# Patient Record
Sex: MALE | Race: WHITE | Employment: OTHER | URBAN - METROPOLITAN AREA
[De-identification: names, ages, dates, MRNs, and addresses within clinical notes are randomized per-mention and may not be internally consistent; named-entity substitution may affect disease eponyms.]

---

## 2017-01-03 ENCOUNTER — TELEPHONE (OUTPATIENT)
Dept: ENDOCRINOLOGY CLINIC | Facility: CLINIC | Age: 67
End: 2017-01-03

## 2017-01-03 NOTE — TELEPHONE ENCOUNTER
Pt states that his sugar was 62 on Sunday and 55 yesterday. Sugar was 129 this morning. Pt was concerned about low readings. Please advise. Aware Dr. Armen Simpson on maternity leave. Please advise.

## 2017-01-03 NOTE — TELEPHONE ENCOUNTER
Dr. Rudy Waite reviewed the message below and patient's faxed sugar readings and gave the following orders:    Reduce glimeperide to 2mg in the morning. Called patient. Informed him of Dr. Olivas Staff instructions. He verbalized his understanding.  He states destin

## 2017-01-03 NOTE — TELEPHONE ENCOUNTER
Spoke with Sohail Amato. He is concerned about recent low sugars he has been having mostly around midday. He has faxed sugars. He is currently taking 1000mg MTF BID, 15 mg actos BID, and 3mg glimeperide in the morning. Last three days sugars recorded below.  Fax

## 2017-01-09 ENCOUNTER — PATIENT MESSAGE (OUTPATIENT)
Dept: OTOLARYNGOLOGY | Facility: CLINIC | Age: 67
End: 2017-01-09

## 2017-01-09 NOTE — TELEPHONE ENCOUNTER
From: Josue Wang  To: Shayy Garber MD  Sent: 1/9/2017 9:42 AM CST  Subject: Other    Good morning Dr. Agustin Rhoades. This is Rob [facility] Hunt Syndrome).  I want to find a primary care physician in the [place] facility and transfer my overall med

## 2017-01-14 ENCOUNTER — APPOINTMENT (OUTPATIENT)
Dept: LAB | Facility: HOSPITAL | Age: 67
End: 2017-01-14
Attending: INTERNAL MEDICINE
Payer: MEDICARE

## 2017-01-14 ENCOUNTER — OFFICE VISIT (OUTPATIENT)
Dept: INTERNAL MEDICINE CLINIC | Facility: CLINIC | Age: 67
End: 2017-01-14

## 2017-01-14 VITALS
DIASTOLIC BLOOD PRESSURE: 78 MMHG | BODY MASS INDEX: 29.42 KG/M2 | SYSTOLIC BLOOD PRESSURE: 158 MMHG | WEIGHT: 198.63 LBS | HEART RATE: 70 BPM | TEMPERATURE: 97 F | RESPIRATION RATE: 18 BRPM | HEIGHT: 69 IN

## 2017-01-14 DIAGNOSIS — Z12.5 SCREENING FOR PROSTATE CANCER: ICD-10-CM

## 2017-01-14 DIAGNOSIS — E11.9 TYPE 2 DIABETES MELLITUS WITHOUT COMPLICATION, WITHOUT LONG-TERM CURRENT USE OF INSULIN (HCC): ICD-10-CM

## 2017-01-14 DIAGNOSIS — I10 ESSENTIAL HYPERTENSION: ICD-10-CM

## 2017-01-14 DIAGNOSIS — B02.21 RAMSAY HUNT SYNDROME (GENICULATE HERPES ZOSTER): ICD-10-CM

## 2017-01-14 DIAGNOSIS — Z00.00 ENCOUNTER FOR ANNUAL HEALTH EXAMINATION: Primary | ICD-10-CM

## 2017-01-14 DIAGNOSIS — E78.5 HYPERLIPIDEMIA, UNSPECIFIED HYPERLIPIDEMIA TYPE: ICD-10-CM

## 2017-01-14 LAB
CHOLEST SERPL-MCNC: 144 MG/DL (ref 110–200)
HDLC SERPL-MCNC: 33 MG/DL
LDLC SERPL CALC-MCNC: 70 MG/DL (ref 0–99)
NONHDLC SERPL-MCNC: 111 MG/DL
PSA SERPL-MCNC: 2.7 NG/ML (ref 0–4)
TRIGL SERPL-MCNC: 203 MG/DL (ref 1–149)
TSH SERPL-ACNC: 3.87 UIU/ML (ref 0.34–5.6)
VIT B12 SERPL-MCNC: 194 PG/ML (ref 181–914)

## 2017-01-14 PROCEDURE — 36415 COLL VENOUS BLD VENIPUNCTURE: CPT

## 2017-01-14 PROCEDURE — 80061 LIPID PANEL: CPT

## 2017-01-14 PROCEDURE — 82607 VITAMIN B-12: CPT

## 2017-01-14 PROCEDURE — G0439 PPPS, SUBSEQ VISIT: HCPCS | Performed by: INTERNAL MEDICINE

## 2017-01-14 PROCEDURE — 84443 ASSAY THYROID STIM HORMONE: CPT

## 2017-01-14 PROCEDURE — 82043 UR ALBUMIN QUANTITATIVE: CPT

## 2017-01-14 PROCEDURE — 82570 ASSAY OF URINE CREATININE: CPT

## 2017-01-14 RX ORDER — LISINOPRIL 10 MG/1
TABLET ORAL
Qty: 90 TABLET | Refills: 3 | Status: SHIPPED | OUTPATIENT
Start: 2017-01-14 | End: 2017-02-09 | Stop reason: DRUGHIGH

## 2017-01-14 RX ORDER — PIOGLITAZONEHYDROCHLORIDE 30 MG/1
TABLET ORAL
COMMUNITY
Start: 2017-01-09 | End: 2017-01-14

## 2017-01-14 RX ORDER — GLIMEPIRIDE 2 MG/1
TABLET ORAL
COMMUNITY
Start: 2016-12-27 | End: 2017-01-14

## 2017-01-14 RX ORDER — GLIMEPIRIDE 2 MG/1
2 TABLET ORAL
Qty: 20 TABLET | Refills: 0 | COMMUNITY
Start: 2017-01-14 | End: 2017-03-10

## 2017-01-14 RX ORDER — ATENOLOL 25 MG/1
TABLET ORAL
COMMUNITY
Start: 2016-12-30 | End: 2017-01-14

## 2017-01-14 NOTE — PATIENT INSTRUCTIONS
Rob Jones's SCREENING SCHEDULE   Tests on this list are recommended by your physician but may not be covered, or covered at this frequency, by your insurer. Please check with your insurance carrier before scheduling to verify coverage.     PREVENTATI Flex Sigmoidoscopy Screen  Covered every 5 years No results found for this or any previous visit. No flowsheet data found. Fecal Occult Blood   Covered Annually No results found for: FOB, OCCULTSTOOL No flowsheet data found.      Barium Enema-   unco previous visit. This may be covered with your pharmacy  prescription benefits     Recommended Websites for Advanced Directives    [email or web address] [email or web address]. pdf  An information packet, including necessary form from the [place]

## 2017-01-19 ENCOUNTER — TELEPHONE (OUTPATIENT)
Dept: ENDOCRINOLOGY CLINIC | Facility: CLINIC | Age: 67
End: 2017-01-19

## 2017-01-19 NOTE — TELEPHONE ENCOUNTER
Received fax from patient blood sugars for past two weeks. Patient concerned as he is still having low blood sugars in the afternoons and overnight. Maintained on Glimepiride 2mg PO daily, Actos 15mg PO BID and Metformin.  Sugars reviewed by [facility] in AM's abse

## 2017-01-30 ENCOUNTER — APPOINTMENT (OUTPATIENT)
Dept: CT IMAGING | Facility: HOSPITAL | Age: 67
End: 2017-01-30
Attending: EMERGENCY MEDICINE
Payer: MEDICARE

## 2017-01-30 ENCOUNTER — TELEPHONE (OUTPATIENT)
Dept: SURGERY | Facility: CLINIC | Age: 67
End: 2017-01-30

## 2017-01-30 ENCOUNTER — HOSPITAL ENCOUNTER (EMERGENCY)
Facility: HOSPITAL | Age: 67
Discharge: HOME | End: 2017-01-30
Attending: EMERGENCY MEDICINE
Payer: MEDICARE

## 2017-01-30 VITALS
BODY MASS INDEX: 30.01 KG/M2 | DIASTOLIC BLOOD PRESSURE: 72 MMHG | SYSTOLIC BLOOD PRESSURE: 140 MMHG | HEART RATE: 71 BPM | RESPIRATION RATE: 18 BRPM | OXYGEN SATURATION: 96 % | HEIGHT: 68 IN | TEMPERATURE: 98 F | WEIGHT: 198 LBS

## 2017-01-30 DIAGNOSIS — N20.0 KIDNEY STONE: Primary | ICD-10-CM

## 2017-01-30 LAB
ANION GAP SERPL CALC-SCNC: 12 MMOL/L (ref 0–18)
BASOPHILS # BLD: 0 K/UL (ref 0–0.2)
BASOPHILS NFR BLD: 1 %
BILIRUB UR QL: NEGATIVE
BUN SERPL-MCNC: 20 MG/DL (ref 8–20)
BUN/CREAT SERPL: 17.2 (ref 10–20)
CALCIUM SERPL-MCNC: 9.5 MG/DL (ref 8.5–10.5)
CHLORIDE SERPL-SCNC: 103 MMOL/L (ref 95–110)
CO2 SERPL-SCNC: 21 MMOL/L (ref 22–32)
COLOR UR: YELLOW
CREAT SERPL-MCNC: 1.16 MG/DL (ref 0.5–1.5)
EOSINOPHIL # BLD: 0.8 K/UL (ref 0–0.7)
EOSINOPHIL NFR BLD: 11 %
ERYTHROCYTE [DISTWIDTH] IN BLOOD BY AUTOMATED COUNT: 15.4 % (ref 11–15)
GLUCOSE SERPL-MCNC: 216 MG/DL (ref 70–99)
GLUCOSE UR-MCNC: 150 MG/DL
HCT VFR BLD AUTO: 47.6 % (ref 41–52)
HGB BLD-MCNC: 15.9 G/DL (ref 13.5–17.5)
LEUKOCYTE ESTERASE UR QL STRIP.AUTO: NEGATIVE
LYMPHOCYTES # BLD: 1.2 K/UL (ref 1–4)
LYMPHOCYTES NFR BLD: 18 %
MCH RBC QN AUTO: 31.5 PG (ref 27–32)
MCHC RBC AUTO-ENTMCNC: 33.4 G/DL (ref 32–37)
MCV RBC AUTO: 94.2 FL (ref 80–100)
MONOCYTES # BLD: 0.7 K/UL (ref 0–1)
MONOCYTES NFR BLD: 9 %
NEUTROPHILS # BLD AUTO: 4.2 K/UL (ref 1.8–7.7)
NEUTROPHILS NFR BLD: 61 %
NITRITE UR QL STRIP.AUTO: NEGATIVE
OSMOLALITY UR CALC.SUM OF ELEC: 291 MOSM/KG (ref 275–295)
PH UR: 5 [PH] (ref 5–8)
PLATELET # BLD AUTO: 172 K/UL (ref 140–400)
PMV BLD AUTO: 9 FL (ref 7.4–10.3)
POTASSIUM SERPL-SCNC: 4 MMOL/L (ref 3.3–5.1)
PROT UR-MCNC: 30 MG/DL
RBC # BLD AUTO: 5.05 M/UL (ref 4.5–5.9)
RBC #/AREA URNS AUTO: 72 /HPF
SODIUM SERPL-SCNC: 136 MMOL/L (ref 136–144)
SP GR UR STRIP: 1.02 (ref 1–1.03)
UROBILINOGEN UR STRIP-ACNC: <2
VIT C UR-MCNC: NEGATIVE MG/DL
WBC # BLD AUTO: 6.9 K/UL (ref 4–11)
WBC #/AREA URNS AUTO: 3 /HPF

## 2017-01-30 PROCEDURE — 96374 THER/PROPH/DIAG INJ IV PUSH: CPT

## 2017-01-30 PROCEDURE — 81001 URINALYSIS AUTO W/SCOPE: CPT | Performed by: EMERGENCY MEDICINE

## 2017-01-30 PROCEDURE — 96375 TX/PRO/DX INJ NEW DRUG ADDON: CPT

## 2017-01-30 PROCEDURE — 99285 EMERGENCY DEPT VISIT HI MDM: CPT

## 2017-01-30 PROCEDURE — 74176 CT ABD & PELVIS W/O CONTRAST: CPT

## 2017-01-30 PROCEDURE — 80048 BASIC METABOLIC PNL TOTAL CA: CPT | Performed by: EMERGENCY MEDICINE

## 2017-01-30 PROCEDURE — 85025 COMPLETE CBC W/AUTO DIFF WBC: CPT | Performed by: EMERGENCY MEDICINE

## 2017-01-30 RX ORDER — CEPHALEXIN 500 MG/1
500 CAPSULE ORAL 2 TIMES DAILY
Qty: 14 CAPSULE | Refills: 0 | Status: SHIPPED | OUTPATIENT
Start: 2017-01-30 | End: 2017-02-06

## 2017-01-30 RX ORDER — KETOROLAC TROMETHAMINE 30 MG/ML
15 INJECTION, SOLUTION INTRAMUSCULAR; INTRAVENOUS ONCE
Status: COMPLETED | OUTPATIENT
Start: 2017-01-30 | End: 2017-01-30

## 2017-01-30 RX ORDER — ONDANSETRON 4 MG/1
TABLET, ORALLY DISINTEGRATING ORAL
Status: DISCONTINUED
Start: 2017-01-30 | End: 2017-01-30

## 2017-01-30 RX ORDER — MORPHINE SULFATE 4 MG/ML
4 INJECTION, SOLUTION INTRAMUSCULAR; INTRAVENOUS ONCE
Status: COMPLETED | OUTPATIENT
Start: 2017-01-30 | End: 2017-01-30

## 2017-01-30 RX ORDER — HYDROCODONE BITARTRATE AND ACETAMINOPHEN 5; 325 MG/1; MG/1
1-2 TABLET ORAL EVERY 4 HOURS PRN
Qty: 10 TABLET | Refills: 0 | Status: SHIPPED | OUTPATIENT
Start: 2017-01-30 | End: 2017-02-06

## 2017-01-30 RX ORDER — ONDANSETRON 4 MG/1
4 TABLET, ORALLY DISINTEGRATING ORAL ONCE
Status: DISCONTINUED | OUTPATIENT
Start: 2017-01-30 | End: 2017-01-30

## 2017-01-30 RX ORDER — TAMSULOSIN HYDROCHLORIDE 0.4 MG/1
0.4 CAPSULE ORAL DAILY
Qty: 7 CAPSULE | Refills: 0 | Status: SHIPPED | OUTPATIENT
Start: 2017-01-30 | End: 2017-02-13

## 2017-01-30 NOTE — ED NOTES
Discharged home with plan to follow up with PCP/urology as indicated. Alert and interactive. Hemodynamically stable. Agrees with pain management plan.  WC assist  to exit

## 2017-01-30 NOTE — ED NOTES
Patient to ED with c/o left sided flank pain that awoke him at [facility]. Hx of kidney stones and feels the same. Minimal nausea. IV established to left hand, #22, labs sent. Medicated with morphine for pain. Lights dimmed for comfort, wife at bedside.  Awaiting

## 2017-01-30 NOTE — TELEPHONE ENCOUNTER
Pt's wife, requesting an appt to f/u from the ER: 1/30 and per ER pt is to f/u by tomorrow. Pt states no pain, currently taking pain meds and antibiotics. Please advise, thank you.

## 2017-01-30 NOTE — TELEPHONE ENCOUNTER
Spoke with pt and determined that he was in the ER this am and dx with a 4 mm lt stone with moderate hydro and was told to f/u with uro. Pt denies any pain at this time and was prescribed Hydrocodone, Keflex and Tamsulosin in the ER.  He was also given a st

## 2017-01-30 NOTE — ED NOTES
Care assumed. Alert and interactive states pain resolved after toradol administration.  Awaiting disposition plan

## 2017-01-30 NOTE — ED PROVIDER NOTES
Patient Seen in: [facility] AND [facility] Emergency Department    History   Patient presents with:  Abdomen/Flank Pain (GI/)    Stated Complaint: Left sided flank pain. HPI    49-year-old male presents for evaluation of left flank pain.   Patient reports s (two) times daily. simvastatin 40 MG Oral Tab,  Take 1 tablet (40 mg total) by mouth nightly.    Glucose Blood (FREESTYLE LITE TEST) In Vitro Strip,  Use test strips to check blood sugar up to 4 times per day   FREESTYLE LANCETS Does not apply Misc,  Use Mouth/Throat: Oropharynx is clear and moist.   Eyes: Conjunctivae and EOM are normal. Pupils are equal, round, and reactive to light. Neck: Normal range of motion. Neck supple.    Cardiovascular: Normal rate, regular rhythm, normal heart sounds and Guinea Bacteria Urine Few (*)     All other components within normal limits   CBC W/ DIFFERENTIAL - Abnormal; Notable for the following:     RDW 15.4 (*)     Eosinophil Absolute 0.8 (*)     All other components within normal limits   CBC WITH DIFFERENTIAL WITH Cap  Take 1 capsule (0.4 mg total) by mouth daily. , Script printed, Disp-7 capsule, R-0    HYDROcodone-acetaminophen 5-325 MG Oral Tab  Take 1-2 tablets by mouth every 4 (four) hours as needed for Pain (Do not exceed 8 tabs per day. )., Script printed, Kristina Maya

## 2017-01-31 ENCOUNTER — OFFICE VISIT (OUTPATIENT)
Dept: SURGERY | Facility: CLINIC | Age: 67
End: 2017-01-31

## 2017-01-31 VITALS
HEART RATE: 64 BPM | HEIGHT: 68 IN | BODY MASS INDEX: 30.01 KG/M2 | RESPIRATION RATE: 18 BRPM | SYSTOLIC BLOOD PRESSURE: 136 MMHG | WEIGHT: 198 LBS | TEMPERATURE: 98 F | DIASTOLIC BLOOD PRESSURE: 70 MMHG

## 2017-01-31 DIAGNOSIS — N20.1 URETERAL CALCULI: Primary | ICD-10-CM

## 2017-01-31 PROBLEM — N23 RENAL COLIC: Status: ACTIVE | Noted: 2017-01-31

## 2017-01-31 PROBLEM — N20.0 KIDNEY STONES: Status: ACTIVE | Noted: 2017-01-31

## 2017-01-31 PROCEDURE — G0463 HOSPITAL OUTPT CLINIC VISIT: HCPCS | Performed by: UROLOGY

## 2017-01-31 PROCEDURE — 99204 OFFICE O/P NEW MOD 45 MIN: CPT | Performed by: UROLOGY

## 2017-01-31 NOTE — PROGRESS NOTES
[facility] Patient Status:  Outpatient    1950 MRN [id number]   Location [facility] Attending Ramona Falk.  [address] Day #  PCP Bebe Hanson MD       UROLOGICAL CONSULTATION infections with high fever or flank pain. No history of bladder, prostate, testicular, epididymal infections. No history of trauma or injury to the urinary tract or genitalia.  No history of tumors or cancers of the kidneys, ureters, bladder, testes or p disease in the form of nephritis or glomerulonephritis. 7.   There is no history of orthopedic complaints of back, bone or joint pain. 8.   There is no history of skin disease.    9.   No history of neurological disease in the form of stroke, multiple scl (two) times daily. Disp:  Rfl:        ALLERGIES:     Tetanus Immune Glob*        Comment:Other reaction(s): Other (See Comments)             Allergy to horse hair/serum.     SOCIAL HISTORY:    Smoked alcohol intake is rare 1 or 2 drinks per month denies  tone, no rectal masses. Prostate is   1.5+ in size, firm, symmetrical, non-nodular, nontender. Seminal vesicles are palpable and normal.  Dull and sharp perineal sensation is intact. Bulbocavernosis reflex is intact.   Extremity exam shows no clubbing, would stay away from nonsteroidal anti-inflammatories aspirin Motrin in case patient would need surgery.   However he is asymptomatic at the present time and we will see patient back in 6 weeks and again IVP x-rays ordered if still necessary we could order

## 2017-02-02 ENCOUNTER — TELEPHONE (OUTPATIENT)
Dept: ENDOCRINOLOGY CLINIC | Facility: CLINIC | Age: 67
End: 2017-02-02

## 2017-02-02 NOTE — TELEPHONE ENCOUNTER
Patient sent BG for review. Maintained on Actos 15mg PO BID and Metformin 1000 mg BID.                        B      L      D      HS  1/21: 135   140   94     120  1/22: 130    125  102    144  1/23: 140    133   99     227  1/24: 152    138   82     140

## 2017-02-07 RX ORDER — LANCETS 28 GAUGE
EACH MISCELLANEOUS
Qty: 100 EACH | Refills: 1 | Status: SHIPPED | OUTPATIENT
Start: 2017-02-07 | End: 2018-02-12

## 2017-02-07 NOTE — TELEPHONE ENCOUNTER
Called patient and let him know below. He is agreeable to come in to discuss medication changes. Booked for appt 3/17. Patient also requesting refills on test strips and lancets. Per AM protocol ok to refill through upcoming appt.

## 2017-02-07 NOTE — TELEPHONE ENCOUNTER
Please let the patient know that I would like to change his  Medications. This can wait till March though. There are many options and I would like to go over these in detail. Can he see me in March ?

## 2017-02-09 ENCOUNTER — TELEPHONE (OUTPATIENT)
Dept: INTERNAL MEDICINE CLINIC | Facility: CLINIC | Age: 67
End: 2017-02-09

## 2017-02-09 ENCOUNTER — NURSE ONLY (OUTPATIENT)
Dept: INTERNAL MEDICINE CLINIC | Facility: CLINIC | Age: 67
End: 2017-02-09

## 2017-02-09 VITALS — SYSTOLIC BLOOD PRESSURE: 147 MMHG | DIASTOLIC BLOOD PRESSURE: 72 MMHG | HEART RATE: 61 BPM

## 2017-02-09 DIAGNOSIS — Z01.30 BLOOD PRESSURE CHECK: Primary | ICD-10-CM

## 2017-02-09 RX ORDER — LISINOPRIL 20 MG/1
20 TABLET ORAL DAILY
Qty: 90 TABLET | Refills: 0 | Status: SHIPPED | OUTPATIENT
Start: 2017-02-09 | End: 2017-04-20

## 2017-02-09 NOTE — TELEPHONE ENCOUNTER
Please contact the patient. Based on his blood pressure reading, I would increase the dose of lisinopril from 10 up to 20 mg a day. See me in the office in about a month or so.

## 2017-02-09 NOTE — PROGRESS NOTES
Rec'd order from Inga Dolan to do a blood pressure check on patient earlier this morning. Pt arrived for a nurse visit for a blood pressure reading. Went over blood pressure medications with patient.   He is currently taking atenolol 25 mg once a day, and lisino

## 2017-02-09 NOTE — TELEPHONE ENCOUNTER
Pt informed. Verbalized good understanding of all with intent to comply. New rx sent to pharmacy. appt made, pt agreed to location, date and time.

## 2017-02-09 NOTE — TELEPHONE ENCOUNTER
Pt arrived for a nurse visit for a blood pressure reading. Pt stts he's currently taking atenolol 25 mg once a day, and lisinopril 10 mg once a day. Blood pressure reading during visit was 147/72 with a pulse of 61.   Any further instructions needed for p

## 2017-02-21 ENCOUNTER — OFFICE VISIT (OUTPATIENT)
Dept: OTOLARYNGOLOGY | Facility: CLINIC | Age: 67
End: 2017-02-21

## 2017-02-21 VITALS
SYSTOLIC BLOOD PRESSURE: 154 MMHG | TEMPERATURE: 98 F | HEIGHT: 68 IN | WEIGHT: 198 LBS | DIASTOLIC BLOOD PRESSURE: 84 MMHG | BODY MASS INDEX: 30.01 KG/M2

## 2017-02-21 DIAGNOSIS — H91.92 HEARING LOSS OF LEFT EAR, UNSPECIFIED HEARING LOSS TYPE: Primary | ICD-10-CM

## 2017-02-21 DIAGNOSIS — G51.0 FACIAL PARALYSIS: ICD-10-CM

## 2017-02-21 PROCEDURE — G0463 HOSPITAL OUTPT CLINIC VISIT: HCPCS | Performed by: OTOLARYNGOLOGY

## 2017-02-21 PROCEDURE — 99214 OFFICE O/P EST MOD 30 MIN: CPT | Performed by: OTOLARYNGOLOGY

## 2017-02-21 RX ORDER — LATANOPROST 50 UG/ML
SOLUTION/ DROPS OPHTHALMIC
Refills: 3 | COMMUNITY
Start: 2017-02-12 | End: 2020-12-31

## 2017-02-21 NOTE — PROGRESS NOTES
Arpit Casas is a 74year old male. Patient presents with:   Follow - Up: 2 month follow up- facial paralysis- per pt he feels much better      HISTORY OF PRESENT ILLNESS     He presents with a history of ear pain and discomfort about 4 or 5 days ag Topics    Smoking Status: Never Smoker                      Smokeless Status: Never Used                        Alcohol Use: Yes                Comment: socially, once a month, if that    Drug Use: No                Family History   Problem Relation Age of of his left facial paralysis.  Very mild mid face weakness on the left   Head/Face Normal Facial features - Normal. Eyebrows - Normal. Skull - Normal.        Nasopharynx Normal External nose - Normal. Lips/teeth/gums - Normal. Tonsils - Normal. Oropharynx - TABLET BY MOUTH DAILY, Disp: , Rfl:   •  MetFORMIN HCl 500 MG Oral Tab, TAKE TWO TABLETS BY MOUTH TWICE DAILY, Disp: , Rfl:   •  Omega-3-acid Ethyl Esters 1 G Oral Cap, TAKE ONE CAPSULE BY MOUTH TWICE A DAY, Disp: , Rfl:   •  Pioglitazone HCl 30 MG Oral Ta

## 2017-02-24 ENCOUNTER — TELEPHONE (OUTPATIENT)
Dept: ENDOCRINOLOGY CLINIC | Facility: CLINIC | Age: 67
End: 2017-02-24

## 2017-02-24 NOTE — TELEPHONE ENCOUNTER
Medicare diabetic form received from [facility]. Form completed and faxed with requested documents back to pharmacy for refill of test strips.

## 2017-02-26 ENCOUNTER — PATIENT MESSAGE (OUTPATIENT)
Dept: OTOLARYNGOLOGY | Facility: CLINIC | Age: 67
End: 2017-02-26

## 2017-02-27 RX ORDER — SIMVASTATIN 40 MG
TABLET ORAL
Qty: 90 TABLET | Refills: 0 | Status: SHIPPED | OUTPATIENT
Start: 2017-02-27 | End: 2017-06-05

## 2017-02-27 NOTE — TELEPHONE ENCOUNTER
From: Naima Chu  To: Shayy Garber MD  Sent: 2/26/2017 4:39 PM CST  Subject: Non-Urgent Medical Question    Hi Dr. Agustin Rhoades. This is [facility], the [employer] Syndrome patient. Do you think it is OK to take a commercial flight?  I am plan

## 2017-03-01 ENCOUNTER — TELEPHONE (OUTPATIENT)
Dept: SURGERY | Facility: CLINIC | Age: 67
End: 2017-03-01

## 2017-03-01 ENCOUNTER — HOSPITAL ENCOUNTER (OUTPATIENT)
Dept: GENERAL RADIOLOGY | Facility: HOSPITAL | Age: 67
Discharge: HOME | End: 2017-03-01
Attending: UROLOGY
Payer: MEDICARE

## 2017-03-01 DIAGNOSIS — N20.1 URETERAL CALCULI: ICD-10-CM

## 2017-03-01 LAB — CREAT BLD-MCNC: 0.9 MG/DL (ref 0.5–1.5)

## 2017-03-01 PROCEDURE — 82565 ASSAY OF CREATININE: CPT

## 2017-03-01 PROCEDURE — 74415 UROGRAPHY NFS DRIP&/BLS W/NF: CPT

## 2017-03-01 NOTE — TELEPHONE ENCOUNTER
----- Message from Marilynn Hull MD sent at 3/1/2017 11:45 AM CST -----  Please contact patient with IVP result that proves that patient has been able to pass his ureteral calculi

## 2017-03-06 NOTE — TELEPHONE ENCOUNTER
Phoned pt and spoke with him. Read to him 's result note as outlined below in this encounter, in it's entirety. Pt verbalized understanding and is thankful.

## 2017-03-10 ENCOUNTER — OFFICE VISIT (OUTPATIENT)
Dept: INTERNAL MEDICINE CLINIC | Facility: CLINIC | Age: 67
End: 2017-03-10

## 2017-03-10 VITALS
DIASTOLIC BLOOD PRESSURE: 66 MMHG | SYSTOLIC BLOOD PRESSURE: 134 MMHG | TEMPERATURE: 98 F | WEIGHT: 192.69 LBS | BODY MASS INDEX: 29.2 KG/M2 | HEART RATE: 60 BPM | RESPIRATION RATE: 20 BRPM | HEIGHT: 68 IN

## 2017-03-10 DIAGNOSIS — I10 ESSENTIAL HYPERTENSION: Primary | ICD-10-CM

## 2017-03-10 PROCEDURE — 99213 OFFICE O/P EST LOW 20 MIN: CPT | Performed by: INTERNAL MEDICINE

## 2017-03-10 PROCEDURE — G0463 HOSPITAL OUTPT CLINIC VISIT: HCPCS | Performed by: INTERNAL MEDICINE

## 2017-03-10 NOTE — PROGRESS NOTES
HPI:    Patient ID: Isabella Jacome is a 74year old male.     HPI  Patient returns to the office today to discuss chronic medical issues which include Patient Active Problem List:     Facial droop     Sensorineural hearing loss, bilateral     T2DM (typ of stroke   • Other[other] [OTHER] Mother      Alzheimer's Disease   • Diabetes Sister         Smoking Status: Never Smoker                      Smokeless Status: Never Used                        Alcohol Use: Yes                Comment: socially, once a m sprays by Nasal route as needed.  ) Disp: 1 Bottle Rfl: 0   atenolol 25 MG Oral Tab TAKE 1 TABLET BY MOUTH EVERY DAY Disp:  Rfl:    Fenofibrate 145 MG Oral Tab TAKE 1/2 TABLET BY MOUTH DAILY Disp:  Rfl:    MetFORMIN HCl 500 MG Oral Tab TAKE TWO TABLETS BY Referrals:  None         ID#[id number]

## 2017-03-17 ENCOUNTER — OFFICE VISIT (OUTPATIENT)
Dept: ENDOCRINOLOGY CLINIC | Facility: CLINIC | Age: 67
End: 2017-03-17

## 2017-03-17 VITALS
BODY MASS INDEX: 29.2 KG/M2 | SYSTOLIC BLOOD PRESSURE: 165 MMHG | HEART RATE: 71 BPM | WEIGHT: 192.63 LBS | HEIGHT: 68 IN | DIASTOLIC BLOOD PRESSURE: 71 MMHG

## 2017-03-17 DIAGNOSIS — E11.69 DYSLIPIDEMIA ASSOCIATED WITH TYPE 2 DIABETES MELLITUS (HCC): ICD-10-CM

## 2017-03-17 DIAGNOSIS — E78.5 DYSLIPIDEMIA ASSOCIATED WITH TYPE 2 DIABETES MELLITUS (HCC): ICD-10-CM

## 2017-03-17 DIAGNOSIS — E11.9 CONTROLLED TYPE 2 DIABETES MELLITUS WITHOUT COMPLICATION, WITHOUT LONG-TERM CURRENT USE OF INSULIN (HCC): Primary | ICD-10-CM

## 2017-03-17 LAB
CARTRIDGE LOT#: ABNORMAL NUMERIC
GLUCOSE BLOOD: 105
HEMOGLOBIN A1C: 6.5 % (ref 4.3–5.6)
TEST STRIP LOT #: NORMAL NUMERIC

## 2017-03-17 PROCEDURE — 99214 OFFICE O/P EST MOD 30 MIN: CPT | Performed by: INTERNAL MEDICINE

## 2017-03-17 PROCEDURE — 82962 GLUCOSE BLOOD TEST: CPT | Performed by: INTERNAL MEDICINE

## 2017-03-17 PROCEDURE — 83036 HEMOGLOBIN GLYCOSYLATED A1C: CPT | Performed by: INTERNAL MEDICINE

## 2017-03-17 PROCEDURE — 36416 COLLJ CAPILLARY BLOOD SPEC: CPT | Performed by: INTERNAL MEDICINE

## 2017-03-17 RX ORDER — PIOGLITAZONEHYDROCHLORIDE 30 MG/1
15 TABLET ORAL
COMMUNITY
End: 2017-07-03

## 2017-03-17 NOTE — PROGRESS NOTES
Return Office Visit     CHIEF COMPLAINT:    DM    Dyslipidemia    HISTORY OF PRESENT ILLNESS:  Naima Chu is a 74year old male who presents for follow up for for DM.      DM HISTORY:  Diagnosed in 2000       HISTORY OF DIABETES COMPLICATIONS: :  H Nasal route as needed.  ) Disp: 1 Bottle Rfl: 0   atenolol 25 MG Oral Tab TAKE 1 TABLET BY MOUTH EVERY DAY Disp:  Rfl:    Fenofibrate 145 MG Oral Tab TAKE 1/2 TABLET BY MOUTH DAILY Disp:  Rfl:    MetFORMIN HCl 500 MG Oral Tab TAKE TWO TABLETS BY MOUTH TWIC enlarged and no tenderness  HEMATOLOGIC/LYMPHATICS:  no cervical lymphadenopathy and no supraclavicular lymphadenopathy  LUNGS: clear to auscultation bilaterally, no crackles or wheezing  CARDIOVASCULAR:  regular rate and rhythm, normal S1 and S2  ABDOMEN: glucose [35039]  POC Glycohemoglobin [89875]      3/17/2017  Bryn Glaser MD

## 2017-03-22 ENCOUNTER — OFFICE VISIT (OUTPATIENT)
Dept: SURGERY | Facility: CLINIC | Age: 67
End: 2017-03-22

## 2017-03-22 VITALS
RESPIRATION RATE: 16 BRPM | HEIGHT: 68 IN | SYSTOLIC BLOOD PRESSURE: 130 MMHG | HEART RATE: 65 BPM | WEIGHT: 193 LBS | BODY MASS INDEX: 29.25 KG/M2 | DIASTOLIC BLOOD PRESSURE: 64 MMHG

## 2017-03-22 DIAGNOSIS — N23 RENAL COLIC: Primary | ICD-10-CM

## 2017-03-22 PROCEDURE — G0463 HOSPITAL OUTPT CLINIC VISIT: HCPCS | Performed by: UROLOGY

## 2017-03-22 PROCEDURE — 99214 OFFICE O/P EST MOD 30 MIN: CPT | Performed by: UROLOGY

## 2017-03-22 NOTE — PROGRESS NOTES
[address] Patient Status:  Outpatient    1950 MRN [id number]   Location [address] [place] [address] Attending Estela Cook.  [address] Day #  PCP MD Katey Vizcarra i 1 capsule (40 mg total) by mouth daily. Before a meal (Patient taking differently: Take 40 mg by mouth as needed. Before a meal ) Disp: 30 capsule Rfl: 2   Azelastine HCl 0.1 % Nasal Solution 2 sprays by Nasal route 2 (two) times daily.  (Patient taking dif MG Oral Tab TAKE 1/2 TABLET BY MOUTH DAILY Disp:  Rfl:    MetFORMIN HCl 500 MG Oral Tab TAKE TWO TABLETS BY MOUTH TWICE DAILY Disp:  Rfl:    Omega-3-acid Ethyl Esters 1 G Oral Cap TAKE ONE CAPSULE BY MOUTH TWICE A DAY Disp:  Rfl:          Tetanus Immune Gl and remains mostly happy and on bothered by urination I answered all the patient and wife's questions which were several spent 30 minutes with patient and wife in well over half time in face-to-face discussion of further evaluation and therapy          Darshan

## 2017-04-04 RX ORDER — ATENOLOL 25 MG/1
TABLET ORAL
Qty: 90 TABLET | Refills: 1 | Status: SHIPPED | OUTPATIENT
Start: 2017-04-04 | End: 2017-10-19

## 2017-04-21 RX ORDER — LISINOPRIL 20 MG/1
TABLET ORAL
Qty: 90 TABLET | Refills: 1 | Status: SHIPPED | OUTPATIENT
Start: 2017-04-21 | End: 2017-11-25

## 2017-06-05 RX ORDER — SIMVASTATIN 40 MG
TABLET ORAL
Qty: 90 TABLET | Refills: 0 | Status: SHIPPED | OUTPATIENT
Start: 2017-06-05 | End: 2017-09-04

## 2017-06-09 NOTE — PROGRESS NOTES
HPI:   Josue Wang is a 74year old male who presents for a Medicare Subsequent Annual Wellness visit (Pt already had Initial Annual Wellness). Patient is here to establish care with new physician.   Previously taking care of by doctors up at [facility] Past Medical History:   Diagnosis Date    Arthritis     back L4- L5, knee, rt foot with fractures    Cancer     basal cell       Past Surgical History:   Procedure Laterality Date    CARPAL TUNNEL RELEASE      CARPAL TUNNEL RELEASE      EXTENSOR TENDON OF FOREARM / WRIST REPAIR Right 04/27/2017    qubital tunnel      SHOULDER ARTHROSCOPY W/ ROTATOR CUFF REPAIR      TARSAL NAVICULAR ARTHODESIS      ULNAR TUNNEL RELEASE      VASECTOMY         Current Outpatient Prescriptions   Medication Sig    NEXIUM 40 mg capsule Take 1 tablet by mouth once daily.    trazodone (DESYREL) 50 MG tablet Take 1 tablet by mouth nightly.     No current facility-administered medications for this visit.        Review of patient's allergies indicates:  No Known Allergies    Family History   Problem Relation Age of Onset    Early death Mother 30     MVA    Asthma Maternal Grandmother     Cancer Maternal Grandfather      colon, lung    Heart disease Paternal Grandfather        Social History     Social History    Marital status:      Spouse name: N/A    Number of children: N/A    Years of education: N/A     Occupational History    Not on file.     Social History Main Topics    Smoking status: Never Smoker    Smokeless tobacco: Never Used    Alcohol use Yes      Comment: daily beer    Drug use: No    Sexual activity: Yes     Partners: Female     Other Topics Concern    Not on file     Social History Narrative    No narrative on file       Chief Complaint:   Chief Complaint   Patient presents with    Post-op Evaluation     DOS: 04/27/2017, 6 weeks 1 day; Extensor Tendon repair right elbow.   Patient is in a short arm cast.  Elbow is tender to touch and sore but no significant pain to report.         Consulting Physician: No ref. provider found    History of Present Illness:    This is a 47 y.o. year old male who complains of the patient is status post partial lateral picondylectomy of the right elbow  His cast  "was removed this morning on his right elbow is healing well his pain level is 1 out of 10      ROS    Examination:    Vital Signs:    Vitals:    06/09/17 0839   BP: (!) 148/80   Pulse: 95   Weight: 83.5 kg (184 lb)   Height: 5' 6" (1.676 m)       This a well-developed, well nourished patient in no acute distress.    Alert and oriented and cooperative to examination.       Physical Exam: Right Elbow Exam    Skin  Scars:   None  Rash:   None    Inspection  Erythema:  None  Bruising:  None  Swelling:  None  Masses:  None  Lymphadenopathy: None    Range of Motion  Flexion:  160°  Extension:  0°  Supination:  80°  Pronation:  80°    Tenderness  Medial Epicondyle: None  Lateral Epicondyle: None  Olecranon:  None    Stability  Valgus Stress:  Stable  Varus Stress:  Stable    Strength:  Normal  Sensation:  Intact  Cubital Tinel's:     Negative    Vascular  Pulses:  Palpable  Capillary Refill: Normal     p atient's incision is healing well patient has full range of motion of the right elbow      Imaging: X-rays ordered and reviewed today no x-rays done     Assessment: status post partial lateral epicondylectomy right elbow    Plan:  Patient's start range of motion and no heavy lifting for at least 6 weeks      DISCLAIMER: This note may have been dictated using voice recognition software and may contain grammatical errors.     NOTE: Consult report sent to referring provider via EPIC EMR.  " WBC 6.9 11/28/2016   HGB 17.4 11/28/2016    11/28/2016        ALLERGIES:   He is allergic to tetanus immune globulin.     CURRENT MEDICATIONS:     Outpatient Prescriptions Marked as Taking for the 1/14/17 encounter (Office Visit) with Gus Watts denies blurred vision or double vision  HEENT: denies nasal congestion, sinus pain or ST  LUNGS: denies shortness of breath with exertion  CARDIOVASCULAR: denies chest pain on exertion  GI: denies abdominal pain, denies heartburn  : 0 per night nocturia, S2 normal, no murmur, rub or gallop   Abdomen:   Soft, non-tender, bowel sounds active all four quadrants,  no masses, no organomegaly   Genitalia: Normal male   Rectal: Normal tone, normal prostate, no masses or tenderness   Extremities: Extremities erwin Essential hypertension  Plan: LIPID PANEL, VITAMIN B12, ASSAY, THYROID STIM         HORMONE        Blood pressure well controlled. Continue current medication.    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   Still wi day to day activities?: 0-No     Have you had any memory issues?: 0-No    Fall/Risk Scorin          Depression Screening (PHQ-2/PHQ-9): Over the LAST 2 WEEKS   Little interest or pleasure in doing things (over the last two weeks)?: Not at all    [facility] applicable   Immunizations      Influenza No orders found for this or any previous visit. Update Immunization Activity if applicable    Pneumoccocal 13 (Prevnar) No orders found for this or any previous visit.       Pneumococcal 23 (Pneumovax) No orders fou

## 2017-07-10 RX ORDER — PIOGLITAZONEHYDROCHLORIDE 30 MG/1
TABLET ORAL
Qty: 90 TABLET | Refills: 0 | Status: SHIPPED | OUTPATIENT
Start: 2017-07-10 | End: 2017-10-03

## 2017-07-25 RX ORDER — FENOFIBRATE 145 MG/1
TABLET, COATED ORAL
Qty: 15 TABLET | Refills: 0 | Status: SHIPPED | OUTPATIENT
Start: 2017-07-25 | End: 2017-08-29

## 2017-07-25 NOTE — TELEPHONE ENCOUNTER
Refilled per protocol    Diabetes Medications  Protocol Criteria:  · Appointment scheduled in the past 6 months or the next 3 months  · A1C < 7.5 in the past 6 months  · Creatinine in the past 12 months  · Creatinine result < 1.5   Recent Outpatient Visits type 2 diabetes mellitus without complication, without long-term current use of insulin [facility])    Lilly Collado, Alvin Landaverde MD    Office Visit    4 months ago Essential hypertension    [place]

## 2017-08-15 ENCOUNTER — PATIENT OUTREACH (OUTPATIENT)
Dept: INTERNAL MEDICINE CLINIC | Facility: CLINIC | Age: 67
End: 2017-08-15

## 2017-08-15 NOTE — PROGRESS NOTES
Patient meets criteria for CCM program.  Please review and if you agree, refer to Chronic Care Management. Please let patient know someone will be contacting them.

## 2017-08-17 ENCOUNTER — OFFICE VISIT (OUTPATIENT)
Dept: INTERNAL MEDICINE CLINIC | Facility: CLINIC | Age: 67
End: 2017-08-17

## 2017-08-17 VITALS
WEIGHT: 193.13 LBS | HEART RATE: 62 BPM | SYSTOLIC BLOOD PRESSURE: 142 MMHG | TEMPERATURE: 98 F | DIASTOLIC BLOOD PRESSURE: 72 MMHG | RESPIRATION RATE: 20 BRPM | HEIGHT: 68 IN | BODY MASS INDEX: 29.27 KG/M2

## 2017-08-17 DIAGNOSIS — I10 ESSENTIAL HYPERTENSION: Primary | ICD-10-CM

## 2017-08-17 DIAGNOSIS — Z87.442 HISTORY OF KIDNEY STONES: ICD-10-CM

## 2017-08-17 DIAGNOSIS — B02.21 RAMSAY HUNT SYNDROME (GENICULATE HERPES ZOSTER): ICD-10-CM

## 2017-08-17 DIAGNOSIS — E78.5 HYPERLIPIDEMIA, UNSPECIFIED HYPERLIPIDEMIA TYPE: ICD-10-CM

## 2017-08-17 DIAGNOSIS — M25.512 ACUTE PAIN OF LEFT SHOULDER: ICD-10-CM

## 2017-08-17 DIAGNOSIS — E11.9 TYPE 2 DIABETES MELLITUS WITHOUT COMPLICATION, WITHOUT LONG-TERM CURRENT USE OF INSULIN (HCC): ICD-10-CM

## 2017-08-17 PROBLEM — N23 RENAL COLIC: Status: RESOLVED | Noted: 2017-01-31 | Resolved: 2017-08-17

## 2017-08-17 PROBLEM — N20.0 KIDNEY STONES: Status: RESOLVED | Noted: 2017-01-31 | Resolved: 2017-08-17

## 2017-08-17 PROCEDURE — 99214 OFFICE O/P EST MOD 30 MIN: CPT | Performed by: INTERNAL MEDICINE

## 2017-08-17 PROCEDURE — G0463 HOSPITAL OUTPT CLINIC VISIT: HCPCS | Performed by: INTERNAL MEDICINE

## 2017-08-17 RX ORDER — ALBUTEROL SULFATE 90 UG/1
AEROSOL, METERED RESPIRATORY (INHALATION)
COMMUNITY
Start: 2014-05-27 | End: 2017-08-17 | Stop reason: ALTCHOICE

## 2017-08-17 RX ORDER — ATENOLOL 25 MG/1
TABLET ORAL
COMMUNITY
Start: 2016-12-30 | End: 2017-08-17

## 2017-08-17 RX ORDER — OMEGA-3-ACID ETHYL ESTERS 1 G/1
CAPSULE, LIQUID FILLED ORAL
COMMUNITY
Start: 2017-07-19 | End: 2017-08-17

## 2017-08-17 RX ORDER — GLIMEPIRIDE 2 MG/1
TABLET ORAL
COMMUNITY
Start: 2016-12-27 | End: 2017-08-17 | Stop reason: ALTCHOICE

## 2017-08-17 RX ORDER — LISINOPRIL 10 MG/1
TABLET ORAL
COMMUNITY
Start: 2016-10-14 | End: 2017-08-17

## 2017-08-17 RX ORDER — LATANOPROST 50 UG/ML
SOLUTION/ DROPS OPHTHALMIC
COMMUNITY
Start: 2011-06-17 | End: 2017-08-17

## 2017-08-17 RX ORDER — HYDROCODONE BITARTRATE AND ACETAMINOPHEN 5; 325 MG/1; MG/1
1-2 TABLET ORAL
COMMUNITY
Start: 2016-11-26 | End: 2017-08-17 | Stop reason: ALTCHOICE

## 2017-08-17 RX ORDER — SIMVASTATIN 10 MG
TABLET ORAL
COMMUNITY
Start: 2017-01-23 | End: 2017-08-17

## 2017-08-17 RX ORDER — PIOGLITAZONEHYDROCHLORIDE 30 MG/1
TABLET ORAL
COMMUNITY
Start: 2017-01-09 | End: 2017-08-17

## 2017-08-17 RX ORDER — FENOFIBRATE 145 MG/1
TABLET, COATED ORAL
COMMUNITY
Start: 2017-06-17 | End: 2017-08-17

## 2017-08-17 RX ORDER — AZELASTINE HCL 205.5 UG/1
SPRAY NASAL
COMMUNITY
Start: 2016-11-10 | End: 2017-08-17

## 2017-08-17 NOTE — PROGRESS NOTES
HPI:    Patient ID: Debbie Nova is a 74year old male. HPI  Patient here for follow-up on chronic medical issues as listed below. Last seen here in March. Since that time, he saw the endocrinology doctor and the urology doctor.   He is off of t Respiratory: Negative for cough and shortness of breath. Cardiovascular: Negative for chest pain and palpitations. Gastrointestinal: Positive for diarrhea. Negative for nausea, vomiting, abdominal pain and constipation.    Genitourinary: Negative for hair/serum. PHYSICAL EXAM:   Physical Exam    Constitutional: He appears well-developed and well-nourished. HENT:   Nose: Nose normal.   Mouth/Throat: Oropharynx is clear and moist.   Eyes: Pupils are equal, round, and reactive to light.    Lizbeth Escobar shot.      Meds This Visit:  No prescriptions requested or ordered in this encounter       Imaging & Referrals:  None         #[id number]

## 2017-08-21 ENCOUNTER — TELEPHONE (OUTPATIENT)
Dept: ENDOCRINOLOGY CLINIC | Facility: CLINIC | Age: 67
End: 2017-08-21

## 2017-08-21 RX ORDER — BLOOD-GLUCOSE METER
KIT MISCELLANEOUS
Qty: 100 STRIP | Refills: 3 | Status: SHIPPED | OUTPATIENT
Start: 2017-08-21 | End: 2017-08-21

## 2017-08-21 NOTE — TELEPHONE ENCOUNTER
Added DX code in script sig. Called pharm and confirmed ok to resend script with DX code in script sign.

## 2017-08-21 NOTE — TELEPHONE ENCOUNTER
Pharmacy called to request new RX. RX needs to have diagnosis code on it in order for Medicare to pay. Please fax.       Current Outpatient Prescriptions:   •  FREESTYLE LITE TEST In Vitro Strip, USE TEST STRIPS TO CHECK BLOOD SUGAR UP TO 4 TIMES PER DAY,

## 2017-09-01 RX ORDER — FENOFIBRATE 145 MG/1
TABLET, COATED ORAL
Qty: 15 TABLET | Refills: 2 | Status: SHIPPED | OUTPATIENT
Start: 2017-09-01 | End: 2017-12-03

## 2017-09-01 NOTE — TELEPHONE ENCOUNTER
Refilled per protocol.    Cholesterol Medications  Protocol Criteria:  · Appointment scheduled in the past 12 months or in the next 3 months  · ALT & LDL on file in the past 12 months  · ALT result < 80  · LDL result <130   Recent Outpatient Visits complication, without long-term current use of insulin [facility])    [place], [address] [facility], [facility] Mohamud Jacobson MD    Office Visit    5 months ago Essential hypertension    [address] [address] [facility]

## 2017-09-05 RX ORDER — SIMVASTATIN 40 MG
TABLET ORAL
Qty: 90 TABLET | Refills: 0 | Status: SHIPPED | OUTPATIENT
Start: 2017-09-05 | End: 2017-12-02

## 2017-09-08 ENCOUNTER — OFFICE VISIT (OUTPATIENT)
Dept: ENDOCRINOLOGY CLINIC | Facility: CLINIC | Age: 67
End: 2017-09-08

## 2017-09-08 VITALS
SYSTOLIC BLOOD PRESSURE: 176 MMHG | DIASTOLIC BLOOD PRESSURE: 81 MMHG | HEIGHT: 67.5 IN | WEIGHT: 194 LBS | HEART RATE: 63 BPM | BODY MASS INDEX: 30.09 KG/M2

## 2017-09-08 DIAGNOSIS — E11.69 DYSLIPIDEMIA ASSOCIATED WITH TYPE 2 DIABETES MELLITUS (HCC): ICD-10-CM

## 2017-09-08 DIAGNOSIS — E11.649 CONTROLLED TYPE 2 DIABETES MELLITUS WITH HYPOGLYCEMIA, WITHOUT LONG-TERM CURRENT USE OF INSULIN (HCC): Primary | ICD-10-CM

## 2017-09-08 DIAGNOSIS — E78.5 DYSLIPIDEMIA ASSOCIATED WITH TYPE 2 DIABETES MELLITUS (HCC): ICD-10-CM

## 2017-09-08 LAB
CARTRIDGE LOT#: ABNORMAL NUMERIC
GLUCOSE BLOOD: 107
HEMOGLOBIN A1C: 6.4 % (ref 4.3–5.6)
TEST STRIP LOT #: NORMAL NUMERIC

## 2017-09-08 PROCEDURE — 36416 COLLJ CAPILLARY BLOOD SPEC: CPT | Performed by: INTERNAL MEDICINE

## 2017-09-08 PROCEDURE — 83036 HEMOGLOBIN GLYCOSYLATED A1C: CPT | Performed by: INTERNAL MEDICINE

## 2017-09-08 PROCEDURE — 99213 OFFICE O/P EST LOW 20 MIN: CPT | Performed by: INTERNAL MEDICINE

## 2017-09-08 PROCEDURE — 82962 GLUCOSE BLOOD TEST: CPT | Performed by: INTERNAL MEDICINE

## 2017-09-08 NOTE — PROGRESS NOTES
Return Office Visit     CHIEF COMPLAINT:    DM    Dyslipidemia    HISTORY OF PRESENT ILLNESS:  Rodger Meza is a 74year old male who presents for follow up for for DM.      DM HISTORY:  Diagnosed in 2000       HISTORY OF DIABETES COMPLICATIONS: :  H Rfl:    Omeprazole 40 MG Oral Capsule Delayed Release Take 1 capsule (40 mg total) by mouth daily. Before a meal (Patient taking differently: Take 40 mg by mouth as needed.  Before a meal ) Disp: 30 capsule Rfl: 2         ALLERGY:    Tetanus Immune Glob* crackles or wheezing  CARDIOVASCULAR:  regular rate and rhythm, normal S1 and S2  ABDOMEN:  normal bowel sounds, soft, non-distended, non-tender  SKIN:  no bruising or bleeding, no rashes and no lesions  EXTREMITIES: normal pulses, no edema      DATA: Encounter      POC Finger stick glucose [50207]      POC Glycohemoglobin [29420]      Lipid Panel [E]      Microalb/Creat Ratio, Random Urine [E]      Comp Metabolic Panel [E]

## 2017-10-04 RX ORDER — PIOGLITAZONEHYDROCHLORIDE 30 MG/1
TABLET ORAL
Qty: 90 TABLET | Refills: 0 | Status: SHIPPED | OUTPATIENT
Start: 2017-10-04 | End: 2018-01-03

## 2017-10-04 NOTE — TELEPHONE ENCOUNTER
Diabetes Medications  Protocol Criteria:  · Appointment scheduled in the past 6 months or the next 3 months  · A1C < 7.5 in the past 6 months  · Creatinine in the past 12 months  · Creatinine result < 1.5   Recent Outpatient Visits            3 weeks ago C

## 2017-10-18 ENCOUNTER — TELEPHONE (OUTPATIENT)
Dept: INTERNAL MEDICINE CLINIC | Facility: CLINIC | Age: 67
End: 2017-10-18

## 2017-10-18 NOTE — TELEPHONE ENCOUNTER
Pt requesting refill for medication below and states almost out of medication.      Current Outpatient Prescriptions:   •  ATENOLOL 25 MG Oral Tab, TAKE 1 TABLET BY MOUTH EVERY DAY, Disp: 90 tablet, Rfl: 1

## 2017-10-19 RX ORDER — ATENOLOL 25 MG/1
25 TABLET ORAL
Qty: 90 TABLET | Refills: 0 | Status: SHIPPED | OUTPATIENT
Start: 2017-10-19 | End: 2018-01-15

## 2017-10-19 NOTE — TELEPHONE ENCOUNTER
Refilled per protocol      Hypertensive Medications  Protocol Criteria:  · Appointment scheduled in the past 6 months or in the next 3 months  · BMP or CMP in the past 12 months  · Creatinine result < 2  Recent Outpatient Visits            1 month ago Cont

## 2017-10-20 ENCOUNTER — TELEPHONE (OUTPATIENT)
Dept: INTERNAL MEDICINE CLINIC | Facility: CLINIC | Age: 67
End: 2017-10-20

## 2017-10-21 RX ORDER — OMEGA-3-ACID ETHYL ESTERS 1 G/1
CAPSULE, LIQUID FILLED ORAL
Qty: 180 CAPSULE | Refills: 0 | Status: SHIPPED | OUTPATIENT
Start: 2017-10-21 | End: 2018-01-16

## 2017-10-23 ENCOUNTER — TELEPHONE (OUTPATIENT)
Dept: OTHER | Age: 67
End: 2017-10-23

## 2017-10-23 NOTE — TELEPHONE ENCOUNTER
Patient states is scheduled to have flu and PNA vaccination tomorrow but over the weekend states came down with a slight cold.      Asking if because he has a cold it is still okay to get the flu shot and PNA shot    Please advise

## 2017-10-26 NOTE — TELEPHONE ENCOUNTER
Pt advised of Dr. Francis Daniel recommendations. He will call back to schedule as he is going out of town.

## 2017-11-06 ENCOUNTER — OFFICE VISIT (OUTPATIENT)
Dept: ORTHOPEDICS CLINIC | Facility: CLINIC | Age: 67
End: 2017-11-06

## 2017-11-06 ENCOUNTER — HOSPITAL ENCOUNTER (OUTPATIENT)
Dept: GENERAL RADIOLOGY | Facility: HOSPITAL | Age: 67
Discharge: HOME | End: 2017-11-06
Attending: ORTHOPAEDIC SURGERY | Admitting: ORTHOPAEDIC SURGERY
Payer: MEDICARE

## 2017-11-06 DIAGNOSIS — M25.512 ACUTE PAIN OF LEFT SHOULDER: ICD-10-CM

## 2017-11-06 DIAGNOSIS — M75.102 ROTATOR CUFF SYNDROME OF LEFT SHOULDER: ICD-10-CM

## 2017-11-06 DIAGNOSIS — R52 PAIN: Primary | ICD-10-CM

## 2017-11-06 DIAGNOSIS — R52 PAIN: ICD-10-CM

## 2017-11-06 PROCEDURE — G0463 HOSPITAL OUTPT CLINIC VISIT: HCPCS | Performed by: ORTHOPAEDIC SURGERY

## 2017-11-06 PROCEDURE — 99203 OFFICE O/P NEW LOW 30 MIN: CPT | Performed by: ORTHOPAEDIC SURGERY

## 2017-11-06 PROCEDURE — 73030 X-RAY EXAM OF SHOULDER: CPT | Performed by: ORTHOPAEDIC SURGERY

## 2017-11-06 RX ORDER — MELOXICAM 15 MG/1
15 TABLET ORAL DAILY
Qty: 30 TABLET | Refills: 1 | Status: SHIPPED | OUTPATIENT
Start: 2017-11-06 | End: 2018-11-02

## 2017-11-06 NOTE — H&P
Chief Complaint: left shoulder pain    NURSING INTAKE COMMENTS: Patient presents with:  Consult: Pt is here for pain in the left shoulder and upper arm. Pt is a . Pain statted twomonths ago. Pain starated on its own.  Denies any numbness or tingl Subscapularis 5/5 5/5      Deltoid 5/5 5/5      Biceps 5/5 5/5        Pain         Rotator Cuff resistance ++ none       Bicipital Groove none none       AC joint none none        Neurovascular status Intact Intact        Neck ROM WNL WNL   Myelopathic sig

## 2017-11-15 ENCOUNTER — IMMUNIZATION (OUTPATIENT)
Dept: INTERNAL MEDICINE CLINIC | Facility: CLINIC | Age: 67
End: 2017-11-15

## 2017-11-15 PROCEDURE — G0008 ADMIN INFLUENZA VIRUS VAC: HCPCS | Performed by: INTERNAL MEDICINE

## 2017-11-15 PROCEDURE — 90653 IIV ADJUVANT VACCINE IM: CPT | Performed by: INTERNAL MEDICINE

## 2017-11-28 RX ORDER — LISINOPRIL 20 MG/1
TABLET ORAL
Qty: 90 TABLET | Refills: 0 | Status: SHIPPED | OUTPATIENT
Start: 2017-11-28 | End: 2018-02-05

## 2017-11-28 NOTE — TELEPHONE ENCOUNTER
Hypertensive Medications  Protocol Criteria:  · Appointment scheduled in the past 6 months or in the next 3 months  · BMP or CMP in the past 12 months  · Creatinine result < 2  Recent Outpatient Visits            3 weeks ago [place]    [facility]

## 2017-12-04 RX ORDER — SIMVASTATIN 40 MG
TABLET ORAL
Qty: 90 TABLET | Refills: 0 | Status: SHIPPED | OUTPATIENT
Start: 2017-12-04 | End: 2018-03-12

## 2017-12-04 RX ORDER — FENOFIBRATE 145 MG/1
TABLET, COATED ORAL
Qty: 15 TABLET | Refills: 2 | Status: SHIPPED | OUTPATIENT
Start: 2017-12-04 | End: 2018-03-02

## 2017-12-04 NOTE — TELEPHONE ENCOUNTER
Cholesterol Medications  Protocol Criteria:  · Appointment scheduled in the past 12 months or in the next 3 months  · ALT & LDL on file in the past 12 months  · ALT result < 80  · LDL result <130   Recent Outpatient Visits            4 weeks ago Pain    [facility]

## 2018-01-04 ENCOUNTER — OFFICE VISIT (OUTPATIENT)
Dept: PHYSICAL THERAPY | Age: 68
End: 2018-01-04
Attending: ORTHOPAEDIC SURGERY
Payer: MEDICARE

## 2018-01-04 DIAGNOSIS — M75.102 ROTATOR CUFF SYNDROME OF LEFT SHOULDER: ICD-10-CM

## 2018-01-04 PROCEDURE — 97162 PT EVAL MOD COMPLEX 30 MIN: CPT | Performed by: PHYSICAL THERAPIST

## 2018-01-04 PROCEDURE — 97112 NEUROMUSCULAR REEDUCATION: CPT | Performed by: PHYSICAL THERAPIST

## 2018-01-04 PROCEDURE — 97530 THERAPEUTIC ACTIVITIES: CPT | Performed by: PHYSICAL THERAPIST

## 2018-01-04 NOTE — PROGRESS NOTES
CERVICAL SPINE/UPPER EXTREMITY EVALUATION:   Referring Physician:  Vashti Mayfield MD    Diagnosis: Rotator cuff syndrome of left shoulder (M75.102) Date of Service: 1/4/2018   Date of Onset: September 2017       SUBJECTIVE:   PATIENT SUMMARY:  Trish Packer Night Pain, Unexplained Weight Loss, Fever or Chills N   Lower Extremity Neurological Deficit N   Vision Changes/Double Vision Y; Floaters   Headaches N   Chest Pain or Palpitations, SOB N   Dizziness, weakness, numbness and tingling [place] Hunt Syndrome 4     L: 4   * pain with IR testing at L    Palpation: Tenderness to palpation anteriorly at GHJ, biceps tendon, AC joint.     Shoulder Special Tests:  Painful Arc + at L  Neer's  + at L  Saenz/Juan Manuel + at L      Intervention 1/4/2018, Visit: 1   HEP Pos Neuromuscular Re-education, Self-Care Home Management, Therapeutic Activities, Therapeutic Exercise and Home Exercise Program instruction.   Certification From: 7/8/5519     To:  4/4/2018      This plan was discussed and agreed upon by: patient   Patient wa

## 2018-01-05 RX ORDER — PIOGLITAZONEHYDROCHLORIDE 30 MG/1
TABLET ORAL
Qty: 90 TABLET | Refills: 0 | Status: SHIPPED | OUTPATIENT
Start: 2018-01-05 | End: 2018-04-27

## 2018-01-15 ENCOUNTER — OFFICE VISIT (OUTPATIENT)
Dept: PHYSICAL THERAPY | Age: 68
End: 2018-01-15
Attending: ORTHOPAEDIC SURGERY
Payer: MEDICARE

## 2018-01-15 DIAGNOSIS — M75.102 ROTATOR CUFF SYNDROME OF LEFT SHOULDER: ICD-10-CM

## 2018-01-15 PROCEDURE — 97140 MANUAL THERAPY 1/> REGIONS: CPT | Performed by: PHYSICAL THERAPIST

## 2018-01-15 PROCEDURE — 97112 NEUROMUSCULAR REEDUCATION: CPT | Performed by: PHYSICAL THERAPIST

## 2018-01-15 NOTE — PROGRESS NOTES
Name: Tacos Flores  Date: 1/15/2018  Dx: Rotator cuff syndrome of left shoulder (M75.102)        Authorized # of Visits:  2/12         Next MD visit: none scheduled  Fall Risk: standard         Precautions: n/a           Medication Changes since las ADLs. Currently 35% impaired. Reviewed goals with patient on 1/15/2018. Patient is in agreement with plan of care. Plan: Restore mechanics at shoulder girdle and facilitate retraining to improve scapular stabilization and glenohumeral mechanics.     Radha

## 2018-01-17 RX ORDER — OMEGA-3-ACID ETHYL ESTERS 1 G/1
CAPSULE, LIQUID FILLED ORAL
Qty: 180 CAPSULE | Refills: 0 | Status: SHIPPED | OUTPATIENT
Start: 2018-01-17 | End: 2018-04-15

## 2018-01-17 RX ORDER — ATENOLOL 25 MG/1
25 TABLET ORAL
Qty: 90 TABLET | Refills: 0 | Status: SHIPPED | OUTPATIENT
Start: 2018-01-17 | End: 2018-04-15

## 2018-01-17 NOTE — TELEPHONE ENCOUNTER
Patient failed protocol. Script pended. Please advise.     Cholesterol Medications  Protocol Criteria:  · Appointment scheduled in the past 12 months or in the next 3 months  · ALT & LDL on file in the past 12 months  · ALT result < 80  · LDL result <130 Office Building, [place] [address] 6 months    In 2 months Juan, Bronson Martinez, [address] [facility], [address] [place] [address] 1 YEAR          Lab Results  Component Value Date   LDL 70 01/14/2017       Lab Results  Component Value Date   ALT

## 2018-01-17 NOTE — TELEPHONE ENCOUNTER
Hypertensive Medications  Protocol Criteria:  · Appointment scheduled in the past 6 months or in the next 3 months  · BMP or CMP in the past 12 months  · Creatinine result < 2  Recent Outpatient Visits            2 days ago Rotator cuff syndrome of left sh MD [facility], [address] [facility] [address] [place] 1 YEAR          Lab Results  Component Value Date    (H) 01/30/2017   BUN 20 01/30/2017   CREATSERUM 1.16 01/30/2017   BUNCREA 17.2 01/30/2017   GFRNAA >60 03/01/2017   GFRAA >60 03/01/2017

## 2018-01-18 ENCOUNTER — OFFICE VISIT (OUTPATIENT)
Dept: PHYSICAL THERAPY | Age: 68
End: 2018-01-18
Attending: ORTHOPAEDIC SURGERY
Payer: MEDICARE

## 2018-01-18 DIAGNOSIS — M75.102 ROTATOR CUFF SYNDROME OF LEFT SHOULDER: ICD-10-CM

## 2018-01-18 PROCEDURE — 97112 NEUROMUSCULAR REEDUCATION: CPT | Performed by: PHYSICAL THERAPIST

## 2018-01-18 PROCEDURE — 97140 MANUAL THERAPY 1/> REGIONS: CPT | Performed by: PHYSICAL THERAPIST

## 2018-01-18 NOTE — PROGRESS NOTES
Name: Clifton Marquez  Dx: Rotator cuff syndrome of left shoulder (M75.102)          Authorized # of Visits:  3/12         Next MD visit: none scheduled  Fall Risk: standard         Precautions: n/a           Medication Changes since last visit?: No deficient areas for ease with donning a jacket. 3. Patient will demonstrates increased  UE strength by one half MMT grade to improve shoulder girdle control with piano tuning.   4. Patient to improve FOTO outcomes score to less than or equal to 15% impairm

## 2018-01-22 ENCOUNTER — OFFICE VISIT (OUTPATIENT)
Dept: PHYSICAL THERAPY | Age: 68
End: 2018-01-22
Attending: ORTHOPAEDIC SURGERY
Payer: MEDICARE

## 2018-01-22 DIAGNOSIS — M75.102 ROTATOR CUFF SYNDROME OF LEFT SHOULDER: ICD-10-CM

## 2018-01-22 PROCEDURE — 97112 NEUROMUSCULAR REEDUCATION: CPT | Performed by: PHYSICAL THERAPIST

## 2018-01-22 PROCEDURE — 97140 MANUAL THERAPY 1/> REGIONS: CPT | Performed by: PHYSICAL THERAPIST

## 2018-01-22 NOTE — PROGRESS NOTES
Name: Ramez Villatoro  Dx: Rotator cuff syndrome of left shoulder (M75.102)          Authorized # of Visits:  4/12         Next MD visit: none scheduled  Fall Risk: standard         Precautions: n/a           Medication Changes since last visit?: No compliance with existing program to address symptoms and improve mechanics. Goals:   Long Term Goals Timeframe (6 weeks, 12 visits)  1.  Patient to be independent with progressive HEP during episode of care and upon discharge to aide with symptom managem

## 2018-01-25 ENCOUNTER — OFFICE VISIT (OUTPATIENT)
Dept: PHYSICAL THERAPY | Age: 68
End: 2018-01-25
Attending: ORTHOPAEDIC SURGERY
Payer: MEDICARE

## 2018-01-25 DIAGNOSIS — M75.102 ROTATOR CUFF SYNDROME OF LEFT SHOULDER: ICD-10-CM

## 2018-01-25 PROCEDURE — 97140 MANUAL THERAPY 1/> REGIONS: CPT | Performed by: PHYSICAL THERAPIST

## 2018-01-25 PROCEDURE — 97112 NEUROMUSCULAR REEDUCATION: CPT | Performed by: PHYSICAL THERAPIST

## 2018-01-25 NOTE — PROGRESS NOTES
Name: Magdalena De Guzman  Dx: Rotator cuff syndrome of left shoulder (M75.102)          Authorized # of Visits:  5/12         Next MD visit: none scheduled  Fall Risk: standard         Precautions: n/a           Medication Changes since last visit?: No Assessment:   Emphasized scapular mobilization and followed with active movement to aid in restoring scapular mobility and appropriate stability for functional tasks.  Patient was able to complete arm circles and telescoping arms without

## 2018-01-29 ENCOUNTER — TELEPHONE (OUTPATIENT)
Dept: PHYSICAL THERAPY | Age: 68
End: 2018-01-29

## 2018-01-29 ENCOUNTER — APPOINTMENT (OUTPATIENT)
Dept: PHYSICAL THERAPY | Age: 68
End: 2018-01-29
Attending: ORTHOPAEDIC SURGERY
Payer: MEDICARE

## 2018-02-01 ENCOUNTER — APPOINTMENT (OUTPATIENT)
Dept: LAB | Facility: HOSPITAL | Age: 68
End: 2018-02-01
Attending: INTERNAL MEDICINE
Payer: MEDICARE

## 2018-02-01 ENCOUNTER — OFFICE VISIT (OUTPATIENT)
Dept: PHYSICAL THERAPY | Age: 68
End: 2018-02-01
Attending: ORTHOPAEDIC SURGERY
Payer: MEDICARE

## 2018-02-01 DIAGNOSIS — E11.649 CONTROLLED TYPE 2 DIABETES MELLITUS WITH HYPOGLYCEMIA, WITHOUT LONG-TERM CURRENT USE OF INSULIN (HCC): ICD-10-CM

## 2018-02-01 DIAGNOSIS — M75.102 ROTATOR CUFF SYNDROME OF LEFT SHOULDER: ICD-10-CM

## 2018-02-01 DIAGNOSIS — E11.69 DYSLIPIDEMIA ASSOCIATED WITH TYPE 2 DIABETES MELLITUS (HCC): ICD-10-CM

## 2018-02-01 DIAGNOSIS — E78.5 DYSLIPIDEMIA ASSOCIATED WITH TYPE 2 DIABETES MELLITUS (HCC): ICD-10-CM

## 2018-02-01 LAB
ALBUMIN SERPL BCP-MCNC: 4.6 G/DL (ref 3.5–4.8)
ALBUMIN/GLOB SERPL: 1.3 {RATIO} (ref 1–2)
ALP SERPL-CCNC: 26 U/L (ref 32–100)
ALT SERPL-CCNC: 36 U/L (ref 17–63)
ANION GAP SERPL CALC-SCNC: 12 MMOL/L (ref 0–18)
AST SERPL-CCNC: 31 U/L (ref 15–41)
BILIRUB SERPL-MCNC: 0.9 MG/DL (ref 0.3–1.2)
BUN SERPL-MCNC: 23 MG/DL (ref 8–20)
BUN/CREAT SERPL: 20.7 (ref 10–20)
CALCIUM SERPL-MCNC: 10 MG/DL (ref 8.5–10.5)
CHLORIDE SERPL-SCNC: 101 MMOL/L (ref 95–110)
CHOLEST SERPL-MCNC: 166 MG/DL (ref 110–200)
CO2 SERPL-SCNC: 23 MMOL/L (ref 22–32)
CREAT SERPL-MCNC: 1.11 MG/DL (ref 0.5–1.5)
CREAT UR-MCNC: 91.6 MG/DL
GLOBULIN PLAS-MCNC: 3.5 G/DL (ref 2.5–3.7)
GLUCOSE SERPL-MCNC: 127 MG/DL (ref 70–99)
HDLC SERPL-MCNC: 42 MG/DL
LDLC SERPL CALC-MCNC: 83 MG/DL (ref 0–99)
MICROALBUMIN UR-MCNC: 1.7 MG/DL (ref 0–1.8)
MICROALBUMIN/CREAT UR: 18.6 MG/G{CREAT} (ref 0–20)
NONHDLC SERPL-MCNC: 124 MG/DL
OSMOLALITY UR CALC.SUM OF ELEC: 287 MOSM/KG (ref 275–295)
POTASSIUM SERPL-SCNC: 3.9 MMOL/L (ref 3.3–5.1)
PROT SERPL-MCNC: 8.1 G/DL (ref 5.9–8.4)
SODIUM SERPL-SCNC: 136 MMOL/L (ref 136–144)
TRIGL SERPL-MCNC: 206 MG/DL (ref 1–149)

## 2018-02-01 PROCEDURE — 80061 LIPID PANEL: CPT

## 2018-02-01 PROCEDURE — 82570 ASSAY OF URINE CREATININE: CPT

## 2018-02-01 PROCEDURE — 80053 COMPREHEN METABOLIC PANEL: CPT

## 2018-02-01 PROCEDURE — 97110 THERAPEUTIC EXERCISES: CPT | Performed by: PHYSICAL THERAPIST

## 2018-02-01 PROCEDURE — 97112 NEUROMUSCULAR REEDUCATION: CPT | Performed by: PHYSICAL THERAPIST

## 2018-02-01 PROCEDURE — 36415 COLL VENOUS BLD VENIPUNCTURE: CPT

## 2018-02-01 PROCEDURE — 82043 UR ALBUMIN QUANTITATIVE: CPT

## 2018-02-01 NOTE — PROGRESS NOTES
Name: Elie Garcia  Dx: Rotator cuff syndrome of left shoulder (M75.102)          Authorized # of Visits:  6/12         Next MD visit: none scheduled  Fall Risk: standard         Precautions: n/a           Medication Changes since last visit?: No sec  - Telescoping arms 10x2  - Arm circles in sidelying x8 each direction - Serratus retraining with holds 8x2, 8 sec  - Lower trap set and hold (palm down, thumb up) x10, 5 sec  - Telescoping arms 10x2  - Arm circles in sidelying x8 each direction  - Sta Time: 40 min

## 2018-02-05 ENCOUNTER — OFFICE VISIT (OUTPATIENT)
Dept: INTERNAL MEDICINE CLINIC | Facility: CLINIC | Age: 68
End: 2018-02-05

## 2018-02-05 VITALS
WEIGHT: 199.63 LBS | SYSTOLIC BLOOD PRESSURE: 150 MMHG | HEIGHT: 67.5 IN | HEART RATE: 58 BPM | RESPIRATION RATE: 18 BRPM | DIASTOLIC BLOOD PRESSURE: 74 MMHG | TEMPERATURE: 97 F | BODY MASS INDEX: 30.97 KG/M2

## 2018-02-05 DIAGNOSIS — Z00.00 ENCOUNTER FOR ANNUAL HEALTH EXAMINATION: Primary | ICD-10-CM

## 2018-02-05 DIAGNOSIS — Z12.5 SCREENING FOR PROSTATE CANCER: ICD-10-CM

## 2018-02-05 DIAGNOSIS — M25.512 ACUTE PAIN OF LEFT SHOULDER: ICD-10-CM

## 2018-02-05 DIAGNOSIS — E78.5 HYPERLIPIDEMIA, UNSPECIFIED HYPERLIPIDEMIA TYPE: ICD-10-CM

## 2018-02-05 DIAGNOSIS — Z23 NEED FOR VACCINATION: ICD-10-CM

## 2018-02-05 DIAGNOSIS — E11.9 TYPE 2 DIABETES MELLITUS WITHOUT COMPLICATION, WITHOUT LONG-TERM CURRENT USE OF INSULIN (HCC): ICD-10-CM

## 2018-02-05 DIAGNOSIS — I10 ESSENTIAL HYPERTENSION: ICD-10-CM

## 2018-02-05 PROCEDURE — G0439 PPPS, SUBSEQ VISIT: HCPCS | Performed by: INTERNAL MEDICINE

## 2018-02-05 PROCEDURE — 90732 PPSV23 VACC 2 YRS+ SUBQ/IM: CPT | Performed by: INTERNAL MEDICINE

## 2018-02-05 PROCEDURE — G0009 ADMIN PNEUMOCOCCAL VACCINE: HCPCS | Performed by: INTERNAL MEDICINE

## 2018-02-05 RX ORDER — OMEGA-3-ACID ETHYL ESTERS 1 G/1
CAPSULE, LIQUID FILLED ORAL
COMMUNITY
Start: 2017-09-19 | End: 2018-02-05

## 2018-02-05 RX ORDER — LISINOPRIL 40 MG/1
40 TABLET ORAL DAILY
Qty: 90 TABLET | Refills: 3 | Status: SHIPPED | OUTPATIENT
Start: 2018-02-05 | End: 2019-01-14

## 2018-02-05 NOTE — PROGRESS NOTES
HPI:   Katey Cohen is a 69year old male who presents for a Medicare Subsequent Annual Wellness visit (Pt already had Initial Annual Wellness).     Patient is here requesting Medicare annual wellness visit and follow-up on chronic medical issues a advance directives standard forms performed Face to Face with patient and Family/surrogate (if present), and forms available to patient in AVS       He does have a POA but we do NOT have it on file in [facility].    Advance care planning including the explanation SIMVASTATIN 40 MG Oral Tab TAKE 1 TABLET (40 MG TOTAL) BY MOUTH NIGHTLY. FENOFIBRATE 145 MG Oral Tab TAKE 1/2 TABLET BY MOUTH DAILY   LISINOPRIL 20 MG Oral Tab TAKE 1 TABLET (20 MG TOTAL) BY MOUTH DAILY.    METFORMIN  MG Oral Tab TAKE 2 TABLETS B headaches. Hematological: Does not bruise/bleed easily. Psychiatric/Behavioral: Negative for depressed mood. The patient is not nervous/anxious.           EXAM:   /74 (BP Location: Right arm, Patient Position: Sitting, Cuff Size: large)   Pulse 58 Visual Acuity: Corrected Left Eye Chart Acuity: 20/25   Both Eyes Visual Acuity: Corrected Both Eyes Chart Acuity: 20/20   Able To Tolerate Visual Acuity: Yes      Physical Exam    Constitutional: He appears well-developed and well-nourished.    HENT:   Rig who presents for a Medicare Assessment. PLAN SUMMARY:   (Z00.00) Encounter for annual health examination  (primary encounter diagnosis)  Plan: Patient here for Medicare annual wellness visit.   Physical exam unremarkable other than elevated blood pressu your appetite been poor?: No  How does the patient maintain a good energy level?: Appropriate Exercise  How would you describe your daily physical activity?: Moderate  How would you describe your current health state?: Good  How do you maintain positive me Medium/high risk factors:   End-stage renal disease   Hemophiliacs who received Factor VIII or IX concentrates   Clients of institutions for the mentally retarded   Persons who live in the same house as a HepB virus carrier   Homosexual men   Illicit injec

## 2018-02-05 NOTE — PATIENT INSTRUCTIONS
Ryan Jones's SCREENING SCHEDULE   Tests on this list are recommended by your physician but may not be covered, or covered at this frequency, by your insurer. Please check with your insurance carrier before scheduling to verify coverage.     Lorraine Martinez Colonoscopy Screen   Covered every 10 years- more often if abnormal Colonoscopy,5 Years due on 04/01/2021 Update Health Maintenance if applicable    Flex Sigmoidoscopy Screen  Covered every 5 years No results found for this or any previous visit.  No flowsh This may be covered with your prescription benefits, but Medicare does not cover unless Medically needed    Zoster (Not covered by Medicare Part B) No orders found for this or any previous visit.  This may be covered with your pharmacy  prescription benefit

## 2018-02-06 ENCOUNTER — LAB ENCOUNTER (OUTPATIENT)
Dept: LAB | Facility: HOSPITAL | Age: 68
End: 2018-02-06
Attending: INTERNAL MEDICINE
Payer: MEDICARE

## 2018-02-06 ENCOUNTER — OFFICE VISIT (OUTPATIENT)
Dept: PHYSICAL THERAPY | Age: 68
End: 2018-02-06
Attending: ORTHOPAEDIC SURGERY
Payer: MEDICARE

## 2018-02-06 DIAGNOSIS — M75.102 ROTATOR CUFF SYNDROME OF LEFT SHOULDER: ICD-10-CM

## 2018-02-06 DIAGNOSIS — Z12.5 SCREENING FOR PROSTATE CANCER: ICD-10-CM

## 2018-02-06 DIAGNOSIS — E11.9 TYPE 2 DIABETES MELLITUS WITHOUT COMPLICATION, WITHOUT LONG-TERM CURRENT USE OF INSULIN (HCC): ICD-10-CM

## 2018-02-06 LAB
BASOPHILS # BLD: 0.1 K/UL (ref 0–0.2)
BASOPHILS NFR BLD: 1 %
EOSINOPHIL # BLD: 0.3 K/UL (ref 0–0.7)
EOSINOPHIL NFR BLD: 4 %
ERYTHROCYTE [DISTWIDTH] IN BLOOD BY AUTOMATED COUNT: 14.3 % (ref 11–15)
HCT VFR BLD AUTO: 51.1 % (ref 41–52)
HGB BLD-MCNC: 16.8 G/DL (ref 13.5–17.5)
LYMPHOCYTES # BLD: 2 K/UL (ref 1–4)
LYMPHOCYTES NFR BLD: 28 %
MCH RBC QN AUTO: 31.2 PG (ref 27–32)
MCHC RBC AUTO-ENTMCNC: 32.9 G/DL (ref 32–37)
MCV RBC AUTO: 95 FL (ref 80–100)
MONOCYTES # BLD: 0.7 K/UL (ref 0–1)
MONOCYTES NFR BLD: 10 %
NEUTROPHILS # BLD AUTO: 4.1 K/UL (ref 1.8–7.7)
NEUTROPHILS NFR BLD: 57 %
PLATELET # BLD AUTO: 199 K/UL (ref 140–400)
PMV BLD AUTO: 8.8 FL (ref 7.4–10.3)
PSA SERPL-MCNC: 3.6 NG/ML (ref 0–4)
RBC # BLD AUTO: 5.37 M/UL (ref 4.5–5.9)
TSH SERPL-ACNC: 3.82 UIU/ML (ref 0.45–5.33)
WBC # BLD AUTO: 7.1 K/UL (ref 4–11)

## 2018-02-06 PROCEDURE — 97112 NEUROMUSCULAR REEDUCATION: CPT | Performed by: PHYSICAL THERAPIST

## 2018-02-06 PROCEDURE — 36415 COLL VENOUS BLD VENIPUNCTURE: CPT

## 2018-02-06 PROCEDURE — 84443 ASSAY THYROID STIM HORMONE: CPT

## 2018-02-06 PROCEDURE — 85025 COMPLETE CBC W/AUTO DIFF WBC: CPT

## 2018-02-06 PROCEDURE — 97140 MANUAL THERAPY 1/> REGIONS: CPT | Performed by: PHYSICAL THERAPIST

## 2018-02-06 NOTE — PROGRESS NOTES
Name: Po Salvador  Dx: Rotator cuff syndrome of left shoulder (M75.102)          Authorized # of Visits:  7/12         Next MD visit: none scheduled  Fall Risk: standard         Precautions: n/a           Medication Changes since last visit?: No set and hold (palm down, thumb up) x10, 5 sec  - Telescoping arms 10x2  - Arm circles in sidelying x8 each direction  - Standing Y 8x2  - Pivot prone 10x3  - Lat lengthening with shoulder flexion at wall x8 - Sidelying scap set 10x2  - Serratus retraining

## 2018-02-08 ENCOUNTER — OFFICE VISIT (OUTPATIENT)
Dept: PHYSICAL THERAPY | Age: 68
End: 2018-02-08
Attending: ORTHOPAEDIC SURGERY
Payer: MEDICARE

## 2018-02-08 DIAGNOSIS — M75.102 ROTATOR CUFF SYNDROME OF LEFT SHOULDER: ICD-10-CM

## 2018-02-08 PROCEDURE — 97112 NEUROMUSCULAR REEDUCATION: CPT | Performed by: PHYSICAL THERAPIST

## 2018-02-08 PROCEDURE — 97140 MANUAL THERAPY 1/> REGIONS: CPT | Performed by: PHYSICAL THERAPIST

## 2018-02-08 NOTE — PROGRESS NOTES
Name: Naima Chu  Dx: Rotator cuff syndrome of left shoulder (M75.102)          Authorized # of Visits:  8/12         Next MD visit: none scheduled  Fall Risk: standard         Precautions: n/a           Medication Changes since last visit?: No (palm down, thumb up) x10, 5 sec  - Quadruped neutral spine   - Quadruped hand heel rocking  - Standing Y 8x2  - Pivot prone 10x3 - Serratus retraining with holds 8x2, 8 sec  - Lower trap set and hold (palm down, thumb up) x10, 5 sec  - Quadruped neutral s

## 2018-02-12 ENCOUNTER — TELEPHONE (OUTPATIENT)
Dept: ENDOCRINOLOGY CLINIC | Facility: CLINIC | Age: 68
End: 2018-02-12

## 2018-02-12 RX ORDER — LANCETS 28 GAUGE
EACH MISCELLANEOUS
Qty: 100 EACH | Refills: 2 | Status: SHIPPED | OUTPATIENT
Start: 2018-02-12 | End: 2018-02-13

## 2018-02-13 RX ORDER — LANCETS 28 GAUGE
EACH MISCELLANEOUS
Qty: 100 EACH | Refills: 2 | Status: SHIPPED | OUTPATIENT
Start: 2018-02-13 | End: 2019-07-02

## 2018-02-13 NOTE — TELEPHONE ENCOUNTER
Pharmacy states that they can't take telephone request.  They will need hard copy or fax with ICD 10 written on RX for Medicare B coverage. Please fax to [phone].

## 2018-02-16 ENCOUNTER — OFFICE VISIT (OUTPATIENT)
Dept: PHYSICAL THERAPY | Age: 68
End: 2018-02-16
Attending: ORTHOPAEDIC SURGERY
Payer: MEDICARE

## 2018-02-16 PROCEDURE — 97112 NEUROMUSCULAR REEDUCATION: CPT | Performed by: PHYSICAL THERAPIST

## 2018-02-16 PROCEDURE — 97140 MANUAL THERAPY 1/> REGIONS: CPT | Performed by: PHYSICAL THERAPIST

## 2018-02-16 NOTE — PROGRESS NOTES
Name: Suzanna Fry  Dx: Rotator cuff syndrome of left shoulder (M75.102)          Authorized # of Visits:  9/12         Next MD visit: none scheduled  Fall Risk: standard         Precautions: n/a           Medication Changes since last visit?: No x10, 5 sec  - Quadruped neutral spine   - Quadruped hand heel rocking  - Standing Y 8x2  - staggered stance reaching (cross midline) 15x2 each, bilaterally - Serratus retraining with holds 8x2, 8 sec  - Quadruped neutral spine   - Quadruped hand heel rocki

## 2018-02-22 ENCOUNTER — OFFICE VISIT (OUTPATIENT)
Dept: PHYSICAL THERAPY | Age: 68
End: 2018-02-22
Attending: ORTHOPAEDIC SURGERY
Payer: MEDICARE

## 2018-02-22 PROCEDURE — 97112 NEUROMUSCULAR REEDUCATION: CPT | Performed by: PHYSICAL THERAPIST

## 2018-02-22 PROCEDURE — 97140 MANUAL THERAPY 1/> REGIONS: CPT | Performed by: PHYSICAL THERAPIST

## 2018-02-22 PROCEDURE — 97110 THERAPEUTIC EXERCISES: CPT | Performed by: PHYSICAL THERAPIST

## 2018-02-22 NOTE — PROGRESS NOTES
Name: Isabella Jacome  Dx: Rotator cuff syndrome of left shoulder (M75.102)          Authorized # of Visits:  10/12         Next MD visit: none scheduled  Fall Risk: standard         Precautions: n/a           Medication Changes since last visit?: No palms up/palms down   Manual Therapy - Scapular mobilization in sidelying - Scapular mobilization in sidelying  - MFR pectorals, subclavius, clearing clavicle - MFR pectorals, subclavius, clearing clavicle  - Scapular mobilization  - Scapular upward rotati position. This will aid in facilitating scapular stabilization and increase strength through posterior chain. Goals:   Long Term Goals Timeframe (6 weeks, 5 visits)  1.  Patient to be independent with progressive HEP during episode of care and upon disc Patient was advised of these findings, precautions, and treatment options and has agreed to actively participate in planning and for this course of care. Thank you for your referral and the opportunity to assist in your patient's rehabilitation.  Tamia

## 2018-02-27 ENCOUNTER — OFFICE VISIT (OUTPATIENT)
Dept: PHYSICAL THERAPY | Age: 68
End: 2018-02-27
Attending: ORTHOPAEDIC SURGERY
Payer: MEDICARE

## 2018-02-27 PROCEDURE — 97112 NEUROMUSCULAR REEDUCATION: CPT | Performed by: PHYSICAL THERAPIST

## 2018-02-27 PROCEDURE — 97140 MANUAL THERAPY 1/> REGIONS: CPT | Performed by: PHYSICAL THERAPIST

## 2018-02-27 NOTE — PROGRESS NOTES
Name: Kaveh Sam  Dx: Rotator cuff syndrome of left shoulder (M75.102)          Authorized # of Visits:  11/12 (+4 pending)         Next MD visit: none scheduled  Fall Risk: standard         Precautions: n/a           Medication Changes since last holds 8x2, 8 sec  - Quadruped neutral spine   - Quadruped hand heel rocking  - Lower trap pulls (red band) 10x2  - Chest expansion with arm movement x8 - Quadruped neutral spine   - Quadruped hand heel rocking - Prone mid trap retraining 10x2  - Prone T, 1 Treatment Time: 40 min

## 2018-03-03 RX ORDER — FENOFIBRATE 145 MG/1
TABLET, COATED ORAL
Qty: 15 TABLET | Refills: 2 | Status: SHIPPED | OUTPATIENT
Start: 2018-03-03 | End: 2018-03-22

## 2018-03-08 ENCOUNTER — OFFICE VISIT (OUTPATIENT)
Dept: INTERNAL MEDICINE CLINIC | Facility: CLINIC | Age: 68
End: 2018-03-08

## 2018-03-08 VITALS
HEART RATE: 53 BPM | HEIGHT: 67.5 IN | WEIGHT: 200 LBS | BODY MASS INDEX: 31.02 KG/M2 | SYSTOLIC BLOOD PRESSURE: 130 MMHG | DIASTOLIC BLOOD PRESSURE: 72 MMHG

## 2018-03-08 DIAGNOSIS — I10 ESSENTIAL HYPERTENSION: Primary | ICD-10-CM

## 2018-03-08 PROCEDURE — 99212 OFFICE O/P EST SF 10 MIN: CPT | Performed by: PHYSICIAN ASSISTANT

## 2018-03-08 NOTE — PROGRESS NOTES
Suzanna Fry is a 69year old male. Patient presents with:  Hypertension      HPI:   Patient presents today for follow-up of hypertension.   Was last seen in the office by PCP on 2/5/18 for physical exam.  At that time increase lisinopril from 20 mg • Bell's palsy    • Diabetes [facility])    • Essential hypertension    • Hyperlipidemia    • Kidney stone    • Pyloric stenosis    • [place] Hunt syndrome (geniculate herpes zoster) 2016    Rx with prednisone      History reviewed.  No pertinent surgical histor

## 2018-03-09 ENCOUNTER — OFFICE VISIT (OUTPATIENT)
Dept: PHYSICAL THERAPY | Age: 68
End: 2018-03-09
Attending: ORTHOPAEDIC SURGERY
Payer: MEDICARE

## 2018-03-09 PROCEDURE — 97112 NEUROMUSCULAR REEDUCATION: CPT | Performed by: PHYSICAL THERAPIST

## 2018-03-09 PROCEDURE — 97140 MANUAL THERAPY 1/> REGIONS: CPT | Performed by: PHYSICAL THERAPIST

## 2018-03-09 NOTE — PROGRESS NOTES
Name: Manan Webb  Dx: Rotator cuff syndrome of left shoulder (M75.102)          Authorized # of Visits:  12/16 (+4 pending)         Next MD visit: none scheduled  Fall Risk: standard         Precautions: n/a           Medication Changes since last movement x8 - Quadruped neutral spine   - Quadruped hand heel rocking - Prone mid trap retraining 10x2  - Prone T, 10x2  - Prone shoulder ER, bilaterally 10x2  - Quadruped neutral spine   - Quadruped hand heel rocking  - staggered stance reaching (cross mi min Total Treatment Time: 40 min

## 2018-03-12 ENCOUNTER — OFFICE VISIT (OUTPATIENT)
Dept: PHYSICAL THERAPY | Age: 68
End: 2018-03-12
Attending: ORTHOPAEDIC SURGERY
Payer: MEDICARE

## 2018-03-12 PROCEDURE — 97112 NEUROMUSCULAR REEDUCATION: CPT | Performed by: PHYSICAL THERAPIST

## 2018-03-12 PROCEDURE — 97140 MANUAL THERAPY 1/> REGIONS: CPT | Performed by: PHYSICAL THERAPIST

## 2018-03-12 NOTE — PROGRESS NOTES
Name: Magdalena De Guzman  Dx: Rotator cuff syndrome of left shoulder (M75.102)          Authorized # of Visits:  13/16          Next MD visit: none scheduled  Fall Risk: standard         Precautions: n/a           Medication Changes since last visit?: No bilaterally - Lower trap set and hold (palm down, thumb up) x10, 5 sec  - Serratus retraining with holds 8x2, 8 sec  - Prone mid trap retraining 10x2  - prone scap set x 10 each  - prone shoulder ER with cues x15 each  - Quadruped hand heel rocking 10x2 bi

## 2018-03-13 RX ORDER — SIMVASTATIN 40 MG
TABLET ORAL
Qty: 90 TABLET | Refills: 0 | Status: SHIPPED | OUTPATIENT
Start: 2018-03-13 | End: 2018-03-13

## 2018-03-13 RX ORDER — SIMVASTATIN 40 MG
TABLET ORAL
Qty: 90 TABLET | Refills: 0 | Status: SHIPPED | OUTPATIENT
Start: 2018-03-13 | End: 2018-06-01

## 2018-03-16 ENCOUNTER — OFFICE VISIT (OUTPATIENT)
Dept: ENDOCRINOLOGY CLINIC | Facility: CLINIC | Age: 68
End: 2018-03-16

## 2018-03-16 VITALS
WEIGHT: 199.63 LBS | DIASTOLIC BLOOD PRESSURE: 74 MMHG | BODY MASS INDEX: 30.25 KG/M2 | SYSTOLIC BLOOD PRESSURE: 186 MMHG | HEART RATE: 61 BPM | HEIGHT: 68 IN

## 2018-03-16 DIAGNOSIS — E11.9 TYPE 2 DIABETES MELLITUS WITHOUT COMPLICATION, WITHOUT LONG-TERM CURRENT USE OF INSULIN (HCC): ICD-10-CM

## 2018-03-16 DIAGNOSIS — E78.5 DYSLIPIDEMIA: Primary | ICD-10-CM

## 2018-03-16 LAB
CARTRIDGE LOT#: ABNORMAL NUMERIC
GLUCOSE BLOOD: 129
HEMOGLOBIN A1C: 6.9 % (ref 4.3–5.6)
TEST STRIP LOT #: ABNORMAL NUMERIC

## 2018-03-16 PROCEDURE — 83036 HEMOGLOBIN GLYCOSYLATED A1C: CPT | Performed by: INTERNAL MEDICINE

## 2018-03-16 PROCEDURE — 82962 GLUCOSE BLOOD TEST: CPT | Performed by: INTERNAL MEDICINE

## 2018-03-16 PROCEDURE — 36416 COLLJ CAPILLARY BLOOD SPEC: CPT | Performed by: INTERNAL MEDICINE

## 2018-03-16 PROCEDURE — 99213 OFFICE O/P EST LOW 20 MIN: CPT | Performed by: INTERNAL MEDICINE

## 2018-03-16 NOTE — PROGRESS NOTES
Return Office Visit     CHIEF COMPLAINT:    DM    Dyslipidemia    HISTORY OF PRESENT ILLNESS:  Khanh Angel is a 69year old male who presents for follow up for for DM.      DM HISTORY:  Diagnosed in 2000       HISTORY OF DIABETES COMPLICATIONS: :  H Rfl: 3   simvastatin 40 MG Oral Tab TAKE 1 TABLET (40 MG TOTAL) BY MOUTH NIGHTLY. Disp: 90 tablet Rfl: 0         ALLERGY:    Tetanus Immune Glob*        Comment:Other reaction(s): Other (See Comments)             Allergy to horse hair/serum.     PAST MEDICA sounds, soft, non-distended, non-tender  SKIN:  no bruising or bleeding, no rashes and no lesions  EXTREMITIES: normal pulses, no edema      DATA:         A1c 6.5 % ( 3/2017) --> 6.4 % ( 9/2017) --> 6.9 % ( 3/2018)    ASSESSMENT AND PLAN:    1.  Type 2 DM:

## 2018-03-20 ENCOUNTER — OFFICE VISIT (OUTPATIENT)
Dept: PHYSICAL THERAPY | Age: 68
End: 2018-03-20
Attending: ORTHOPAEDIC SURGERY
Payer: MEDICARE

## 2018-03-20 PROCEDURE — 97140 MANUAL THERAPY 1/> REGIONS: CPT | Performed by: PHYSICAL THERAPIST

## 2018-03-20 PROCEDURE — 97112 NEUROMUSCULAR REEDUCATION: CPT | Performed by: PHYSICAL THERAPIST

## 2018-03-20 NOTE — PROGRESS NOTES
Name: Minh [place]  Dx: Rotator cuff syndrome of left shoulder (M75.102)          Authorized # of Visits:  14/16          Next MD visit: none scheduled  Fall Risk: standard         Precautions: n/a           Medication Changes since last visit?: No with holds 8x2, 8 sec  - Prone mid trap retraining 10x2  - prone scap set x 10 each  - prone shoulder ER with cues x15 each  - Quadruped hand heel rocking 10x2 bilaterally  - Standing Y 10x2 - Lower trap set and hold (palm down, thumb up) x10, 5 sec  - Ser

## 2018-03-22 ENCOUNTER — OFFICE VISIT (OUTPATIENT)
Dept: SURGERY | Facility: CLINIC | Age: 68
End: 2018-03-22

## 2018-03-22 VITALS
HEIGHT: 68 IN | TEMPERATURE: 98 F | SYSTOLIC BLOOD PRESSURE: 130 MMHG | WEIGHT: 200 LBS | BODY MASS INDEX: 30.31 KG/M2 | HEART RATE: 60 BPM | DIASTOLIC BLOOD PRESSURE: 60 MMHG | RESPIRATION RATE: 16 BRPM

## 2018-03-22 DIAGNOSIS — N20.0 KIDNEY STONES: Primary | ICD-10-CM

## 2018-03-22 PROCEDURE — 99214 OFFICE O/P EST MOD 30 MIN: CPT | Performed by: UROLOGY

## 2018-03-22 PROCEDURE — G0463 HOSPITAL OUTPT CLINIC VISIT: HCPCS | Performed by: UROLOGY

## 2018-03-22 NOTE — PROGRESS NOTES
[address] Patient Status:  Outpatient    1950 MRN [id number]   Location [address] [facility] Attending Amanda Willis.   [address] Day # 0 PCP MD Kim Mathew morning and 2 tablets in the evening) Disp: 120 tablet Rfl: 2   Meloxicam 15 MG Oral Tab Take 1 tablet (15 mg total) by mouth daily. Disp: 30 tablet Rfl: 1   Glucose Blood (FREESTYLE LITE TEST) In Vitro Strip Check blood sugar up to (4) four times a day.  E (1.727 m)          PHYSICAL EXAM:  Patient is a well-developed well-nourished white male in no apparent distress appropriate for his stated age alert oriented ×3 in agreeable with normal affect.   Vital signs are normal as reviewed above weight stable at 20 primary care physician if it is positive we will instruct patient when x-rays are known. I answered all his questions spent 30 minutes with patient and well over half time face-to-face discussion of further evaluation and therapy          Zhao Martinez,

## 2018-03-26 ENCOUNTER — OFFICE VISIT (OUTPATIENT)
Dept: PHYSICAL THERAPY | Age: 68
End: 2018-03-26
Attending: ORTHOPAEDIC SURGERY
Payer: MEDICARE

## 2018-03-26 PROCEDURE — 97140 MANUAL THERAPY 1/> REGIONS: CPT | Performed by: PHYSICAL THERAPIST

## 2018-03-26 PROCEDURE — 97110 THERAPEUTIC EXERCISES: CPT | Performed by: PHYSICAL THERAPIST

## 2018-03-26 PROCEDURE — 97112 NEUROMUSCULAR REEDUCATION: CPT | Performed by: PHYSICAL THERAPIST

## 2018-03-26 NOTE — PROGRESS NOTES
Name: Ramez Villatoro  Dx: Rotator cuff syndrome of left shoulder (M75.102)          Authorized # of Visits:  15/16          Next MD visit: none scheduled  Fall Risk: standard         Precautions: n/a           Medication Changes since last visit?: No 10x2 bilaterally  - Prone mid trap retraining 10x2  - prone scap set x 10 each - Scapular clock  - Lower trap set and hold (palm down, thumb up) x10, 5 sec  - Serratus retraining with holds 8x2, 8 sec  - Quadruped hand heel rocking 10x2 bilaterally      Th

## 2018-04-02 ENCOUNTER — OFFICE VISIT (OUTPATIENT)
Dept: PHYSICAL THERAPY | Age: 68
End: 2018-04-02
Attending: ORTHOPAEDIC SURGERY
Payer: MEDICARE

## 2018-04-02 PROCEDURE — 97530 THERAPEUTIC ACTIVITIES: CPT | Performed by: PHYSICAL THERAPIST

## 2018-04-02 PROCEDURE — 97110 THERAPEUTIC EXERCISES: CPT | Performed by: PHYSICAL THERAPIST

## 2018-04-02 PROCEDURE — 97140 MANUAL THERAPY 1/> REGIONS: CPT | Performed by: PHYSICAL THERAPIST

## 2018-04-02 NOTE — PROGRESS NOTES
PHYSICAL THERAPY DISCHARGE REPORT    Name: Alesia Harris  Dx: Rotator cuff syndrome of left shoulder (M75.102)          Authorized # of Visits:  16/16          Next MD visit: none scheduled  Fall Risk: standard         Precautions: n/a           Medi Reassessment completed today   Manual Therapy - MFR pectorals, subclavius, subscapularis  - Scapular mobilization in sidelying  - manual stretching for pectorals - MFR pectorals, subclavius, subscapularis, lat insertion  - Scapular mobilization in sidelyin discharged from outpatient physical therapy. Goals:   Long Term Goals Timeframe (6 weeks, 5 visits)  1.  Patient to be independent with progressive HEP during episode of care and upon discharge to aide with symptom management and return to previous level

## 2018-04-16 RX ORDER — OMEGA-3-ACID ETHYL ESTERS 1 G/1
CAPSULE, LIQUID FILLED ORAL
Qty: 180 CAPSULE | Refills: 0 | Status: SHIPPED | OUTPATIENT
Start: 2018-04-16 | End: 2018-07-25

## 2018-04-16 RX ORDER — ATENOLOL 25 MG/1
25 TABLET ORAL
Qty: 90 TABLET | Refills: 0 | Status: SHIPPED | OUTPATIENT
Start: 2018-04-16 | End: 2018-07-16

## 2018-04-16 NOTE — TELEPHONE ENCOUNTER
Refilled per protocol    Cholesterol Medications  Protocol Criteria:  · Appointment scheduled in the past 12 months or in the next 3 months  · ALT & LDL on file in the past 12 months  · ALT result < 80  · LDL result <130   Recent Outpatient Visits Department Appt Notes    In 3 months Florentin Booth MD [facility], [facility], [address] [place]           Lab Results  Component Value Date    (H) 02/01/2018   BUN 23 (H) 02/01/2018   CREATSERUM 1.11 02/01/2018   BUNCREA 20.7 (H) 02/01/2018   GFRNAA >

## 2018-04-18 NOTE — TELEPHONE ENCOUNTER
Current Outpatient Prescriptions:  METFORMIN  MG Oral Tab TAKE 2 TABLETS BY MOUTH TWICE A DAY (Patient taking differently: Take 1 tablet by mouth in the morning and 2 tablets in the evening) Disp: 120 tablet Rfl: 2     Refill 90 day

## 2018-04-30 RX ORDER — PIOGLITAZONEHYDROCHLORIDE 30 MG/1
TABLET ORAL
Qty: 90 TABLET | Refills: 0 | Status: SHIPPED | OUTPATIENT
Start: 2018-04-30 | End: 2018-07-25

## 2018-05-29 ENCOUNTER — HOSPITAL ENCOUNTER (OUTPATIENT)
Dept: CT IMAGING | Facility: HOSPITAL | Age: 68
Discharge: HOME | End: 2018-05-29
Attending: UROLOGY
Payer: MEDICARE

## 2018-05-29 DIAGNOSIS — N20.0 KIDNEY STONES: ICD-10-CM

## 2018-05-29 PROCEDURE — 74176 CT ABD & PELVIS W/O CONTRAST: CPT | Performed by: UROLOGY

## 2018-05-30 ENCOUNTER — TELEPHONE (OUTPATIENT)
Dept: SURGERY | Facility: CLINIC | Age: 68
End: 2018-05-30

## 2018-05-30 NOTE — TELEPHONE ENCOUNTER
----- Message from Neelam John MD sent at 5/29/2018 10:49 AM CDT -----  Please contact patient with negative CT renal stone study showing no stones.   Patient's abdominal pain would not be urologic in nature

## 2018-06-01 RX ORDER — SIMVASTATIN 40 MG
TABLET ORAL
Qty: 90 TABLET | Refills: 0 | Status: SHIPPED | OUTPATIENT
Start: 2018-06-01 | End: 2018-08-27

## 2018-06-04 NOTE — TELEPHONE ENCOUNTER
Pt is returning the call for ct scan results. Pt requesting a call back on Wednesday 6-6-18 in the morning.

## 2018-06-19 ENCOUNTER — TELEPHONE (OUTPATIENT)
Dept: SURGERY | Facility: CLINIC | Age: 68
End: 2018-06-19

## 2018-07-16 RX ORDER — ATENOLOL 25 MG/1
25 TABLET ORAL
Qty: 90 TABLET | Refills: 0 | Status: SHIPPED | OUTPATIENT
Start: 2018-07-16 | End: 2018-10-12

## 2018-07-17 NOTE — TELEPHONE ENCOUNTER
Hypertensive Medications  Protocol Criteria:  · Appointment scheduled in the past 6 months or in the next 3 months  · BMP or CMP in the past 12 months  · Creatinine result < 2  Recent Outpatient Visits            3 months ago     [facility]

## 2018-07-25 RX ORDER — OMEGA-3-ACID ETHYL ESTERS 1 G/1
CAPSULE, LIQUID FILLED ORAL
Qty: 180 CAPSULE | Refills: 0 | Status: SHIPPED | OUTPATIENT
Start: 2018-07-25 | End: 2018-10-23

## 2018-07-25 NOTE — TELEPHONE ENCOUNTER
Cholesterol Medication Protocol Passed7/25 4:11 PM   ALT in past 12 months    LDL in past 12 months    Last ALT < 80    Last LDL < 130    Appointment in past 12 or next 3 months   Medication refilled for 90 days per protocol.

## 2018-07-26 RX ORDER — PIOGLITAZONEHYDROCHLORIDE 30 MG/1
TABLET ORAL
Qty: 90 TABLET | Refills: 0 | Status: SHIPPED | OUTPATIENT
Start: 2018-07-26 | End: 2018-10-23

## 2018-08-01 NOTE — TELEPHONE ENCOUNTER
[id number] - [place], [place] -  HANNAH PALACIO.  AT [address] [place] [address], [phone], [phone]   Medication Detail      Disp Refills Start End    METFORMIN  MG Oral Tab 120 tablet 2 7/30/2018     Sig: TAKE 2 TABLETS BY MOUTH TWICE A DAY

## 2018-08-27 RX ORDER — SIMVASTATIN 40 MG
TABLET ORAL
Qty: 30 TABLET | Refills: 0 | Status: SHIPPED | OUTPATIENT
Start: 2018-08-27 | End: 2018-09-24

## 2018-08-27 NOTE — TELEPHONE ENCOUNTER
LOV 3/16/18. RTC 6 months. No F/U scheduled. Gamersbandt message sent that he is due for an appt. 1 month refill pending.

## 2018-09-06 ENCOUNTER — OFFICE VISIT (OUTPATIENT)
Dept: INTERNAL MEDICINE CLINIC | Facility: CLINIC | Age: 68
End: 2018-09-06
Payer: MEDICARE

## 2018-09-06 VITALS
SYSTOLIC BLOOD PRESSURE: 144 MMHG | WEIGHT: 199.69 LBS | RESPIRATION RATE: 20 BRPM | DIASTOLIC BLOOD PRESSURE: 74 MMHG | BODY MASS INDEX: 30.26 KG/M2 | TEMPERATURE: 98 F | HEART RATE: 56 BPM | HEIGHT: 68 IN

## 2018-09-06 DIAGNOSIS — E78.5 HYPERLIPIDEMIA, UNSPECIFIED HYPERLIPIDEMIA TYPE: ICD-10-CM

## 2018-09-06 DIAGNOSIS — I25.84 CORONARY ARTERY CALCIFICATION: ICD-10-CM

## 2018-09-06 DIAGNOSIS — I25.10 CORONARY ARTERY CALCIFICATION: ICD-10-CM

## 2018-09-06 DIAGNOSIS — I10 ESSENTIAL HYPERTENSION: Primary | ICD-10-CM

## 2018-09-06 DIAGNOSIS — E11.9 TYPE 2 DIABETES MELLITUS WITHOUT COMPLICATION, WITHOUT LONG-TERM CURRENT USE OF INSULIN (HCC): ICD-10-CM

## 2018-09-06 DIAGNOSIS — M25.512 ACUTE PAIN OF LEFT SHOULDER: ICD-10-CM

## 2018-09-06 PROCEDURE — G0463 HOSPITAL OUTPT CLINIC VISIT: HCPCS | Performed by: INTERNAL MEDICINE

## 2018-09-06 PROCEDURE — 99214 OFFICE O/P EST MOD 30 MIN: CPT | Performed by: INTERNAL MEDICINE

## 2018-09-06 NOTE — PATIENT INSTRUCTIONS
Contact our office with the blood pressure reading from the Endocrine office. Also with any readings from home. We may need to add an additional medication.

## 2018-09-06 NOTE — PROGRESS NOTES
HPI:    Patient ID: Caryn Forrest is a 69year old male. HPI  Patient is here for follow-up on chronic medical issues. Last seen here in February for annual wellness visit. At that time increased lisinopril from 20 up to 40 mg a day.   Physical t Gastrointestinal: Positive for diarrhea. Negative for nausea, vomiting, abdominal pain and constipation. Genitourinary: Negative for dysuria, hematuria and sexual dysfunction. Musculoskeletal: Negative for joint pain. Skin: Negative for rash.    Fitz normal heart sounds and intact distal pulses. Exam reveals no gallop and no friction rub. No murmur heard. Edema not present. Pulmonary/Chest: Effort normal and breath sounds normal. He has no wheezes. He has no rales. Abdominal: Soft.  Bowel sound

## 2018-09-17 ENCOUNTER — OFFICE VISIT (OUTPATIENT)
Dept: PHYSICAL THERAPY | Age: 68
End: 2018-09-17
Attending: INTERNAL MEDICINE
Payer: MEDICARE

## 2018-09-17 DIAGNOSIS — M25.512 ACUTE PAIN OF LEFT SHOULDER: ICD-10-CM

## 2018-09-17 PROCEDURE — 97530 THERAPEUTIC ACTIVITIES: CPT | Performed by: PHYSICAL THERAPIST

## 2018-09-17 PROCEDURE — 97112 NEUROMUSCULAR REEDUCATION: CPT | Performed by: PHYSICAL THERAPIST

## 2018-09-17 PROCEDURE — 97162 PT EVAL MOD COMPLEX 30 MIN: CPT | Performed by: PHYSICAL THERAPIST

## 2018-09-17 NOTE — PROGRESS NOTES
CERVICAL SPINE/UPPER EXTREMITY EVALUATION:   Referring Physician: Justin Harrington MD    Diagnosis: Acute pain of left shoulder (M25.512) Date of Service: 9/17/2018   Date of Onset: Intermittent since September 2017       SUBJECTIVE:   PATIENT SUMMARY: of Cancer N   Immune Suppression N   History of Trauma N   Night Pain, Unexplained Weight Loss, Fever or Chills N   Lower Extremity Neurological Deficit N   Vision Changes/Double Vision Y; Floaters   Headaches N   Chest Pain or Palpitations, SOB N   Dizzin arm     Strength Scapular: -/5, R/L    9/17/2018   Lower trap R: 3+ L: 3   Serratus Anterior R: 4 L: 4-     Palpation: Mild-mod tenderness to palpation at anterior and josé manuel-lateral GHJ.  Moderate increased soft tissue density at pectorals, scapulohumeral limitations, and evolving symptoms including changing pain levels.       Current status G Code: Initial, Carrying, Moving and Handling Objects, CK: 40-59% impaired, limited, or restricted  Goal status G Code: Carrying, Moving and Handling Objects CI: 1%-19%

## 2018-09-22 ENCOUNTER — TELEPHONE (OUTPATIENT)
Dept: ENDOCRINOLOGY CLINIC | Facility: CLINIC | Age: 68
End: 2018-09-22

## 2018-09-24 RX ORDER — SIMVASTATIN 40 MG
TABLET ORAL
Qty: 90 TABLET | Refills: 0 | Status: SHIPPED | OUTPATIENT
Start: 2018-09-24 | End: 2019-01-08

## 2018-10-01 ENCOUNTER — OFFICE VISIT (OUTPATIENT)
Dept: PHYSICAL THERAPY | Age: 68
End: 2018-10-01
Attending: INTERNAL MEDICINE
Payer: MEDICARE

## 2018-10-01 DIAGNOSIS — M25.512 ACUTE PAIN OF LEFT SHOULDER: ICD-10-CM

## 2018-10-01 PROCEDURE — 97112 NEUROMUSCULAR REEDUCATION: CPT | Performed by: PHYSICAL THERAPIST

## 2018-10-01 PROCEDURE — 97140 MANUAL THERAPY 1/> REGIONS: CPT | Performed by: PHYSICAL THERAPIST

## 2018-10-01 NOTE — PROGRESS NOTES
Name: Katey Cohen  Date: 10/1/2018  Dx: Acute pain of left shoulder (M25.512)         Authorized # of Visits:  10         Next MD visit: none scheduled  Fall Risk: standard         Precautions: n/a           Medication Changes since last visit?: No care.  Plan: Continue interventions to restore joint and soft tissue mechanics, increase functional mobility and return patient to prior level of function.       Charges: 1 MT, 1 TE, 1 NR       Total Timed Treatment: 40 min  Total Treatment Time: 40 min

## 2018-10-04 ENCOUNTER — OFFICE VISIT (OUTPATIENT)
Dept: PHYSICAL THERAPY | Age: 68
End: 2018-10-04
Attending: INTERNAL MEDICINE
Payer: MEDICARE

## 2018-10-04 DIAGNOSIS — M25.512 ACUTE PAIN OF LEFT SHOULDER: ICD-10-CM

## 2018-10-04 PROCEDURE — 97112 NEUROMUSCULAR REEDUCATION: CPT | Performed by: PHYSICAL THERAPIST

## 2018-10-04 PROCEDURE — 97140 MANUAL THERAPY 1/> REGIONS: CPT | Performed by: PHYSICAL THERAPIST

## 2018-10-04 NOTE — PROGRESS NOTES
Name: Magdalena De Guzman  Date: 10/4/2018  Dx: Acute pain of left shoulder (M25.512)         Authorized # of Visits:  8         Next MD visit: none scheduled  Fall Risk: standard         Precautions: n/a           Medication Changes since last visit?: No degrees in all deficient areas for ease with reaching to complete piano tuning. 4. Patient to improve FOTO outcomes score to less than or equal to 15% impairment, for functional improvement in ADLs. Currently 45% impaired.    Reviewed goals with patient on

## 2018-10-08 ENCOUNTER — OFFICE VISIT (OUTPATIENT)
Dept: PHYSICAL THERAPY | Age: 68
End: 2018-10-08
Attending: INTERNAL MEDICINE
Payer: MEDICARE

## 2018-10-08 DIAGNOSIS — M25.512 ACUTE PAIN OF LEFT SHOULDER: ICD-10-CM

## 2018-10-08 PROCEDURE — 97112 NEUROMUSCULAR REEDUCATION: CPT | Performed by: PHYSICAL THERAPIST

## 2018-10-08 PROCEDURE — 97140 MANUAL THERAPY 1/> REGIONS: CPT | Performed by: PHYSICAL THERAPIST

## 2018-10-08 NOTE — PROGRESS NOTES
Name: Brendan Li  Date: 10/4/2018  Dx: Acute pain of left shoulder (M25.512)         Authorized # of Visits:  8         Next MD visit: none scheduled  Fall Risk: standard         Precautions: n/a           Medication Changes since last visit?: No Goals Timeframe (6 weeks, 10 visits over 90 days)  1.  Patient to be independent with progressive HEP during episode of care and upon discharge to aide with symptom management and return to previous level of function.    2. Patient will demonstrate posture

## 2018-10-12 RX ORDER — ATENOLOL 25 MG/1
25 TABLET ORAL
Qty: 90 TABLET | Refills: 0 | Status: SHIPPED | OUTPATIENT
Start: 2018-10-12 | End: 2019-01-07

## 2018-10-13 NOTE — TELEPHONE ENCOUNTER
Hypertensive Medications  Protocol Criteria:  · Appointment scheduled in the past 6 months or in the next 3 months  · BMP or CMP in the past 12 months  · Creatinine result < 2  Recent Outpatient Visits            4 days ago Acute pain of left shoulder    E

## 2018-10-15 ENCOUNTER — APPOINTMENT (OUTPATIENT)
Dept: PHYSICAL THERAPY | Age: 68
End: 2018-10-15
Attending: INTERNAL MEDICINE
Payer: MEDICARE

## 2018-10-16 ENCOUNTER — TELEPHONE (OUTPATIENT)
Dept: PHYSICAL THERAPY | Age: 68
End: 2018-10-16

## 2018-10-18 ENCOUNTER — APPOINTMENT (OUTPATIENT)
Dept: PHYSICAL THERAPY | Age: 68
End: 2018-10-18
Attending: INTERNAL MEDICINE
Payer: MEDICARE

## 2018-10-22 ENCOUNTER — OFFICE VISIT (OUTPATIENT)
Dept: PHYSICAL THERAPY | Age: 68
End: 2018-10-22
Attending: INTERNAL MEDICINE
Payer: MEDICARE

## 2018-10-22 PROCEDURE — 97112 NEUROMUSCULAR REEDUCATION: CPT | Performed by: PHYSICAL THERAPIST

## 2018-10-22 PROCEDURE — 97110 THERAPEUTIC EXERCISES: CPT | Performed by: PHYSICAL THERAPIST

## 2018-10-22 PROCEDURE — 97140 MANUAL THERAPY 1/> REGIONS: CPT | Performed by: PHYSICAL THERAPIST

## 2018-10-22 NOTE — PROGRESS NOTES
PHYSICAL THERAPY DISCHARGE REPORT  Name: Conchita Cox  Date: 10/22/2018  Dx: Acute pain of left shoulder (M25.512)         Authorized # of Visits:  8         Next MD visit: none scheduled  Fall Risk: standard         Precautions: n/a           Medi clavicle and pectorals - Scap mobilization in sidelying  - Scap thoracic distraction   Neuro Re-ed - scapular clock 2x10  - serratus prolonged holds x10  - scapular upward rotation facilitation  - serratus slides at wall 2x10  - pivot prone 3x10 with modif of care. Plan: Discharge from outpatient PT.       Charges: 1 MT, 1 NR, 1 TE       Total Timed Treatment: 40 min  Total Treatment Time: 40 min

## 2018-10-23 RX ORDER — OMEGA-3-ACID ETHYL ESTERS 1 G/1
CAPSULE, LIQUID FILLED ORAL
Qty: 180 CAPSULE | Refills: 0 | Status: SHIPPED | OUTPATIENT
Start: 2018-10-23 | End: 2019-01-16

## 2018-10-23 RX ORDER — PIOGLITAZONEHYDROCHLORIDE 30 MG/1
TABLET ORAL
Qty: 30 TABLET | Refills: 0 | Status: SHIPPED | OUTPATIENT
Start: 2018-10-23 | End: 2018-11-19

## 2018-10-23 NOTE — TELEPHONE ENCOUNTER
LOV 3/16/18. RTC 5-6 months. No F/U scheduled. Sent mychart reminder that he is due for an appt. 1 month refill pending.

## 2018-10-23 NOTE — TELEPHONE ENCOUNTER
Cholesterol Medications  Protocol Criteria:  · Appointment scheduled in the past 12 months or in the next 3 months  · ALT & LDL on file in the past 12 months  · ALT result < 80  · LDL result <130   Recent Outpatient Visits            [facility]

## 2018-10-30 ENCOUNTER — IMMUNIZATION (OUTPATIENT)
Dept: INTERNAL MEDICINE CLINIC | Facility: CLINIC | Age: 68
End: 2018-10-30
Payer: MEDICARE

## 2018-10-30 ENCOUNTER — APPOINTMENT (OUTPATIENT)
Dept: PHYSICAL THERAPY | Age: 68
End: 2018-10-30
Attending: INTERNAL MEDICINE
Payer: MEDICARE

## 2018-10-30 PROCEDURE — 90653 IIV ADJUVANT VACCINE IM: CPT | Performed by: INTERNAL MEDICINE

## 2018-10-30 PROCEDURE — G0008 ADMIN INFLUENZA VIRUS VAC: HCPCS | Performed by: INTERNAL MEDICINE

## 2018-11-02 ENCOUNTER — OFFICE VISIT (OUTPATIENT)
Dept: INTERNAL MEDICINE CLINIC | Facility: CLINIC | Age: 68
End: 2018-11-02
Payer: MEDICARE

## 2018-11-02 VITALS
WEIGHT: 200.38 LBS | HEIGHT: 68 IN | BODY MASS INDEX: 30.37 KG/M2 | OXYGEN SATURATION: 94 % | HEART RATE: 93 BPM | TEMPERATURE: 98 F | DIASTOLIC BLOOD PRESSURE: 81 MMHG | SYSTOLIC BLOOD PRESSURE: 143 MMHG

## 2018-11-02 DIAGNOSIS — H61.22 LEFT EAR IMPACTED CERUMEN: ICD-10-CM

## 2018-11-02 DIAGNOSIS — J40 BRONCHITIS: Primary | ICD-10-CM

## 2018-11-02 DIAGNOSIS — I10 ESSENTIAL HYPERTENSION: ICD-10-CM

## 2018-11-02 PROCEDURE — G0463 HOSPITAL OUTPT CLINIC VISIT: HCPCS | Performed by: INTERNAL MEDICINE

## 2018-11-02 PROCEDURE — 99214 OFFICE O/P EST MOD 30 MIN: CPT | Performed by: INTERNAL MEDICINE

## 2018-11-02 RX ORDER — PROMETHAZINE HYDROCHLORIDE AND PHENYLEPHRINE HYDROCHLORIDE 6.25; 5 MG/5ML; MG/5ML
5 SYRUP ORAL 3 TIMES DAILY PRN
Qty: 118 ML | Refills: 0 | Status: SHIPPED | OUTPATIENT
Start: 2018-11-02 | End: 2018-12-21 | Stop reason: ALTCHOICE

## 2018-11-02 RX ORDER — AZITHROMYCIN 250 MG/1
TABLET, FILM COATED ORAL
Qty: 6 TABLET | Refills: 0 | Status: SHIPPED | OUTPATIENT
Start: 2018-11-02 | End: 2018-11-08

## 2018-11-02 RX ORDER — ALBUTEROL SULFATE 90 UG/1
2 AEROSOL, METERED RESPIRATORY (INHALATION) EVERY 6 HOURS
Qty: 1 INHALER | Refills: 0 | Status: SHIPPED | OUTPATIENT
Start: 2018-11-02 | End: 2019-05-30 | Stop reason: ALTCHOICE

## 2018-11-02 NOTE — PROGRESS NOTES
Harshal Asif is a 69year old male.   Patient presents with:  URI: cough, chest congestion started 3 days ago, no fever       HPI:   Pt comes as an urgent visit -- here with wife   C/c cough   C/o cough and uri sympt   Was on the plane the day before capsule Rfl: 0   METFORMIN  MG Oral Tab TAKE 2 TABLETS BY MOUTH TWICE A DAY Disp: 120 tablet Rfl: 0      Past Medical History:   Diagnosis Date   • Bell's palsy    • [facility]    • Essential hypertension    • Hyperlipidemia    • Kidney stone hypertension  Advised to follow a low-salt low-sodium diet  Left ear impacted cerumen  Advised to use Debrox eardrops over-the-counter  Other orders  -     Albuterol Sulfate HFA (PROAIR HFA) 108 (90 Base) MCG/ACT Inhalation Aero Soln;  Inhale 2 puffs into t

## 2018-11-08 ENCOUNTER — HOSPITAL ENCOUNTER (OUTPATIENT)
Dept: GENERAL RADIOLOGY | Facility: HOSPITAL | Age: 68
Discharge: HOME | End: 2018-11-08
Attending: INTERNAL MEDICINE | Admitting: INTERNAL MEDICINE
Payer: MEDICARE

## 2018-11-08 ENCOUNTER — OFFICE VISIT (OUTPATIENT)
Dept: INTERNAL MEDICINE CLINIC | Facility: CLINIC | Age: 68
End: 2018-11-08
Payer: MEDICARE

## 2018-11-08 VITALS
DIASTOLIC BLOOD PRESSURE: 80 MMHG | SYSTOLIC BLOOD PRESSURE: 158 MMHG | WEIGHT: 200 LBS | HEART RATE: 84 BPM | HEIGHT: 68 IN | BODY MASS INDEX: 30.31 KG/M2

## 2018-11-08 DIAGNOSIS — I10 ESSENTIAL HYPERTENSION: ICD-10-CM

## 2018-11-08 DIAGNOSIS — J40 BRONCHITIS: ICD-10-CM

## 2018-11-08 DIAGNOSIS — J40 BRONCHITIS: Primary | ICD-10-CM

## 2018-11-08 PROCEDURE — G0463 HOSPITAL OUTPT CLINIC VISIT: HCPCS | Performed by: INTERNAL MEDICINE

## 2018-11-08 PROCEDURE — 99213 OFFICE O/P EST LOW 20 MIN: CPT | Performed by: INTERNAL MEDICINE

## 2018-11-08 PROCEDURE — 71046 X-RAY EXAM CHEST 2 VIEWS: CPT | Performed by: INTERNAL MEDICINE

## 2018-11-08 RX ORDER — BENZONATATE 100 MG/1
100 CAPSULE ORAL 2 TIMES DAILY PRN
Qty: 10 CAPSULE | Refills: 0 | Status: SHIPPED | OUTPATIENT
Start: 2018-11-08 | End: 2018-12-21 | Stop reason: ALTCHOICE

## 2018-11-08 RX ORDER — AZITHROMYCIN 250 MG/1
TABLET, FILM COATED ORAL
Qty: 6 TABLET | Refills: 0 | Status: SHIPPED | OUTPATIENT
Start: 2018-11-08 | End: 2018-12-21 | Stop reason: ALTCHOICE

## 2018-11-08 RX ORDER — PREDNISONE 10 MG/1
TABLET ORAL
Qty: 20 TABLET | Refills: 0 | Status: SHIPPED | OUTPATIENT
Start: 2018-11-08 | End: 2018-12-21 | Stop reason: ALTCHOICE

## 2018-11-08 NOTE — PROGRESS NOTES
Harshal Asif is a 69year old male.   Patient presents with:  HTN: took meds about about 15 mins ago   Bronchitis: follow up      HPI:   Pt comes as an urgent  visit   C/c head congestion  C/o head \" still full of stuff\" -- states he does feel bett tablet (40 mg total) by mouth daily.  Disp: 90 tablet Rfl: 3   latanoprost 0.005 % Ophthalmic Solution INSTILL 1 DROP INTO BOTH EYES EVERY EVEING Disp:  Rfl: 3   Glucose Blood (FREESTYLE LITE TEST) In Vitro Strip Check blood sugar up to (4) four times a day (CPT=71046); Future  -     predniSONE 10 MG Oral Tab; Take 4 pills for 2 days. Then, take 3 pills for 2 days. Then take 2 pills for 2 days. Then, take 1 pill for 2 days. -     azithromycin 250 MG Oral Tab; Take two tablets by mouth today, then one daily.

## 2018-11-09 ENCOUNTER — NURSE ONLY (OUTPATIENT)
Dept: INTERNAL MEDICINE CLINIC | Facility: CLINIC | Age: 68
End: 2018-11-09
Payer: MEDICARE

## 2018-11-09 VITALS — DIASTOLIC BLOOD PRESSURE: 72 MMHG | HEART RATE: 62 BPM | SYSTOLIC BLOOD PRESSURE: 143 MMHG

## 2018-11-09 DIAGNOSIS — I10 ESSENTIAL HYPERTENSION: Primary | ICD-10-CM

## 2018-11-09 PROCEDURE — G0463 HOSPITAL OUTPT CLINIC VISIT: HCPCS | Performed by: INTERNAL MEDICINE

## 2018-11-09 NOTE — PROGRESS NOTES
Pt presents for B/P check . Pt took B/P meds at 8:45 am , had Pt sit in the room for 5 mins prior to checking B/P . B/P reading was 143/72 and Pulse 62 . Discuss w/Dr Vikas Sanchez who stated B/P was improved and continue monitoring .  Routed visit to Dr Vikas Sanchez

## 2018-11-20 RX ORDER — PIOGLITAZONEHYDROCHLORIDE 30 MG/1
TABLET ORAL
Qty: 30 TABLET | Refills: 5 | Status: SHIPPED | OUTPATIENT
Start: 2018-11-20 | End: 2019-03-15

## 2018-11-21 NOTE — TELEPHONE ENCOUNTER
Please review; protocol failed.   Diabetes Medications  Protocol Criteria:  · Appointment scheduled in the past 6 months or the next 3 months  · A1C < 7.5 in the past 6 months  · Creatinine in the past 12 months  · Creatinine result < 1.5   Recent Outpatien

## 2018-11-28 ENCOUNTER — TELEPHONE (OUTPATIENT)
Dept: ENDOCRINOLOGY CLINIC | Facility: CLINIC | Age: 68
End: 2018-11-28

## 2018-11-28 NOTE — TELEPHONE ENCOUNTER
LOV 3/2018. RTC in 6 months. Called patient to attempt to book follow up. LM. Requesting 90 day but pended one month refill.

## 2018-12-04 ENCOUNTER — APPOINTMENT (OUTPATIENT)
Dept: LAB | Facility: HOSPITAL | Age: 68
End: 2018-12-04
Attending: INTERNAL MEDICINE
Payer: MEDICARE

## 2018-12-04 DIAGNOSIS — E78.5 DYSLIPIDEMIA: ICD-10-CM

## 2018-12-04 PROCEDURE — 80061 LIPID PANEL: CPT

## 2018-12-04 PROCEDURE — 36415 COLL VENOUS BLD VENIPUNCTURE: CPT

## 2018-12-21 ENCOUNTER — OFFICE VISIT (OUTPATIENT)
Dept: ENDOCRINOLOGY CLINIC | Facility: CLINIC | Age: 68
End: 2018-12-21
Payer: MEDICARE

## 2018-12-21 VITALS — HEIGHT: 68 IN | BODY MASS INDEX: 30.46 KG/M2 | WEIGHT: 201 LBS

## 2018-12-21 DIAGNOSIS — E78.5 DYSLIPIDEMIA: ICD-10-CM

## 2018-12-21 DIAGNOSIS — E11.8 CONTROLLED TYPE 2 DIABETES MELLITUS WITH COMPLICATION, WITHOUT LONG-TERM CURRENT USE OF INSULIN (HCC): Primary | ICD-10-CM

## 2018-12-21 PROCEDURE — 99213 OFFICE O/P EST LOW 20 MIN: CPT | Performed by: INTERNAL MEDICINE

## 2018-12-21 PROCEDURE — 36416 COLLJ CAPILLARY BLOOD SPEC: CPT | Performed by: INTERNAL MEDICINE

## 2018-12-21 PROCEDURE — G0463 HOSPITAL OUTPT CLINIC VISIT: HCPCS | Performed by: INTERNAL MEDICINE

## 2018-12-21 PROCEDURE — 83036 HEMOGLOBIN GLYCOSYLATED A1C: CPT | Performed by: INTERNAL MEDICINE

## 2018-12-21 PROCEDURE — 82962 GLUCOSE BLOOD TEST: CPT | Performed by: INTERNAL MEDICINE

## 2018-12-21 NOTE — PROGRESS NOTES
Return Office Visit     CHIEF COMPLAINT:    DM    Dyslipidemia    HISTORY OF PRESENT ILLNESS:  Debra Tanner is a 77year old male who presents for follow up for for DM.      DM HISTORY:  Diagnosed in 2000       HISTORY OF DIABETES COMPLICATIONS: :  H INSTILL 1 DROP INTO BOTH EYES EVERY EVEING Disp:  Rfl: 3         ALLERGY:    Tetanus Immune Glob*        Comment:Other reaction(s): Other (See Comments)             Allergy to horse hair/serum.     PAST MEDICAL, SOCIAL AND FAMILY HISTORY:  See past medical --> 6.4 % ( 9/2017) --> 6.9 % ( 3/2018) --> 7.4 % ( 12/2018)    ASSESSMENT AND PLAN:    1. Type 2 DM:       Plan:  Discussed the pathogenesis, natural course of diabetes.  Patient understands the importance of glycemic control and the implications of uncont

## 2019-01-07 RX ORDER — ATENOLOL 25 MG/1
25 TABLET ORAL
Qty: 90 TABLET | Refills: 0 | Status: SHIPPED | OUTPATIENT
Start: 2019-01-07 | End: 2019-03-07

## 2019-01-07 NOTE — TELEPHONE ENCOUNTER
90 DAY REFILL REQUEST ON THE FOLLOWING. Current Outpatient Medications:                          ATENOLOL 25 MG Oral Tab TAKE 1 TABLET (25 MG TOTAL) BY MOUTH ONCE DAILY.  Disp: 90 tablet Rfl: 0

## 2019-01-09 RX ORDER — SIMVASTATIN 40 MG
TABLET ORAL
Qty: 90 TABLET | Refills: 1 | Status: SHIPPED | OUTPATIENT
Start: 2019-01-09 | End: 2019-10-08

## 2019-01-14 RX ORDER — LISINOPRIL 40 MG/1
40 TABLET ORAL DAILY
Qty: 90 TABLET | Refills: 0 | Status: SHIPPED | OUTPATIENT
Start: 2019-01-14 | End: 2019-04-16

## 2019-01-14 NOTE — TELEPHONE ENCOUNTER
Current Outpatient Medications:  lisinopril 40 MG Oral Tab Take 1 tablet (40 mg total) by mouth daily.  Disp: 90 tablet Rfl: 3

## 2019-01-14 NOTE — TELEPHONE ENCOUNTER
Current Outpatient Medications:  OMEGA-3-ACID ETHYL ESTERS 1 g Oral Cap TAKE ONE CAPSULE BY MOUTH TWICE A DAY Disp: 180 capsule Rfl: 0

## 2019-01-15 ENCOUNTER — OFFICE VISIT (OUTPATIENT)
Dept: OTOLARYNGOLOGY | Facility: CLINIC | Age: 69
End: 2019-01-15
Payer: MEDICARE

## 2019-01-15 VITALS
TEMPERATURE: 98 F | HEIGHT: 68 IN | WEIGHT: 201 LBS | SYSTOLIC BLOOD PRESSURE: 173 MMHG | BODY MASS INDEX: 30.46 KG/M2 | DIASTOLIC BLOOD PRESSURE: 85 MMHG

## 2019-01-15 DIAGNOSIS — H61.23 BILATERAL IMPACTED CERUMEN: Primary | ICD-10-CM

## 2019-01-15 PROCEDURE — 69210 REMOVE IMPACTED EAR WAX UNI: CPT | Performed by: OTOLARYNGOLOGY

## 2019-01-15 NOTE — PROGRESS NOTES
Brendan Li is a 77year old male. Patient presents with:  Ear Wax: bilateral ear cleaning      HISTORY OF PRESENT ILLNESS  He presents with a history of ear pain and discomfort about 4 or 5 days ago.  He was seen in immediate care and started on A Years of education: Not on file      Highest education level: Not on file    Tobacco Use      Smoking status: Never Smoker      Smokeless tobacco: Never Used    Substance and Sexual Activity      Alcohol use: Yes        Comment: socially, once a month, if Left: Normal.   Skin Normal Inspection - Normal.                              Canals:  Right: Canal reveals cerumen impaction,   Left: Canal reveals cerumen impaction,     Tympanic Membranes:  Right: Normal tympanic membrane.    Left: Normal tympanic Lorelei Dibbles asked the patient to return to see me as needed for repeat cerumen removal in the future. Alex Brothers.  Dallin Daley MD    1/15/2019    11:02 AM

## 2019-01-16 RX ORDER — OMEGA-3-ACID ETHYL ESTERS 1 G/1
CAPSULE, LIQUID FILLED ORAL
Qty: 180 CAPSULE | Refills: 0 | Status: SHIPPED | OUTPATIENT
Start: 2019-01-16 | End: 2019-04-12

## 2019-01-21 ENCOUNTER — HOSPITAL ENCOUNTER (OUTPATIENT)
Dept: GENERAL RADIOLOGY | Facility: HOSPITAL | Age: 69
Discharge: HOME | End: 2019-01-21
Attending: ORTHOPAEDIC SURGERY | Admitting: ORTHOPAEDIC SURGERY
Payer: MEDICARE

## 2019-01-21 ENCOUNTER — HOSPITAL ENCOUNTER (OUTPATIENT)
Dept: GENERAL RADIOLOGY | Facility: HOSPITAL | Age: 69
Discharge: HOME | End: 2019-01-21
Attending: ORTHOPAEDIC SURGERY
Payer: MEDICARE

## 2019-01-21 ENCOUNTER — OFFICE VISIT (OUTPATIENT)
Dept: ORTHOPEDICS CLINIC | Facility: CLINIC | Age: 69
End: 2019-01-21
Payer: MEDICARE

## 2019-01-21 DIAGNOSIS — S46.911A STRAIN OF RIGHT SHOULDER, INITIAL ENCOUNTER: ICD-10-CM

## 2019-01-21 DIAGNOSIS — M47.812 SPONDYLOSIS OF CERVICAL REGION WITHOUT MYELOPATHY OR RADICULOPATHY: ICD-10-CM

## 2019-01-21 DIAGNOSIS — S46.912A STRAIN OF LEFT SHOULDER, INITIAL ENCOUNTER: ICD-10-CM

## 2019-01-21 DIAGNOSIS — R52 PAIN: ICD-10-CM

## 2019-01-21 DIAGNOSIS — R52 PAIN: Primary | ICD-10-CM

## 2019-01-21 PROCEDURE — 73030 X-RAY EXAM OF SHOULDER: CPT | Performed by: ORTHOPAEDIC SURGERY

## 2019-01-21 PROCEDURE — G0463 HOSPITAL OUTPT CLINIC VISIT: HCPCS | Performed by: ORTHOPAEDIC SURGERY

## 2019-01-21 PROCEDURE — 72040 X-RAY EXAM NECK SPINE 2-3 VW: CPT | Performed by: ORTHOPAEDIC SURGERY

## 2019-01-21 PROCEDURE — 99213 OFFICE O/P EST LOW 20 MIN: CPT | Performed by: ORTHOPAEDIC SURGERY

## 2019-01-21 RX ORDER — MELOXICAM 15 MG/1
15 TABLET ORAL DAILY
Qty: 30 TABLET | Refills: 1 | Status: SHIPPED | OUTPATIENT
Start: 2019-01-21 | End: 2019-06-07

## 2019-01-21 NOTE — PROGRESS NOTES
Time based billing: total face-to-face time spent examining, counseling and treating this patient 15 minutes; more than 50% of time spent in counseling/coordination of care    Patient presents for evaluation of his bilateral shoulder pain and neck pain.   I

## 2019-03-07 ENCOUNTER — OFFICE VISIT (OUTPATIENT)
Dept: INTERNAL MEDICINE CLINIC | Facility: CLINIC | Age: 69
End: 2019-03-07
Payer: MEDICARE

## 2019-03-07 VITALS
BODY MASS INDEX: 30.49 KG/M2 | HEART RATE: 60 BPM | WEIGHT: 201.19 LBS | DIASTOLIC BLOOD PRESSURE: 78 MMHG | TEMPERATURE: 98 F | RESPIRATION RATE: 20 BRPM | SYSTOLIC BLOOD PRESSURE: 156 MMHG | HEIGHT: 68 IN

## 2019-03-07 DIAGNOSIS — Z12.5 SCREENING FOR PROSTATE CANCER: ICD-10-CM

## 2019-03-07 DIAGNOSIS — E11.9 TYPE 2 DIABETES MELLITUS WITHOUT COMPLICATION, WITHOUT LONG-TERM CURRENT USE OF INSULIN (HCC): Primary | ICD-10-CM

## 2019-03-07 DIAGNOSIS — I10 ESSENTIAL HYPERTENSION: ICD-10-CM

## 2019-03-07 PROCEDURE — G0463 HOSPITAL OUTPT CLINIC VISIT: HCPCS | Performed by: INTERNAL MEDICINE

## 2019-03-07 PROCEDURE — 99214 OFFICE O/P EST MOD 30 MIN: CPT | Performed by: INTERNAL MEDICINE

## 2019-03-07 RX ORDER — ALBUTEROL SULFATE 90 UG/1
AEROSOL, METERED RESPIRATORY (INHALATION)
COMMUNITY
Start: 2014-05-27 | End: 2019-03-07

## 2019-03-07 RX ORDER — ATENOLOL 50 MG/1
50 TABLET ORAL
Qty: 90 TABLET | Refills: 3 | Status: SHIPPED | OUTPATIENT
Start: 2019-03-07 | End: 2019-04-11 | Stop reason: ALTCHOICE

## 2019-03-07 RX ORDER — LATANOPROST 50 UG/ML
SOLUTION/ DROPS OPHTHALMIC
COMMUNITY
Start: 2011-06-17 | End: 2019-03-07

## 2019-03-07 NOTE — PROGRESS NOTES
HPI:    Patient ID: Harshal Asif is a 77year old male. HPI  Patient is here for follow-up of medical issues as below. Last seen here in September. Pressure was elevated at that time we we did not make any changes.   Since that time he is seen t dysuria, hematuria and sexual dysfunction. Musculoskeletal: Negative for joint pain. Skin: Negative for rash. Neurological: Negative for weakness, numbness and headaches. Hematological: Does not bruise/bleed easily.    Psychiatric/Behavioral: Burt Barba rhythm, normal heart sounds and intact distal pulses. Exam reveals no gallop and no friction rub. No murmur heard. Edema not present. Pulmonary/Chest: Effort normal and breath sounds normal. He has no wheezes. He has no rales. Abdominal: Soft.  [place]

## 2019-03-15 RX ORDER — PIOGLITAZONEHYDROCHLORIDE 30 MG/1
30 TABLET ORAL
Qty: 90 TABLET | Refills: 0 | Status: SHIPPED | OUTPATIENT
Start: 2019-03-15 | End: 2019-06-13

## 2019-03-15 NOTE — TELEPHONE ENCOUNTER
90 day supply    •  PIOGLITAZONE HCL 30 MG Oral Tab, TAKE 1 TABLET BY MOUTH EVERY DAY, Disp: 30 tablet, Rfl: 5

## 2019-03-15 NOTE — TELEPHONE ENCOUNTER
Refill passed per [facility] protocol.     Diabetes Medications  Protocol Criteria:  · Appointment scheduled in the past 6 months or the next 3 months  · A1C < 7.5 in the past 6 months  · Creatinine in the past 12 months  · Creatinine result < 1.5   Re supp -- NN    In 1 month Maryan Ramirez, PT [address] no c/p, medicare and supp -- NN    In 1 month Duncan Ramirez, PT [facility] no c/p, medicare and supp -- NN

## 2019-03-18 ENCOUNTER — OFFICE VISIT (OUTPATIENT)
Dept: PHYSICAL THERAPY | Age: 69
End: 2019-03-18
Attending: ORTHOPAEDIC SURGERY
Payer: MEDICARE

## 2019-03-18 DIAGNOSIS — S46.912A STRAIN OF LEFT SHOULDER, INITIAL ENCOUNTER: ICD-10-CM

## 2019-03-18 DIAGNOSIS — M47.812 SPONDYLOSIS OF CERVICAL REGION WITHOUT MYELOPATHY OR RADICULOPATHY: ICD-10-CM

## 2019-03-18 DIAGNOSIS — S46.911A STRAIN OF RIGHT SHOULDER, INITIAL ENCOUNTER: ICD-10-CM

## 2019-03-18 PROCEDURE — 97530 THERAPEUTIC ACTIVITIES: CPT

## 2019-03-18 PROCEDURE — 97110 THERAPEUTIC EXERCISES: CPT

## 2019-03-18 PROCEDURE — 97161 PT EVAL LOW COMPLEX 20 MIN: CPT

## 2019-03-18 NOTE — PROGRESS NOTES
PHYSICAL THERAPY EVALUATION:   Referring Physician: Dr. Bola Ludwig  Date of Onset: 1/21/2019 Date of Service: 3/18/2019   Diagnosis: Strain of right shoulder, initial encounter (V51.690A)  Strain of left shoulder, initial encounter (I59.232H)  Spondylos 50 no    Bowel or bladder Dysfunction/Saddle Anesthesia no    History of Cancer no    Immune Suppression no    History of Trauma no    Night Pain, Unexplained Weight Loss, Fever or Chills no Occasional L shoulder pain with sleep due to position   Lower Ext Extension 60    R Sidebend 28    L Sidebend 20    R Rotation 45    L Rotation 40    Protrusion Min loss    Retraction Mod/marked loss        Shoulder AROM: measured in degrees     3/18/2019      Comments   Flex R: 149, L: 160         Abd R parascpular strength at 5/5 to work on at least 2 pianos on one day with good endurance and without provocation of pain. Frequency / Duration: Patient will be seen for 2x/week or a total of 8 visits over a 90 day period.   Treatment will include: Sandra

## 2019-03-21 ENCOUNTER — OFFICE VISIT (OUTPATIENT)
Dept: PHYSICAL THERAPY | Age: 69
End: 2019-03-21
Attending: ORTHOPAEDIC SURGERY
Payer: MEDICARE

## 2019-03-21 DIAGNOSIS — S46.912A STRAIN OF LEFT SHOULDER, INITIAL ENCOUNTER: ICD-10-CM

## 2019-03-21 DIAGNOSIS — M47.812 SPONDYLOSIS OF CERVICAL REGION WITHOUT MYELOPATHY OR RADICULOPATHY: ICD-10-CM

## 2019-03-21 DIAGNOSIS — S46.911A STRAIN OF RIGHT SHOULDER, INITIAL ENCOUNTER: ICD-10-CM

## 2019-03-21 PROCEDURE — 97112 NEUROMUSCULAR REEDUCATION: CPT

## 2019-03-21 PROCEDURE — 97110 THERAPEUTIC EXERCISES: CPT

## 2019-03-21 PROCEDURE — 97140 MANUAL THERAPY 1/> REGIONS: CPT

## 2019-03-21 NOTE — PROGRESS NOTES
Dx:      Strain of right shoulder, initial encounter (K32.690N)  Strain of left shoulder, initial encounter (M65.663I)  Spondylosis of cervical region without myelopathy or radiculopathy (F82.278)    Authorized # of Visits:  8 (Medicare PPO;  Cert ends - 6/ community settings and for self management of prophylactic care. 2. Patient will have shoulder ROM wfl to ease ability to reach for items on shelves overhead without difficulty.   3. Patient will have shoulder and parascpular strength at 5/5 to work on at

## 2019-03-28 ENCOUNTER — OFFICE VISIT (OUTPATIENT)
Dept: PHYSICAL THERAPY | Age: 69
End: 2019-03-28
Attending: ORTHOPAEDIC SURGERY
Payer: MEDICARE

## 2019-03-28 DIAGNOSIS — S46.911A STRAIN OF RIGHT SHOULDER, INITIAL ENCOUNTER: ICD-10-CM

## 2019-03-28 DIAGNOSIS — S46.912A STRAIN OF LEFT SHOULDER, INITIAL ENCOUNTER: ICD-10-CM

## 2019-03-28 DIAGNOSIS — M47.812 SPONDYLOSIS OF CERVICAL REGION WITHOUT MYELOPATHY OR RADICULOPATHY: ICD-10-CM

## 2019-03-28 PROCEDURE — 97140 MANUAL THERAPY 1/> REGIONS: CPT

## 2019-03-28 PROCEDURE — 97110 THERAPEUTIC EXERCISES: CPT

## 2019-03-28 NOTE — PROGRESS NOTES
Dx:      Strain of right shoulder, initial encounter (N77.250I)  Strain of left shoulder, initial encounter (W89.063J)  Spondylosis of cervical region without myelopathy or radiculopathy (U49.493)    Authorized # of Visits:  8 (Medicare PPO;  Cert ends - 6/ Therapeutic Activity X 10 min  - pt ed: posture ed for neutral spine; correction of R cervical SB deviation; sitting with lumbar roll        Assessment: Midcervical segments are stiff with L sidebending and R sideglides more restricted compared to R with

## 2019-04-08 RX ORDER — ATENOLOL 25 MG/1
TABLET ORAL
Qty: 90 TABLET | Refills: 0 | OUTPATIENT
Start: 2019-04-08

## 2019-04-09 ENCOUNTER — OFFICE VISIT (OUTPATIENT)
Dept: PHYSICAL THERAPY | Age: 69
End: 2019-04-09
Attending: ORTHOPAEDIC SURGERY
Payer: MEDICARE

## 2019-04-09 DIAGNOSIS — S46.911A STRAIN OF RIGHT SHOULDER, INITIAL ENCOUNTER: ICD-10-CM

## 2019-04-09 DIAGNOSIS — M47.812 SPONDYLOSIS OF CERVICAL REGION WITHOUT MYELOPATHY OR RADICULOPATHY: ICD-10-CM

## 2019-04-09 DIAGNOSIS — S46.912A STRAIN OF LEFT SHOULDER, INITIAL ENCOUNTER: ICD-10-CM

## 2019-04-09 PROCEDURE — 97110 THERAPEUTIC EXERCISES: CPT

## 2019-04-09 PROCEDURE — 97140 MANUAL THERAPY 1/> REGIONS: CPT

## 2019-04-09 NOTE — PROGRESS NOTES
Dx:      Strain of right shoulder, initial encounter (D73.912U)  Strain of left shoulder, initial encounter (Z24.140K)  Spondylosis of cervical region without myelopathy or radiculopathy (C16.360)    Authorized # of Visits:  8 (Medicare PPO;  Cert ends - 6/ flex: 10x3  - standing: scap retraction green tband: 10x2  - standing: shoulder ext green tband; 10x2  - standing: green tband wide rows: 10x2  - shoulder ER green tband; 10x2  - shoulder IR: green tband; 10x2   Manual Therapy - sidelying: scapulothoracic

## 2019-04-11 ENCOUNTER — OFFICE VISIT (OUTPATIENT)
Dept: INTERNAL MEDICINE CLINIC | Facility: CLINIC | Age: 69
End: 2019-04-11
Payer: MEDICARE

## 2019-04-11 VITALS
HEART RATE: 62 BPM | BODY MASS INDEX: 30.48 KG/M2 | HEIGHT: 68 IN | SYSTOLIC BLOOD PRESSURE: 160 MMHG | DIASTOLIC BLOOD PRESSURE: 70 MMHG | WEIGHT: 201.13 LBS

## 2019-04-11 DIAGNOSIS — I10 ESSENTIAL HYPERTENSION: Primary | ICD-10-CM

## 2019-04-11 PROCEDURE — 99213 OFFICE O/P EST LOW 20 MIN: CPT | Performed by: PHYSICIAN ASSISTANT

## 2019-04-11 PROCEDURE — G0463 HOSPITAL OUTPT CLINIC VISIT: HCPCS | Performed by: PHYSICIAN ASSISTANT

## 2019-04-11 RX ORDER — AMLODIPINE BESYLATE 5 MG/1
5 TABLET ORAL DAILY
Qty: 30 TABLET | Refills: 5 | Status: SHIPPED | OUTPATIENT
Start: 2019-04-11 | End: 2019-08-27

## 2019-04-11 NOTE — PROGRESS NOTES
Suzanna Fry is a 77year old male. Patient presents with:  Hypertension      HPI:   Patient presents today for follow up of hypertension. Was last seen in the office 1 month ago by PCP.  At that time atenolol was increased from 25 mg to 50 mg once zoster) 2016    Rx with prednisone      No past surgical history on file.    Social History:  Social History    Tobacco Use      Smoking status: Never Smoker      Smokeless tobacco: Never Used    Alcohol use: Yes      Comment: socially, once a month, if destin

## 2019-04-11 NOTE — PATIENT INSTRUCTIONS
Decrease Atenolol from 50 mg to 25 mg daily for the next few days, then stop altogether. Start Amlodipine 5 mg once daily at that time. Follow up in the office in one month.

## 2019-04-12 ENCOUNTER — OFFICE VISIT (OUTPATIENT)
Dept: PHYSICAL THERAPY | Age: 69
End: 2019-04-12
Attending: ORTHOPAEDIC SURGERY
Payer: MEDICARE

## 2019-04-12 DIAGNOSIS — S46.911A STRAIN OF RIGHT SHOULDER, INITIAL ENCOUNTER: ICD-10-CM

## 2019-04-12 DIAGNOSIS — S46.912A STRAIN OF LEFT SHOULDER, INITIAL ENCOUNTER: ICD-10-CM

## 2019-04-12 DIAGNOSIS — M47.812 SPONDYLOSIS OF CERVICAL REGION WITHOUT MYELOPATHY OR RADICULOPATHY: ICD-10-CM

## 2019-04-12 PROCEDURE — 97110 THERAPEUTIC EXERCISES: CPT

## 2019-04-12 NOTE — PROGRESS NOTES
Dx:      Strain of right shoulder, initial encounter (R00.087L)  Strain of left shoulder, initial encounter (I45.134I)  Spondylosis of cervical region without myelopathy or radiculopathy (C11.394)    Authorized # of Visits:  8 (Medicare PPO;  Cert ends - 6/ bridging; 10x2  - supine: bridge with hip add ball and chest pull at blue tband; 10x2  - sitting: repeated cervical retraction with extension; 10x1  - wall push ups; 10x2  - wall slides shoulder flex: 10x3  - standing: scap retraction green tband: 10x2  -

## 2019-04-14 RX ORDER — OMEGA-3-ACID ETHYL ESTERS 1 G/1
CAPSULE, LIQUID FILLED ORAL
Qty: 180 CAPSULE | Refills: 1 | Status: SHIPPED | OUTPATIENT
Start: 2019-04-14 | End: 2019-11-22

## 2019-04-14 NOTE — TELEPHONE ENCOUNTER
Failed protocol no recent AST. Please call patient and remind him to get labs done. [employer] message sent to patient to remind him to get labs done.      Cholesterol Medications  Protocol Criteria:  · Appointment scheduled in the past 12 months or in the

## 2019-04-15 NOTE — TELEPHONE ENCOUNTER
Refill request form [facility] for:    Atenolol Tab 25mg QTY: 90 Take 1 tablet by mouth every day

## 2019-04-16 ENCOUNTER — APPOINTMENT (OUTPATIENT)
Dept: PHYSICAL THERAPY | Age: 69
End: 2019-04-16
Attending: ORTHOPAEDIC SURGERY
Payer: MEDICARE

## 2019-04-17 ENCOUNTER — TELEPHONE (OUTPATIENT)
Dept: INTERNAL MEDICINE CLINIC | Facility: CLINIC | Age: 69
End: 2019-04-17

## 2019-04-17 RX ORDER — LISINOPRIL 40 MG/1
TABLET ORAL
Qty: 90 TABLET | Refills: 1 | Status: SHIPPED | OUTPATIENT
Start: 2019-04-17 | End: 2019-04-18

## 2019-04-17 RX ORDER — ATENOLOL 50 MG/1
50 TABLET ORAL
Qty: 90 TABLET | Refills: 3 | OUTPATIENT
Start: 2019-04-17

## 2019-04-17 NOTE — TELEPHONE ENCOUNTER
Spoke to patient regarding amlodipine 5 mg which was recently prescribed. Patient has concerns that he may develop cramping in the legs or lower extremity swelling. 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 He requests to hold off on starting the medication until this is completed which seems reasonable. He will continue on atenolol a few more days and contact the office of blood pressures are elevated.

## 2019-04-17 NOTE — TELEPHONE ENCOUNTER
Spoke to patient earlier today. He is concerned about the potential for lower extremity swelling with amlodipine.   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

## 2019-04-17 NOTE — TELEPHONE ENCOUNTER
Please review; protocol failed.   Requested Prescriptions     Pending Prescriptions Disp Refills   • LISINOPRIL 40 MG Oral Tab [Pharmacy Med Name: LISINOPRIL 40 MG TABLET] 90 tablet 0     Sig: TAKE 1 TABLET BY MOUTH EVERY DAY         Recent Visits  Date Typ

## 2019-04-17 NOTE — TELEPHONE ENCOUNTER
Pt is returning the call. He wants to clarify when to stop the Atenolol and when to start Amlodipine. LR, Please advise.

## 2019-04-17 NOTE — TELEPHONE ENCOUNTER
LMTCB, pt was to wean off of this, need to confirm with patient, see notes pasted below from visit with Joe MIRANDA on 4/11/19    ASSESSMENT AND PLAN:   Diagnoses and all orders for this visit:     Essential hypertension  Blood pressure has not improv

## 2019-04-18 ENCOUNTER — OFFICE VISIT (OUTPATIENT)
Dept: PHYSICAL THERAPY | Age: 69
End: 2019-04-18
Attending: ORTHOPAEDIC SURGERY
Payer: MEDICARE

## 2019-04-18 DIAGNOSIS — S46.911A STRAIN OF RIGHT SHOULDER, INITIAL ENCOUNTER: ICD-10-CM

## 2019-04-18 DIAGNOSIS — M47.812 SPONDYLOSIS OF CERVICAL REGION WITHOUT MYELOPATHY OR RADICULOPATHY: ICD-10-CM

## 2019-04-18 DIAGNOSIS — S46.912A STRAIN OF LEFT SHOULDER, INITIAL ENCOUNTER: ICD-10-CM

## 2019-04-18 PROCEDURE — 97110 THERAPEUTIC EXERCISES: CPT

## 2019-04-18 PROCEDURE — 97140 MANUAL THERAPY 1/> REGIONS: CPT

## 2019-04-18 NOTE — PROGRESS NOTES
Dx:      Strain of right shoulder, initial encounter (S73.501L)  Strain of left shoulder, initial encounter (J11.844S)  Spondylosis of cervical region without myelopathy or radiculopathy (P74.546)    Authorized # of Visits:  8 (Medicare PPO;  Cert ends - 6/ no change - added forward punch and B ER with band   Therapeutic Exercise - Supine: shoulder flex cane; 10x2  - Supine: serratus rope climb; 10x2  - Supine: reverse flies; 10x2  - seated: cervical retraction: 10x2  - wall slides shoulder flex: 10x3  - bernabe Patient will be independent with HEP to optimize gains made in PT to home and community settings and for self management of prophylactic care. (MET)  2.  Patient will have shoulder ROM wfl to ease ability to reach for items on shelves overhead without diffi

## 2019-04-21 RX ORDER — LISINOPRIL 40 MG/1
TABLET ORAL
Qty: 90 TABLET | Refills: 1 | Status: SHIPPED | OUTPATIENT
Start: 2019-04-21 | End: 2019-04-26

## 2019-04-21 NOTE — TELEPHONE ENCOUNTER
Failed protocol no recent labs. Has follow up in 1 month.    Hypertensive Medications  Protocol Criteria:  · Appointment scheduled in the past 6 months or in the next 3 months  · BMP or CMP in the past 12 months  · Creatinine result < 2  Recent Outpatient V

## 2019-04-23 ENCOUNTER — APPOINTMENT (OUTPATIENT)
Dept: PHYSICAL THERAPY | Age: 69
End: 2019-04-23
Attending: ORTHOPAEDIC SURGERY
Payer: MEDICARE

## 2019-04-24 ENCOUNTER — LAB ENCOUNTER (OUTPATIENT)
Dept: LAB | Facility: HOSPITAL | Age: 69
End: 2019-04-24
Attending: INTERNAL MEDICINE
Payer: MEDICARE

## 2019-04-24 DIAGNOSIS — Z12.5 SCREENING FOR PROSTATE CANCER: ICD-10-CM

## 2019-04-24 DIAGNOSIS — E11.9 TYPE 2 DIABETES MELLITUS WITHOUT COMPLICATION, WITHOUT LONG-TERM CURRENT USE OF INSULIN (HCC): ICD-10-CM

## 2019-04-24 PROCEDURE — 82043 UR ALBUMIN QUANTITATIVE: CPT

## 2019-04-24 PROCEDURE — 82570 ASSAY OF URINE CREATININE: CPT

## 2019-04-24 PROCEDURE — 84443 ASSAY THYROID STIM HORMONE: CPT

## 2019-04-24 PROCEDURE — 36415 COLL VENOUS BLD VENIPUNCTURE: CPT

## 2019-04-24 PROCEDURE — 80053 COMPREHEN METABOLIC PANEL: CPT

## 2019-04-24 PROCEDURE — 84439 ASSAY OF FREE THYROXINE: CPT

## 2019-04-24 PROCEDURE — 80061 LIPID PANEL: CPT

## 2019-04-24 PROCEDURE — 85025 COMPLETE CBC W/AUTO DIFF WBC: CPT

## 2019-04-24 PROCEDURE — 83036 HEMOGLOBIN GLYCOSYLATED A1C: CPT

## 2019-04-25 ENCOUNTER — APPOINTMENT (OUTPATIENT)
Dept: PHYSICAL THERAPY | Age: 69
End: 2019-04-25
Attending: ORTHOPAEDIC SURGERY
Payer: MEDICARE

## 2019-04-26 ENCOUNTER — OFFICE VISIT (OUTPATIENT)
Dept: ENDOCRINOLOGY CLINIC | Facility: CLINIC | Age: 69
End: 2019-04-26
Payer: MEDICARE

## 2019-04-26 VITALS
SYSTOLIC BLOOD PRESSURE: 158 MMHG | DIASTOLIC BLOOD PRESSURE: 81 MMHG | WEIGHT: 202.81 LBS | BODY MASS INDEX: 31 KG/M2 | HEART RATE: 72 BPM

## 2019-04-26 DIAGNOSIS — E78.5 DYSLIPIDEMIA: ICD-10-CM

## 2019-04-26 DIAGNOSIS — E11.8 CONTROLLED TYPE 2 DIABETES MELLITUS WITH COMPLICATION, WITHOUT LONG-TERM CURRENT USE OF INSULIN (HCC): Primary | ICD-10-CM

## 2019-04-26 DIAGNOSIS — R79.89 HIGH SERUM THYROID STIMULATING HORMONE (TSH): ICD-10-CM

## 2019-04-26 PROCEDURE — 36416 COLLJ CAPILLARY BLOOD SPEC: CPT | Performed by: INTERNAL MEDICINE

## 2019-04-26 PROCEDURE — 82962 GLUCOSE BLOOD TEST: CPT | Performed by: INTERNAL MEDICINE

## 2019-04-26 PROCEDURE — 99213 OFFICE O/P EST LOW 20 MIN: CPT | Performed by: INTERNAL MEDICINE

## 2019-04-26 RX ORDER — LISINOPRIL 40 MG/1
40 TABLET ORAL
Qty: 90 TABLET | Refills: 0 | Status: SHIPPED | OUTPATIENT
Start: 2019-04-26 | End: 2019-07-25

## 2019-04-26 NOTE — PROGRESS NOTES
Return Office Visit     CHIEF COMPLAINT:    DM    Dyslipidemia    HISTORY OF PRESENT ILLNESS:  Miguelangel Garcia is a 77year old male who presents for follow up for for DM.      DM HISTORY:  Diagnosed in 2000       HISTORY OF DIABETES COMPLICATIONS: :  H apply Misc USE LANCETS TO CHECK BLOOD SUGAR UP TO 4 TIMES PER DAY Disp: 100 each Rfl: 2   Glucose Blood (FREESTYLE LITE TEST) In Vitro Strip Check blood sugar up to (4) four times a day.  E11.9 diagnosis code Disp: 100 strip Rfl: 3   latanoprost 0.005 % Oph crackles or wheezing  CARDIOVASCULAR:  regular rate and rhythm, normal S1 and S2  ABDOMEN:  normal bowel sounds, soft, non-distended, non-tender  SKIN:  no bruising or bleeding, no rashes and no lesions  EXTREMITIES: normal pulses, no edema      DATA:

## 2019-05-01 ENCOUNTER — APPOINTMENT (OUTPATIENT)
Dept: PHYSICAL THERAPY | Age: 69
End: 2019-05-01
Attending: ORTHOPAEDIC SURGERY
Payer: MEDICARE

## 2019-05-11 ENCOUNTER — HOSPITAL ENCOUNTER (OUTPATIENT)
Age: 69
Discharge: HOME | End: 2019-05-11
Attending: EMERGENCY MEDICINE
Payer: MEDICARE

## 2019-05-11 VITALS
HEIGHT: 68 IN | WEIGHT: 201 LBS | SYSTOLIC BLOOD PRESSURE: 167 MMHG | RESPIRATION RATE: 18 BRPM | DIASTOLIC BLOOD PRESSURE: 92 MMHG | BODY MASS INDEX: 30.46 KG/M2 | OXYGEN SATURATION: 98 % | HEART RATE: 88 BPM | TEMPERATURE: 97 F

## 2019-05-11 DIAGNOSIS — R31.9 HEMATURIA, UNSPECIFIED TYPE: Primary | ICD-10-CM

## 2019-05-11 PROCEDURE — 99214 OFFICE O/P EST MOD 30 MIN: CPT

## 2019-05-11 PROCEDURE — 87086 URINE CULTURE/COLONY COUNT: CPT | Performed by: EMERGENCY MEDICINE

## 2019-05-11 PROCEDURE — 81003 URINALYSIS AUTO W/O SCOPE: CPT

## 2019-05-11 RX ORDER — CEPHALEXIN 500 MG/1
500 CAPSULE ORAL 3 TIMES DAILY
Qty: 15 CAPSULE | Refills: 0 | Status: SHIPPED | OUTPATIENT
Start: 2019-05-11 | End: 2019-05-30 | Stop reason: ALTCHOICE

## 2019-05-11 NOTE — ED PROVIDER NOTES
Patient Seen in: [facility] In [address] [place] [address]    History   Patient presents with:  Urinary Symptoms (urologic)    Stated Complaint: blood in urine     HPI    The patient is a 53-year-old male with past history of hypertension, diabetes, k injection  ENT: TMs are clear and flat bilaterally.   There is no posterior pharyngeal erythema  Chest: Clear to auscultation, no tenderness  Cardiovascular: Regular rate and rhythm without murmur  Abdomen: Soft, nontender and nondistended  Neurologic: Rena

## 2019-05-15 ENCOUNTER — OFFICE VISIT (OUTPATIENT)
Dept: SURGERY | Facility: CLINIC | Age: 69
End: 2019-05-15
Payer: MEDICARE

## 2019-05-15 VITALS
WEIGHT: 201 LBS | HEIGHT: 68 IN | BODY MASS INDEX: 30.46 KG/M2 | DIASTOLIC BLOOD PRESSURE: 80 MMHG | SYSTOLIC BLOOD PRESSURE: 145 MMHG | HEART RATE: 97 BPM

## 2019-05-15 DIAGNOSIS — R35.0 URINARY FREQUENCY: ICD-10-CM

## 2019-05-15 DIAGNOSIS — N40.1 BENIGN PROSTATIC HYPERPLASIA WITH LOWER URINARY TRACT SYMPTOMS, SYMPTOM DETAILS UNSPECIFIED: ICD-10-CM

## 2019-05-15 DIAGNOSIS — R31.0 GROSS HEMATURIA: Primary | ICD-10-CM

## 2019-05-15 DIAGNOSIS — R35.1 NOCTURIA: ICD-10-CM

## 2019-05-15 DIAGNOSIS — Z87.442 HISTORY OF KIDNEY STONES: ICD-10-CM

## 2019-05-15 PROCEDURE — 99213 OFFICE O/P EST LOW 20 MIN: CPT | Performed by: NURSE PRACTITIONER

## 2019-05-15 PROCEDURE — G0463 HOSPITAL OUTPT CLINIC VISIT: HCPCS | Performed by: NURSE PRACTITIONER

## 2019-05-15 NOTE — PROGRESS NOTES
HPI:    Patient ID: Manan Webb is a 77year old male. HPI     Patient is a 77year old male who presents to the clinic for a follow up regarding hematuria. Past medical history of DM, HL, and kidney stones.   Previous patient of Dr. Fracisco Arellano last Medications:  cephALEXin 500 MG Oral Cap Take 1 capsule (500 mg total) by mouth 3 (three) times daily.  Disp: 15 capsule Rfl: 0   METFORMIN  MG Oral Tab TAKE 2 TABLETS BY MOUTH TWICE A DAY Disp: 120 tablet Rfl: 0   lisinopril 40 MG Oral Tab Take 1 ta on file.    Family History   Problem Relation Age of Onset   • Other (Other) Father          of stroke   • Other (Other) Mother         Alzheimer's Disease   • Diabetes Sister       Social History: Social History    Tobacco Use      Smoking status: Yarelis Robison for cystoscopy with Dr. Dorsey Penjuanis. Will send urine today for UA and urine cytology. I will notify him regarding results.     Cc. Dr. Parker Cheema        Orders Placed This Encounter      Urinalysis, Routine      Cytology, fluids      Meds This Visit:

## 2019-05-22 ENCOUNTER — HOSPITAL ENCOUNTER (OUTPATIENT)
Dept: CT IMAGING | Facility: HOSPITAL | Age: 69
Discharge: HOME | End: 2019-05-22
Attending: NURSE PRACTITIONER
Payer: MEDICARE

## 2019-05-22 DIAGNOSIS — R31.0 GROSS HEMATURIA: ICD-10-CM

## 2019-05-22 PROCEDURE — 74178 CT ABD&PLV WO CNTR FLWD CNTR: CPT | Performed by: NURSE PRACTITIONER

## 2019-05-30 ENCOUNTER — OFFICE VISIT (OUTPATIENT)
Dept: INTERNAL MEDICINE CLINIC | Facility: CLINIC | Age: 69
End: 2019-05-30
Payer: MEDICARE

## 2019-05-30 VITALS
RESPIRATION RATE: 20 BRPM | TEMPERATURE: 98 F | HEART RATE: 100 BPM | DIASTOLIC BLOOD PRESSURE: 72 MMHG | BODY MASS INDEX: 30.41 KG/M2 | HEIGHT: 68 IN | SYSTOLIC BLOOD PRESSURE: 152 MMHG | WEIGHT: 200.63 LBS

## 2019-05-30 DIAGNOSIS — E78.5 HYPERLIPIDEMIA, UNSPECIFIED HYPERLIPIDEMIA TYPE: ICD-10-CM

## 2019-05-30 DIAGNOSIS — I10 ESSENTIAL HYPERTENSION: Primary | ICD-10-CM

## 2019-05-30 DIAGNOSIS — E11.9 TYPE 2 DIABETES MELLITUS WITHOUT COMPLICATION, WITHOUT LONG-TERM CURRENT USE OF INSULIN (HCC): ICD-10-CM

## 2019-05-30 PROCEDURE — 99214 OFFICE O/P EST MOD 30 MIN: CPT | Performed by: INTERNAL MEDICINE

## 2019-05-30 PROCEDURE — G0463 HOSPITAL OUTPT CLINIC VISIT: HCPCS | Performed by: INTERNAL MEDICINE

## 2019-05-30 RX ORDER — ATENOLOL 50 MG/1
50 TABLET ORAL DAILY
Qty: 90 TABLET | Refills: 1 | Status: SHIPPED | OUTPATIENT
Start: 2019-05-30 | End: 2020-02-17

## 2019-05-30 NOTE — PROGRESS NOTES
HPI:    Patient ID: Manan Webb is a 77year old male. HPI  Patient is here for follow-up on chronic medical issues as listed below. Last seen here in March. At that time we increased atenolol 25 up to 50 mg a day.   Since that time he saw the if that    Drug use: No           Review of Systems   Constitutional: Negative for chills, fatigue and fever. HENT: Negative for hearing loss. Eyes: Negative for visual disturbance. Respiratory: Negative for cough and shortness of breath.     Cardiov 100 strip Rfl: 3   latanoprost 0.005 % Ophthalmic Solution INSTILL 1 DROP INTO BOTH EYES EVERY EVEING Disp:  Rfl: 3     Allergies:  Tetanus Immune Glob*        Comment:Other reaction(s): Other (See Comments)             Allergy to horse hair/serum.    PHYSI requested or ordered in this encounter       Imaging & Referrals:  None         ID#[id number]

## 2019-05-31 ENCOUNTER — TELEPHONE (OUTPATIENT)
Dept: SURGERY | Facility: CLINIC | Age: 69
End: 2019-05-31

## 2019-05-31 NOTE — TELEPHONE ENCOUNTER
Called patient spoke to him explained to him the procedure states he feels more at ease, but still very nervous

## 2019-06-07 ENCOUNTER — OFFICE VISIT (OUTPATIENT)
Dept: SURGERY | Facility: CLINIC | Age: 69
End: 2019-06-07
Payer: MEDICARE

## 2019-06-07 ENCOUNTER — TELEPHONE (OUTPATIENT)
Dept: SURGERY | Facility: CLINIC | Age: 69
End: 2019-06-07

## 2019-06-07 VITALS
BODY MASS INDEX: 30.46 KG/M2 | SYSTOLIC BLOOD PRESSURE: 160 MMHG | HEIGHT: 68 IN | DIASTOLIC BLOOD PRESSURE: 78 MMHG | RESPIRATION RATE: 16 BRPM | HEART RATE: 90 BPM | WEIGHT: 201 LBS | TEMPERATURE: 98 F

## 2019-06-07 DIAGNOSIS — R35.1 NOCTURIA: ICD-10-CM

## 2019-06-07 DIAGNOSIS — N40.1 BENIGN PROSTATIC HYPERPLASIA WITH URINARY FREQUENCY: ICD-10-CM

## 2019-06-07 DIAGNOSIS — R35.0 BENIGN PROSTATIC HYPERPLASIA WITH URINARY FREQUENCY: ICD-10-CM

## 2019-06-07 DIAGNOSIS — N32.9 LESION OF BLADDER: Primary | ICD-10-CM

## 2019-06-07 DIAGNOSIS — Z87.442 HISTORY OF KIDNEY STONES: ICD-10-CM

## 2019-06-07 DIAGNOSIS — Z77.22 SECONDHAND SMOKE EXPOSURE: ICD-10-CM

## 2019-06-07 DIAGNOSIS — N28.1 RENAL CYST, LEFT: ICD-10-CM

## 2019-06-07 DIAGNOSIS — R31.0 GROSS HEMATURIA: ICD-10-CM

## 2019-06-07 PROCEDURE — 52000 CYSTOURETHROSCOPY: CPT | Performed by: UROLOGY

## 2019-06-07 PROCEDURE — G0463 HOSPITAL OUTPT CLINIC VISIT: HCPCS | Performed by: UROLOGY

## 2019-06-07 PROCEDURE — 99214 OFFICE O/P EST MOD 30 MIN: CPT | Performed by: UROLOGY

## 2019-06-07 RX ORDER — CIPROFLOXACIN 500 MG/1
500 TABLET, FILM COATED ORAL ONCE
Status: COMPLETED | OUTPATIENT
Start: 2019-06-07 | End: 2019-06-07

## 2019-06-07 RX ADMIN — CIPROFLOXACIN 500 MG: 500 TABLET, FILM COATED ORAL at 16:08:00

## 2019-06-07 NOTE — PROGRESS NOTES
PREOPERATIVE DIAGNOSIS:     Gross hematuria     POSTOP DIAGNOSIS:               The same; 2-3 cm lesion between right orifice and right bladder neck--potential early bladder cancer; there is some calcium adherence to the surface as well    PROCEDURE: noted above. Patient was given ciprofloxacin 500 mg p.o. immediately after the procedure. I explained the findings to the patient. History provided by patient and his wife.     DISCUSSION ON FURTHER TREATMENT OPTIONS ---    After explaining to patient possible malignancies; CT urogram; cystoscopy with Dr. Rosario July 6/7/2019 office cystoscopy with me; no stone of the bladder observed; 2-3 cm section to the right orifice and bladder neck--potential early bladder cancer; high areas of calcification with some ca explained to patient the benefits, risks, and alternatives to this treatment option and I answered patient's questions; patient decides to undergo the above outlined procedure.  I fully discussed with the patient preoperative preparations and post operative significantly changed since 2017.  I explained to patient that renal cysts are common over the age of 36; they are not cancerous or precancerous and no further treatment is needed.    (A78.298) History of kidney stones  Patient spontaneously passed stones i capsule daily--start 7 days before procedure    4. Nothing to eat or drink after midnight, night before the procedure    5. Please take your usual morning dose of atenolol with a sip of water on the morning of the procedure, soon as you wake up    6.   Do

## 2019-06-07 NOTE — PATIENT INSTRUCTIONS
Glenn Louise M.D.    1.   Cystoscopy with transurethral resection/removal of bladder tumor under general anesthesia, [facility] same-day surgery    2.   Please stop aspirin and NSAIDs (medications such as Adv

## 2019-06-07 NOTE — TELEPHONE ENCOUNTER
Dear Dr. Camelia Hoffmann,  Nurse practitioner Oziel Dean recently evaluated for work-up of gross hematuria and schedule patient for office cystoscopy with me.   Today office cystoscopy reveals a bladder tumor that requires cystoscopy and TUR at the [facility]

## 2019-06-07 NOTE — TELEPHONE ENCOUNTER
Dear RAD Cormier  You recently evaluated patient for   gross hematuria and schedule patient for office cystoscopy with me. Today office cystoscopy reveals a bladder tumor that requires cystoscopy and TUR at the hospital under general anesthesia, same-day surgery. We will set up the procedure. We will keep you posted.     Many thanks,  Chris Hall M.D.

## 2019-06-12 ENCOUNTER — TELEPHONE (OUTPATIENT)
Dept: SURGERY | Facility: CLINIC | Age: 69
End: 2019-06-12

## 2019-06-12 NOTE — TELEPHONE ENCOUNTER
Pt states he was supposed to get rx prescribed after procedure on 6/7, pt also states that PVK promised he would get call by 6/11 to schedule sx for 6/18. Pt requesting cb. Pls call thank you.

## 2019-06-13 ENCOUNTER — TELEPHONE (OUTPATIENT)
Dept: SURGERY | Facility: CLINIC | Age: 69
End: 2019-06-13

## 2019-06-13 DIAGNOSIS — D49.4 BLADDER TUMOR: Primary | ICD-10-CM

## 2019-06-13 RX ORDER — TAMSULOSIN HYDROCHLORIDE 0.4 MG/1
0.4 CAPSULE ORAL DAILY
Qty: 7 CAPSULE | Refills: 0 | Status: ON HOLD | OUTPATIENT
Start: 2019-06-13 | End: 2019-06-27

## 2019-06-13 NOTE — TELEPHONE ENCOUNTER
Spoke with pt and told him that SUNIL did state the order for the Tamsulosin in his plan at the [facility] on 6/7 but neglected to send the med to his pharmacy and since the order is written in PVK's notes I will send the script to [facility]. I also told pt that dami w

## 2019-06-13 NOTE — TELEPHONE ENCOUNTER
Hillary Gerard, I just spoke with pt about the Tamsulsoin he was supposed to start 7 days before the surgery which he was supposed to have on 6/18 that PVK did not send to his pharmacy.  He told me that he spoke with you but that he needs to reschd the surgery fitz

## 2019-06-13 NOTE — TELEPHONE ENCOUNTER
Dear Sarika Harris, per Loretta Doty' request this is to inform you that your patient is scheduled for cystoscopy, transuretheral resection bladder tumor, Thursday 06/27/19.

## 2019-06-21 NOTE — PATIENT INSTRUCTIONS
Treating Anxiety Disorders with Therapy    If you have an anxiety disorder, you don’t have to suffer anymore. Treatment is available. Therapy (also called counseling) is often a helpful treatment for anxiety disorders.  With therapy, a specially trained saroj Therapy will help you feel better and teach you skills to help manage anxiety long term. But change doesn’t happen right away. It takes a commitment from you. And treatment only works if you learn to face the causes of your anxiety.  So, you might feel wors © 6303-0996 [employer] [address]. [address] [facility], [address]. All rights reserved. This information is not intended as a substitute for professional medical care. Always follow your healthcare professional's instructions.         Shyam Padilla · Posttraumatic stress disorder. This occurs in people who have survived a terrible ordeal. It can cause nightmares and flashbacks about the event. · Generalized anxiety disorder. This causes constant worry that can greatly disrupt your life.    Getting be

## 2019-06-21 NOTE — PROGRESS NOTES
Rodger Meza is a 77year old male.   Patient presents with:  Stress: phobia for fireworks, specialist recommended to be prescibed meds to help       HPI:   Pt comes for f/u  C/c anxiety   C/o sicne 3 yo had a bad exerience with Brain in Hand and since t total daily) ) Disp: 90 tablet Rfl: 0   SIMVASTATIN 40 MG Oral Tab TAKE 1 TABLET (40 MG TOTAL) BY MOUTH NIGHTLY.  Disp: 90 tablet Rfl: 1   FREESTYLE LANCETS Does not apply Misc USE LANCETS TO CHECK BLOOD SUGAR UP TO 4 TIMES PER DAY Disp: 100 each Rfl: 2   G orders for this visit:    Anxiety  -     ALPRAZolam 0.25 MG Oral Tab; Take 1 tablet (0.25 mg total) by mouth daily as needed for Anxiety.     will give low dose alprazolam and pt is aware of the side effects such as drowsiness and feeling sleepy so he is aw

## 2019-06-25 RX ORDER — PIOGLITAZONEHYDROCHLORIDE 30 MG/1
TABLET ORAL
Qty: 90 TABLET | Refills: 1 | Status: SHIPPED | OUTPATIENT
Start: 2019-06-25 | End: 2019-08-27 | Stop reason: ALTCHOICE

## 2019-06-25 NOTE — TELEPHONE ENCOUNTER
Pt returned call and states takes pioglitazone 1/2 tab with breakfast and 1/2 tab with dinner. States JK has been in agreement with this dosage as taking full tab at once was causing low blood sugar.  Rx pended with pt reported instructions, for MD review/a

## 2019-06-27 ENCOUNTER — TELEPHONE (OUTPATIENT)
Dept: SURGERY | Facility: CLINIC | Age: 69
End: 2019-06-27

## 2019-06-27 ENCOUNTER — ANESTHESIA EVENT (OUTPATIENT)
Dept: SURGERY | Facility: HOSPITAL | Age: 69
End: 2019-06-27
Payer: MEDICARE

## 2019-06-27 ENCOUNTER — HOSPITAL ENCOUNTER (OUTPATIENT)
Facility: HOSPITAL | Age: 69
Setting detail: HOSPITAL OUTPATIENT SURGERY
Discharge: HOME | End: 2019-06-27
Attending: UROLOGY | Admitting: UROLOGY
Payer: MEDICARE

## 2019-06-27 ENCOUNTER — ANESTHESIA (OUTPATIENT)
Dept: SURGERY | Facility: HOSPITAL | Age: 69
End: 2019-06-27
Payer: MEDICARE

## 2019-06-27 VITALS
WEIGHT: 199 LBS | SYSTOLIC BLOOD PRESSURE: 147 MMHG | DIASTOLIC BLOOD PRESSURE: 76 MMHG | HEIGHT: 68 IN | OXYGEN SATURATION: 100 % | BODY MASS INDEX: 30.16 KG/M2 | TEMPERATURE: 97 F | RESPIRATION RATE: 14 BRPM | HEART RATE: 62 BPM

## 2019-06-27 DIAGNOSIS — D49.4 BLADDER TUMOR: ICD-10-CM

## 2019-06-27 PROCEDURE — 52235 CYSTOSCOPY AND TREATMENT: CPT | Performed by: UROLOGY

## 2019-06-27 PROCEDURE — 0TBB8ZX EXCISION OF BLADDER, VIA NATURAL OR ARTIFICIAL OPENING ENDOSCOPIC, DIAGNOSTIC: ICD-10-PCS | Performed by: UROLOGY

## 2019-06-27 RX ORDER — HYDROMORPHONE HYDROCHLORIDE 1 MG/ML
0.2 INJECTION, SOLUTION INTRAMUSCULAR; INTRAVENOUS; SUBCUTANEOUS EVERY 5 MIN PRN
Status: DISCONTINUED | OUTPATIENT
Start: 2019-06-27 | End: 2019-06-27

## 2019-06-27 RX ORDER — METOCLOPRAMIDE 10 MG/1
10 TABLET ORAL ONCE
Status: COMPLETED | OUTPATIENT
Start: 2019-06-27 | End: 2019-06-27

## 2019-06-27 RX ORDER — CEFADROXIL 500 MG/1
CAPSULE ORAL
Qty: 10 CAPSULE | Refills: 0 | Status: SHIPPED | OUTPATIENT
Start: 2019-06-27 | End: 2019-08-27 | Stop reason: ALTCHOICE

## 2019-06-27 RX ORDER — ONDANSETRON 2 MG/ML
INJECTION INTRAMUSCULAR; INTRAVENOUS AS NEEDED
Status: DISCONTINUED | OUTPATIENT
Start: 2019-06-27 | End: 2019-06-27 | Stop reason: SURG

## 2019-06-27 RX ORDER — HALOPERIDOL 5 MG/ML
0.25 INJECTION INTRAMUSCULAR ONCE AS NEEDED
Status: DISCONTINUED | OUTPATIENT
Start: 2019-06-27 | End: 2019-06-27

## 2019-06-27 RX ORDER — HYDROMORPHONE HYDROCHLORIDE 1 MG/ML
0.4 INJECTION, SOLUTION INTRAMUSCULAR; INTRAVENOUS; SUBCUTANEOUS EVERY 5 MIN PRN
Status: DISCONTINUED | OUTPATIENT
Start: 2019-06-27 | End: 2019-06-27

## 2019-06-27 RX ORDER — MORPHINE SULFATE 4 MG/ML
4 INJECTION, SOLUTION INTRAMUSCULAR; INTRAVENOUS EVERY 10 MIN PRN
Status: DISCONTINUED | OUTPATIENT
Start: 2019-06-27 | End: 2019-06-27

## 2019-06-27 RX ORDER — TRAMADOL HYDROCHLORIDE 50 MG/1
50 TABLET ORAL EVERY 6 HOURS PRN
Qty: 8 TABLET | Refills: 0 | Status: SHIPPED | OUTPATIENT
Start: 2019-06-27 | End: 2019-08-27 | Stop reason: ALTCHOICE

## 2019-06-27 RX ORDER — HYDROCODONE BITARTRATE AND ACETAMINOPHEN 5; 325 MG/1; MG/1
2 TABLET ORAL AS NEEDED
Status: DISCONTINUED | OUTPATIENT
Start: 2019-06-27 | End: 2019-06-27

## 2019-06-27 RX ORDER — ACETAMINOPHEN 500 MG
1000 TABLET ORAL ONCE
Status: COMPLETED | OUTPATIENT
Start: 2019-06-27 | End: 2019-06-27

## 2019-06-27 RX ORDER — MORPHINE SULFATE 10 MG/ML
6 INJECTION, SOLUTION INTRAMUSCULAR; INTRAVENOUS EVERY 10 MIN PRN
Status: DISCONTINUED | OUTPATIENT
Start: 2019-06-27 | End: 2019-06-27

## 2019-06-27 RX ORDER — SODIUM CHLORIDE, SODIUM LACTATE, POTASSIUM CHLORIDE, CALCIUM CHLORIDE 600; 310; 30; 20 MG/100ML; MG/100ML; MG/100ML; MG/100ML
INJECTION, SOLUTION INTRAVENOUS CONTINUOUS
Status: DISCONTINUED | OUTPATIENT
Start: 2019-06-27 | End: 2019-06-27

## 2019-06-27 RX ORDER — SODIUM CHLORIDE, SODIUM LACTATE, POTASSIUM CHLORIDE, CALCIUM CHLORIDE 600; 310; 30; 20 MG/100ML; MG/100ML; MG/100ML; MG/100ML
INJECTION, SOLUTION INTRAVENOUS CONTINUOUS PRN
Status: DISCONTINUED | OUTPATIENT
Start: 2019-06-27 | End: 2019-06-27 | Stop reason: SURG

## 2019-06-27 RX ORDER — MORPHINE SULFATE 2 MG/ML
2 INJECTION, SOLUTION INTRAMUSCULAR; INTRAVENOUS EVERY 10 MIN PRN
Status: DISCONTINUED | OUTPATIENT
Start: 2019-06-27 | End: 2019-06-27

## 2019-06-27 RX ORDER — FAMOTIDINE 20 MG/1
20 TABLET ORAL ONCE
Status: COMPLETED | OUTPATIENT
Start: 2019-06-27 | End: 2019-06-27

## 2019-06-27 RX ORDER — TAMSULOSIN HYDROCHLORIDE 0.4 MG/1
0.4 CAPSULE ORAL DAILY
Qty: 30 CAPSULE | Refills: 0 | Status: SHIPPED | OUTPATIENT
Start: 2019-06-27 | End: 2019-08-27 | Stop reason: ALTCHOICE

## 2019-06-27 RX ORDER — SODIUM CHLORIDE 9 MG/ML
INJECTION, SOLUTION INTRAVENOUS CONTINUOUS
Status: DISCONTINUED | OUTPATIENT
Start: 2019-06-27 | End: 2019-06-27

## 2019-06-27 RX ORDER — PHENAZOPYRIDINE HYDROCHLORIDE 200 MG/1
200 TABLET, FILM COATED ORAL 3 TIMES DAILY PRN
Qty: 20 TABLET | Refills: 1 | Status: SHIPPED | OUTPATIENT
Start: 2019-06-27 | End: 2019-10-23 | Stop reason: ALTCHOICE

## 2019-06-27 RX ORDER — LIDOCAINE HYDROCHLORIDE 10 MG/ML
INJECTION, SOLUTION EPIDURAL; INFILTRATION; INTRACAUDAL; PERINEURAL AS NEEDED
Status: DISCONTINUED | OUTPATIENT
Start: 2019-06-27 | End: 2019-06-27 | Stop reason: SURG

## 2019-06-27 RX ORDER — METOPROLOL TARTRATE 5 MG/5ML
2.5 INJECTION INTRAVENOUS ONCE
Status: DISCONTINUED | OUTPATIENT
Start: 2019-06-27 | End: 2019-06-27

## 2019-06-27 RX ORDER — HYDROCODONE BITARTRATE AND ACETAMINOPHEN 5; 325 MG/1; MG/1
1 TABLET ORAL AS NEEDED
Status: DISCONTINUED | OUTPATIENT
Start: 2019-06-27 | End: 2019-06-27

## 2019-06-27 RX ORDER — HYDROMORPHONE HYDROCHLORIDE 1 MG/ML
0.6 INJECTION, SOLUTION INTRAMUSCULAR; INTRAVENOUS; SUBCUTANEOUS EVERY 5 MIN PRN
Status: DISCONTINUED | OUTPATIENT
Start: 2019-06-27 | End: 2019-06-27

## 2019-06-27 RX ORDER — LIDOCAINE HYDROCHLORIDE 20 MG/ML
JELLY TOPICAL AS NEEDED
Status: DISCONTINUED | OUTPATIENT
Start: 2019-06-27 | End: 2019-06-27 | Stop reason: HOSPADM

## 2019-06-27 RX ORDER — ONDANSETRON 2 MG/ML
4 INJECTION INTRAMUSCULAR; INTRAVENOUS ONCE AS NEEDED
Status: DISCONTINUED | OUTPATIENT
Start: 2019-06-27 | End: 2019-06-27

## 2019-06-27 RX ORDER — PROCHLORPERAZINE EDISYLATE 5 MG/ML
5 INJECTION INTRAMUSCULAR; INTRAVENOUS ONCE AS NEEDED
Status: DISCONTINUED | OUTPATIENT
Start: 2019-06-27 | End: 2019-06-27

## 2019-06-27 RX ORDER — NALOXONE HYDROCHLORIDE 0.4 MG/ML
80 INJECTION, SOLUTION INTRAMUSCULAR; INTRAVENOUS; SUBCUTANEOUS AS NEEDED
Status: DISCONTINUED | OUTPATIENT
Start: 2019-06-27 | End: 2019-06-27

## 2019-06-27 RX ORDER — DEXTROSE MONOHYDRATE 25 G/50ML
50 INJECTION, SOLUTION INTRAVENOUS
Status: DISCONTINUED | OUTPATIENT
Start: 2019-06-27 | End: 2019-06-27

## 2019-06-27 RX ORDER — ROCURONIUM BROMIDE 10 MG/ML
INJECTION, SOLUTION INTRAVENOUS AS NEEDED
Status: DISCONTINUED | OUTPATIENT
Start: 2019-06-27 | End: 2019-06-27 | Stop reason: SURG

## 2019-06-27 RX ADMIN — ROCURONIUM BROMIDE 30 MG: 10 INJECTION, SOLUTION INTRAVENOUS at 09:06:00

## 2019-06-27 RX ADMIN — LIDOCAINE HYDROCHLORIDE 50 MG: 10 INJECTION, SOLUTION EPIDURAL; INFILTRATION; INTRACAUDAL; PERINEURAL at 09:06:00

## 2019-06-27 RX ADMIN — SODIUM CHLORIDE, SODIUM LACTATE, POTASSIUM CHLORIDE, CALCIUM CHLORIDE: 600; 310; 30; 20 INJECTION, SOLUTION INTRAVENOUS at 10:03:00

## 2019-06-27 RX ADMIN — ROCURONIUM BROMIDE 10 MG: 10 INJECTION, SOLUTION INTRAVENOUS at 09:38:00

## 2019-06-27 RX ADMIN — SODIUM CHLORIDE, SODIUM LACTATE, POTASSIUM CHLORIDE, CALCIUM CHLORIDE: 600; 310; 30; 20 INJECTION, SOLUTION INTRAVENOUS at 09:06:00

## 2019-06-27 RX ADMIN — ONDANSETRON 4 MG: 2 INJECTION INTRAMUSCULAR; INTRAVENOUS at 09:06:00

## 2019-06-27 NOTE — OPERATIVE REPORT
[facility]    PATIENT'S NAME: Javi Underwood   ATTENDING PHYSICIAN: Muriel Hdez MD   OPERATING PHYSICIAN: Muriel Hdez MD   PATIENT ACCOUNT#:   [id number]    LOCATION:  [facility] [address] [place] [address]  MEDICAL RECORD #:   [id number] early urothelial carcinoma, but I cannot confirm that without pathology analysis, but it is located in the area of the junction between the floor of the bladder and right lateral wall of the bladder and it moves up the right lateral wall.   To avoid possibi above and then destroyed the rest of it with fulguration, as above. At the end of the case, there was no bleeding. All tumor had been either removed or treated with fulguration and destroyed. There was some minor oozing from the patient's BPH.   I then i

## 2019-06-27 NOTE — TELEPHONE ENCOUNTER
Nurses,  Please call patient tomorrow morning after 8 AM and check on color of urine; if good, have him come to the office to have Arellano catheter removed. 6/27/2019 cystoscopy with TUR bladder tumor; patient being sent home with Arellano catheter.     The f medication.   These medications will turn your urine a bright orange red and will permanently stain white underwear    9. avoid aspirin or NSAIDs (medications such as Advil, Motrin, Aleve, ibuprofen) for 10 days after procedure or longer if urine is bloody

## 2019-06-27 NOTE — BRIEF OP NOTE
[facility] POST ANESTHESIA CARE UNIT  Brief Op Note       Patients Name: Suzanna Fry  Attending Physician: Ana Pascual MD  Operating Physician: Kelly Luna MD  CSN: [id number]     Location:  OR  MRN: [id number]    Date of Birth:

## 2019-06-27 NOTE — H&P
PREOPERATIVE HISTORY AND PHYSICAL    Nadiya Garcia MD   Physician   UROLOGY   Progress Notes   Signed   Encounter Date:  6/7/2019               Signed                  History provided by patient and his wife.           After explaining to patient the 5/15/2019 [address] APRN (Urology); last stone was a 4 mm left UJV stone in 2017 that he was able to pass on his own; work up regarding gross hematuria include infection, kidney stones, and possible malignancies; CT urogram; cystoscopy with Dr. Familia Reynolds Length  prostatic urethra:   3 cm with flexible scope  Lateral lobes                  :  Mild enlargement  Intravesical median lobe:   Mild intravesical circumferential projection   High riding    median bar:  Present  Degree BPH obstruction:    Mild-moder On cystoscopy today, 2-3 cm lesion between right orifice and right bladder neck--potential early bladder cancer; there is some calcium adherence to the surface as well.  I explained to patient that if the lesion is cancer, it is likely to be a mild, slow gr Per Suresh SLATER's note on 5/15/2019, prostate 3+ enlarged, non tender, no palpable nodules. Because of his increased BPH he is at increased risk for urinary retention. We discussed starting tamsulosin 7 days before procedure.  I fully explained t Patient's parents as well as a roommate in college smoked indoors, exposing him to secondhand smoke.  I explained to the patient that this exposure increases risk for bladder cancer.        I explained to patient the benefits, risks, complications, side eff

## 2019-06-27 NOTE — ANESTHESIA PROCEDURE NOTES
Airway  Urgency: elective    Airway not difficult    General Information and Staff    Patient location during procedure: OR  Anesthesiologist: Shanna Bhakta MD  Resident/CRNA: Ca Ridley CRNA  Performed: CRNA     Indications and Patient Co

## 2019-06-27 NOTE — INTERVAL H&P NOTE
Pre-op Diagnosis: Bladder tumor [D49.4]    The above referenced H&P was reviewed by Cici Harris MD on 6/27/2019, the patient was examined and no significant changes have occurred in the patient's condition since the H&P was performed.   I discussed wi

## 2019-06-27 NOTE — ANESTHESIA POSTPROCEDURE EVALUATION
Patient: Chan Saenz    Procedure Summary     Date:  06/27/19 Room / Location:  [facility] OR  / [facility] OR    Anesthesia Start:  8805 Anesthesia Stop:  9773    Procedure:  CYSTOSCOPY TRANSURETHRAL RESECTION BLADDER TUMOR (N/A ) Diagnosis:       B

## 2019-06-27 NOTE — ANESTHESIA PREPROCEDURE EVALUATION
Anesthesia PreOp Note    HPI:     Adriane Arechiga is a 77year old male who presents for preoperative consultation requested by: Tri Koch MD    Date of Surgery: 6/27/2019    Procedure(s):  CYSTOSCOPY TRANSURETHRAL RESECTION BLADDER TUMOR  Ind lisinopril 40 MG Oral Tab Take 1 tablet (40 mg total) by mouth once daily.  Disp: 90 tablet Rfl: 0 6/26/2019 at 0800   Omega-3-acid Ethyl Esters 1 g Oral Cap TAKE 1 CAPSULE BY MOUTH TWICE A DAY Disp: 180 capsule Rfl: 1 6/25/2019 at Unknown time   amLBELKISPi Not on file      Transportation needs:        Medical: Not on file        Non-medical: Not on file    Tobacco Use      Smoking status: Never Smoker      Smokeless tobacco: Never Used    Substance and Sexual Activity      Alcohol use: Yes        Comment: so Resp:  18   Temp:  98.6 °F (37 °C)   TempSrc:  Oral   SpO2:  97%   Weight: 91.3 kg (201 lb 6 oz) 90.3 kg (199 lb)   Height: 1.727 m (5' 8\") 1.727 m (5' 8\")        Anesthesia Evaluation     Patient summary reviewed and Nursing notes reviewed    Airway

## 2019-06-28 ENCOUNTER — NURSE ONLY (OUTPATIENT)
Dept: SURGERY | Facility: CLINIC | Age: 69
End: 2019-06-28
Payer: MEDICARE

## 2019-06-28 DIAGNOSIS — Z46.6 ENCOUNTER FOR FOLEY CATHETER REMOVAL: Primary | ICD-10-CM

## 2019-06-28 PROCEDURE — 51700 IRRIGATION OF BLADDER: CPT | Performed by: UROLOGY

## 2019-06-28 NOTE — PROGRESS NOTES
See 19 TE;orders call for physician's order. Patient's name and  verified. Patient presents for monzon catheter removal.  Patient's urine in monzon bag is noted to be clear yellow. Monzon catheter balloon deflated of all its' contents.  Using a tyrell

## 2019-06-28 NOTE — TELEPHONE ENCOUNTER
Patient contacted. Patient states he would like his wife to speak with me, patient's wife on the phone, states patient's urine has been getting light, now urine is yellowish, no clots.   Informed patient's wife that urine color is good, come to office for n

## 2019-07-02 ENCOUNTER — TELEPHONE (OUTPATIENT)
Dept: SURGERY | Facility: CLINIC | Age: 69
End: 2019-07-02

## 2019-07-02 NOTE — TELEPHONE ENCOUNTER
Pt has sx on 06/27/19 and pts wife states they are still waiting to hear back about pathology results.

## 2019-07-03 RX ORDER — LANCETS 28 GAUGE
EACH MISCELLANEOUS
Qty: 100 EACH | Refills: 1 | Status: SHIPPED | OUTPATIENT
Start: 2019-07-03 | End: 2021-01-25

## 2019-07-03 RX ORDER — BLOOD-GLUCOSE METER
KIT MISCELLANEOUS
Qty: 100 STRIP | Refills: 1 | Status: SHIPPED | OUTPATIENT
Start: 2019-07-03 | End: 2021-01-22

## 2019-07-08 NOTE — TELEPHONE ENCOUNTER
Wife calling back - states she spoke to [address] re results - he wants pt to come in to discuss cancer - looking for appt

## 2019-07-09 NOTE — TELEPHONE ENCOUNTER
Pt's wife is at the  asking to speak to a nurse. She stated she has called many time with no response. She received the test results from Margi Ratliff and he told her we would call. Pt  Is waiting to speak to a nurse.

## 2019-07-11 ENCOUNTER — OFFICE VISIT (OUTPATIENT)
Dept: SURGERY | Facility: CLINIC | Age: 69
End: 2019-07-11
Payer: MEDICARE

## 2019-07-11 VITALS
WEIGHT: 199 LBS | DIASTOLIC BLOOD PRESSURE: 77 MMHG | SYSTOLIC BLOOD PRESSURE: 169 MMHG | HEART RATE: 68 BPM | HEIGHT: 68 IN | BODY MASS INDEX: 30.16 KG/M2

## 2019-07-11 DIAGNOSIS — R35.1 NOCTURIA: ICD-10-CM

## 2019-07-11 DIAGNOSIS — Z87.442 HISTORY OF KIDNEY STONES: ICD-10-CM

## 2019-07-11 DIAGNOSIS — N40.1 BENIGN PROSTATIC HYPERPLASIA WITH NOCTURIA: ICD-10-CM

## 2019-07-11 DIAGNOSIS — N32.0 BLADDER NECK OBSTRUCTION: ICD-10-CM

## 2019-07-11 DIAGNOSIS — R35.1 BENIGN PROSTATIC HYPERPLASIA WITH NOCTURIA: ICD-10-CM

## 2019-07-11 DIAGNOSIS — N47.1 PHIMOSIS: ICD-10-CM

## 2019-07-11 DIAGNOSIS — N28.1 COMPLEX RENAL CYST: ICD-10-CM

## 2019-07-11 DIAGNOSIS — C67.2 MALIGNANT NEOPLASM OF LATERAL WALL OF URINARY BLADDER (HCC): Primary | ICD-10-CM

## 2019-07-11 PROCEDURE — 99215 OFFICE O/P EST HI 40 MIN: CPT | Performed by: UROLOGY

## 2019-07-11 PROCEDURE — G0463 HOSPITAL OUTPT CLINIC VISIT: HCPCS | Performed by: UROLOGY

## 2019-07-11 RX ORDER — DIAZEPAM 10 MG/1
TABLET ORAL
Qty: 1 TABLET | Refills: 0 | Status: SHIPPED | OUTPATIENT
Start: 2019-07-11 | End: 2019-08-27 | Stop reason: ALTCHOICE

## 2019-07-11 NOTE — PROGRESS NOTES
HPI:    Patient ID: Conchita Cox is a 77year old male. HPI     History provided by patient and his wife. Bladder Cancer  New problem. Problem started 6/27/19 with pathology showing low-grade papillary urothelial carcinoma, non-invasive.  [facility]).   1/30/2017 Dr. Hola Maya [facility] ER;  Left flank pain; start tamsulosin 0.4 mg daily; follow-up with Dr. Mary Oneill Urology  3/22/2018 Dr. Mary Oneill (Urology); left renal colic microscopic hematuria and a CAT scan showing a 4 mm left UVJ st Negative for abdominal pain and constipation. Genitourinary: Negative for dysuria, flank pain and hematuria (Gross).         Positive for sensation of not emptying bladder, urinary frequency less than 2 hours, intermittent stream, weak stream, and nocturi Besylate 5 MG Oral Tab Take 1 tablet (5 mg total) by mouth daily. Disp: 30 tablet Rfl: 5   SIMVASTATIN 40 MG Oral Tab TAKE 1 TABLET (40 MG TOTAL) BY MOUTH NIGHTLY.  Disp: 90 tablet Rfl: 1   latanoprost 0.005 % Ophthalmic Solution INSTILL 1 DROP INTO BOTH EY begin urination?: Not at all  Over the past month, how many times per night did you most typically get up to urinate from the time you went to bed at night until the time you got up in the morning?: Less than half the time  Total Symptom Score: 6  If you w without contrast; prostatomegaly with probable chronic; additional suspicious lesions of the genitourinary tract are identified; no lymphadenopathy to the pelvic nodes or retroperitoneum.      I spent 40  minutes with patient, and majority of this time was above. He wants the cystoscopy, possible biopsy under local anesthesia and does want oral diazepam 10 mg.  I fully discussed with the patient preoperative preparations and post operative care--please see instructions below.     (N40.1,  R35.1) Benign prosta medication. He denies ever having surgery on his stones. He drinks a lot of water and alters his diet to avoid kidney stones.       I explained to patient the risks, side effects, and alternatives, and I answered questions concerning them; patient Edwards by the scribe were at my direction and in my presence. I have reviewed the chart and discharge instructions (if applicable) and agree that the record reflects my personal performance and is accurate and complete.   Monroe Joe MD, 7/11/2019, 4:01 PM

## 2019-07-11 NOTE — PATIENT INSTRUCTIONS
Doc Joy M.D.    1.  Rachel Socrates may stop your tamsulosin. If after 5 days, you notice a general worsening in the way you urinate or if you start viewing urination is more of a \"bother\", you can restart the medication.   You

## 2019-07-26 RX ORDER — LISINOPRIL 40 MG/1
TABLET ORAL
Qty: 90 TABLET | Refills: 0 | Status: SHIPPED | OUTPATIENT
Start: 2019-07-26 | End: 2019-10-23

## 2019-08-27 ENCOUNTER — OFFICE VISIT (OUTPATIENT)
Dept: INTERNAL MEDICINE CLINIC | Facility: CLINIC | Age: 69
End: 2019-08-27
Payer: MEDICARE

## 2019-08-27 VITALS
HEART RATE: 58 BPM | RESPIRATION RATE: 16 BRPM | WEIGHT: 199 LBS | BODY MASS INDEX: 30.16 KG/M2 | DIASTOLIC BLOOD PRESSURE: 76 MMHG | HEIGHT: 68 IN | TEMPERATURE: 98 F | SYSTOLIC BLOOD PRESSURE: 148 MMHG

## 2019-08-27 DIAGNOSIS — J06.9 UPPER RESPIRATORY TRACT INFECTION, UNSPECIFIED TYPE: ICD-10-CM

## 2019-08-27 DIAGNOSIS — I10 ESSENTIAL HYPERTENSION: Primary | ICD-10-CM

## 2019-08-27 DIAGNOSIS — N28.1 BENIGN CYST OF LEFT KIDNEY: ICD-10-CM

## 2019-08-27 PROCEDURE — 99214 OFFICE O/P EST MOD 30 MIN: CPT | Performed by: INTERNAL MEDICINE

## 2019-08-27 PROCEDURE — G0463 HOSPITAL OUTPT CLINIC VISIT: HCPCS | Performed by: INTERNAL MEDICINE

## 2019-08-27 RX ORDER — AMLODIPINE BESYLATE 5 MG/1
5 TABLET ORAL 2 TIMES DAILY
Qty: 180 TABLET | Refills: 1 | Status: SHIPPED | OUTPATIENT
Start: 2019-08-27 | End: 2020-02-17

## 2019-08-27 RX ORDER — AZITHROMYCIN 250 MG/1
TABLET, FILM COATED ORAL
Qty: 6 TABLET | Refills: 0 | Status: SHIPPED | OUTPATIENT
Start: 2019-08-27 | End: 2019-10-23 | Stop reason: ALTCHOICE

## 2019-08-27 NOTE — PROGRESS NOTES
Magdalena De Guzman is a 77year old male.   Patient presents with:  Cough  URI      HPI:   Pt comes as an urgent visit   C/c uri   C/o cold sympt x 20 days   Ros as below   Since last visit he saw his urologist--had surgery for bladder cancer and did diana • Essential hypertension    • Hyperlipidemia    • Kidney stone    • Pyloric stenosis    • Virginia Hunt syndrome (geniculate herpes zoster) 2016    Rx with prednisone      Past Surgical History:   Procedure Laterality Date   • Colonoscopy     • Tonsillecto Increase the dose of amlodipine to 10 mg but he prefers to take it divided in 2 doses a day  He has an upcoming appointment with his PCP in October  He will monitor his blood pressures until then    Upper respiratory tract infection, unspecified type  -

## 2019-08-27 NOTE — PATIENT INSTRUCTIONS
Eating for your heart doesn’t have to be hard or boring. You just need to know how to make healthier choices. The DASH eating plan has been developed to help you do just that. DASH stands for Dietary Approaches to Stop Hypertension.  It is a plan that has b visible fat. Broil, grill, roast, or boil instead of frying. Remove skin from poultry before eating.  Limit how much red meat you eat.  Nuts, seeds, beans  Servings: 4 to 5 a week  A serving is:  · One-third cup nuts (one and a half ounces)  · 2 tablespoons salt  Stay away from:  · Sausage, ontiveros, and ham  · Flour tortillas  · Packaged muffins, pancakes, and biscuits  · Instant hot cereals  · Cottage cheese  For lunch and dinner  · Fresh fish, chicken, turkey, or meat—baked, broiled, or roasted without salt

## 2019-09-29 RX ORDER — AMLODIPINE BESYLATE 5 MG/1
TABLET ORAL
Qty: 90 TABLET | Refills: 1 | OUTPATIENT
Start: 2019-09-29

## 2019-10-07 RX ORDER — SIMVASTATIN 40 MG
TABLET ORAL
Qty: 90 TABLET | Refills: 0 | OUTPATIENT
Start: 2019-10-07

## 2019-10-07 NOTE — TELEPHONE ENCOUNTER
Refused refill for simvastatin due to possible interaction with amlodipine at this dose  Can try atorvastatin 40 mg daily  Please let patient know and prescribe if he is okay  Thanks

## 2019-10-07 NOTE — TELEPHONE ENCOUNTER
Current Outpatient Medications:  SIMVASTATIN 40 MG Oral Tab TAKE 1 TABLET (40 MG TOTAL) BY MOUTH NIGHTLY.  Disp: 90 tablet Rfl: 1     refill

## 2019-10-08 RX ORDER — ATORVASTATIN CALCIUM 40 MG/1
40 TABLET, FILM COATED ORAL NIGHTLY
Qty: 90 TABLET | Refills: 0 | Status: SHIPPED | OUTPATIENT
Start: 2019-10-08 | End: 2019-10-22

## 2019-10-08 NOTE — TELEPHONE ENCOUNTER
Called the patient. He agrees with medication change.  Discontinued simvastatin with pharmacy and sent in new Rx for atorvastatin 40 mg nightly as written by AM.

## 2019-10-11 NOTE — TELEPHONE ENCOUNTER
Pt is asking for alternative for Atorvastatin (did not tolerate in past due significant muscle cramping). Have not picked it up.   Per note below \"Refused refill for simvastatin due to possible interaction with amlodipine at this dose can try atorvastatin

## 2019-10-16 ENCOUNTER — APPOINTMENT (OUTPATIENT)
Dept: LAB | Facility: HOSPITAL | Age: 69
End: 2019-10-16
Attending: UROLOGY
Payer: MEDICARE

## 2019-10-16 DIAGNOSIS — C67.2 MALIGNANT NEOPLASM OF LATERAL WALL OF URINARY BLADDER (HCC): ICD-10-CM

## 2019-10-16 PROCEDURE — 88108 CYTOPATH CONCENTRATE TECH: CPT

## 2019-10-18 ENCOUNTER — APPOINTMENT (OUTPATIENT)
Dept: LAB | Facility: HOSPITAL | Age: 69
End: 2019-10-18
Attending: INTERNAL MEDICINE
Payer: MEDICARE

## 2019-10-18 DIAGNOSIS — E11.8 CONTROLLED TYPE 2 DIABETES MELLITUS WITH COMPLICATION, WITHOUT LONG-TERM CURRENT USE OF INSULIN (HCC): ICD-10-CM

## 2019-10-18 PROCEDURE — 82570 ASSAY OF URINE CREATININE: CPT

## 2019-10-18 PROCEDURE — 80053 COMPREHEN METABOLIC PANEL: CPT

## 2019-10-18 PROCEDURE — 36415 COLL VENOUS BLD VENIPUNCTURE: CPT

## 2019-10-18 PROCEDURE — 82043 UR ALBUMIN QUANTITATIVE: CPT

## 2019-10-22 ENCOUNTER — TELEPHONE (OUTPATIENT)
Dept: SURGERY | Facility: CLINIC | Age: 69
End: 2019-10-22

## 2019-10-22 ENCOUNTER — OFFICE VISIT (OUTPATIENT)
Dept: ENDOCRINOLOGY CLINIC | Facility: CLINIC | Age: 69
End: 2019-10-22
Payer: MEDICARE

## 2019-10-22 VITALS
SYSTOLIC BLOOD PRESSURE: 155 MMHG | HEART RATE: 59 BPM | DIASTOLIC BLOOD PRESSURE: 79 MMHG | WEIGHT: 206.19 LBS | BODY MASS INDEX: 31 KG/M2

## 2019-10-22 DIAGNOSIS — R79.89 HIGH SERUM THYROID STIMULATING HORMONE (TSH): ICD-10-CM

## 2019-10-22 DIAGNOSIS — Z13.29 THYROID DISORDER SCREENING: ICD-10-CM

## 2019-10-22 DIAGNOSIS — E11.8 CONTROLLED TYPE 2 DIABETES MELLITUS WITH COMPLICATION, WITHOUT LONG-TERM CURRENT USE OF INSULIN (HCC): Primary | ICD-10-CM

## 2019-10-22 DIAGNOSIS — E78.5 DYSLIPIDEMIA: ICD-10-CM

## 2019-10-22 PROCEDURE — 83036 HEMOGLOBIN GLYCOSYLATED A1C: CPT | Performed by: INTERNAL MEDICINE

## 2019-10-22 PROCEDURE — 82962 GLUCOSE BLOOD TEST: CPT | Performed by: INTERNAL MEDICINE

## 2019-10-22 PROCEDURE — 36416 COLLJ CAPILLARY BLOOD SPEC: CPT | Performed by: INTERNAL MEDICINE

## 2019-10-22 PROCEDURE — 99213 OFFICE O/P EST LOW 20 MIN: CPT | Performed by: INTERNAL MEDICINE

## 2019-10-22 RX ORDER — PRAVASTATIN SODIUM 40 MG
40 TABLET ORAL NIGHTLY
Qty: 90 TABLET | Refills: 0 | Status: SHIPPED | OUTPATIENT
Start: 2019-10-22 | End: 2020-01-14

## 2019-10-22 NOTE — TELEPHONE ENCOUNTER
Noted. [place] Matters , see pt's message below as well as portion of your plan from lov copied below as follows. Phoned pt and spoke with him.  Informed him plan for cysto as scheduled and staff will call him back only if Kong Brooks has alternative or additiona

## 2019-10-22 NOTE — TELEPHONE ENCOUNTER
Pt has cysto tomorrow - he did not stop fish oil pill - asking if that is ok - today will be fist day he stops med

## 2019-10-22 NOTE — PROGRESS NOTES
Return Office Visit     CHIEF COMPLAINT:    DM    Dyslipidemia    HISTORY OF PRESENT ILLNESS:  Lv Wilson is a 77year old male who presents for follow up for DM. He was diagnosed with bladder cancer s/p surgery, doing well.      DM HISTORY:  D (200 mg total) by mouth 3 (three) times daily as needed (for burning pain with urination). , Disp: 20 tablet, Rfl: 1  ALPRAZolam 0.25 MG Oral Tab, Take 1 tablet (0.25 mg total) by mouth daily as needed for Anxiety. , Disp: 20 tablet, Rfl: 0          ALLERGY: soft  SKIN:  no bruising or bleeding, no rashes and no lesions  EXTREMITIES: normal pulses, no edema, normal monofilament sensation b/l      DATA:         A1c 6.5 % ( 3/2017) --> 6.4 % ( 9/2017) --> 6.9 % ( 3/2018) --> 7.4 % ( 12/2018) --> 6.8 % --> 6.5 %

## 2019-10-23 ENCOUNTER — TELEPHONE (OUTPATIENT)
Dept: SURGERY | Facility: CLINIC | Age: 69
End: 2019-10-23

## 2019-10-23 ENCOUNTER — OFFICE VISIT (OUTPATIENT)
Dept: SURGERY | Facility: CLINIC | Age: 69
End: 2019-10-23
Payer: MEDICARE

## 2019-10-23 VITALS
DIASTOLIC BLOOD PRESSURE: 80 MMHG | SYSTOLIC BLOOD PRESSURE: 144 MMHG | TEMPERATURE: 98 F | WEIGHT: 206 LBS | RESPIRATION RATE: 18 BRPM | BODY MASS INDEX: 31.22 KG/M2 | HEART RATE: 64 BPM | HEIGHT: 68 IN

## 2019-10-23 DIAGNOSIS — N47.1 PHIMOSIS: ICD-10-CM

## 2019-10-23 DIAGNOSIS — C67.2 MALIGNANT NEOPLASM OF LATERAL WALL OF URINARY BLADDER (HCC): Primary | ICD-10-CM

## 2019-10-23 DIAGNOSIS — N28.1 COMPLEX RENAL CYST: ICD-10-CM

## 2019-10-23 DIAGNOSIS — R35.1 NOCTURIA: ICD-10-CM

## 2019-10-23 DIAGNOSIS — Z87.442 HISTORY OF KIDNEY STONES: ICD-10-CM

## 2019-10-23 DIAGNOSIS — N40.1 BENIGN PROSTATIC HYPERPLASIA WITH NOCTURIA: ICD-10-CM

## 2019-10-23 DIAGNOSIS — R35.1 BENIGN PROSTATIC HYPERPLASIA WITH NOCTURIA: ICD-10-CM

## 2019-10-23 PROCEDURE — 52000 CYSTOURETHROSCOPY: CPT | Performed by: UROLOGY

## 2019-10-23 PROCEDURE — 99214 OFFICE O/P EST MOD 30 MIN: CPT | Performed by: UROLOGY

## 2019-10-23 PROCEDURE — G0463 HOSPITAL OUTPT CLINIC VISIT: HCPCS | Performed by: UROLOGY

## 2019-10-23 RX ORDER — TAMSULOSIN HYDROCHLORIDE 0.4 MG/1
0.4 CAPSULE ORAL DAILY
Qty: 30 CAPSULE | Refills: 11 | Status: SHIPPED | OUTPATIENT
Start: 2019-10-23 | End: 2020-11-10

## 2019-10-23 RX ORDER — DIAZEPAM 10 MG/1
TABLET ORAL
Qty: 3 TABLET | Refills: 0 | Status: SHIPPED | OUTPATIENT
Start: 2019-10-23 | End: 2020-01-27

## 2019-10-23 RX ORDER — CIPROFLOXACIN 500 MG/1
500 TABLET, FILM COATED ORAL ONCE
Status: COMPLETED | OUTPATIENT
Start: 2019-10-23 | End: 2019-10-23

## 2019-10-23 RX ORDER — PIOGLITAZONEHYDROCHLORIDE 30 MG/1
TABLET ORAL
Refills: 1 | COMMUNITY
Start: 2019-09-20 | End: 2020-07-24

## 2019-10-23 RX ORDER — LISINOPRIL 40 MG/1
TABLET ORAL
Qty: 90 TABLET | Refills: 0 | Status: SHIPPED | OUTPATIENT
Start: 2019-10-23 | End: 2020-02-07

## 2019-10-23 RX ADMIN — CIPROFLOXACIN 500 MG: 500 TABLET, FILM COATED ORAL at 08:54:00

## 2019-10-23 NOTE — PATIENT INSTRUCTIONS
Kiel Tucker M.D.        1. If your urine is bloody, please drink enough liquids to dilute out the blood. If the urine doesn't show any signs of blood, you can drink your usual amount of liquids.  Once there are no signs of such as nuts, peanut butter, tea, ice tea, green tea, cocoa, spinach, dark green leafy vegetables, dark berries. Also the official recommendation is to drink 2.5 liters of water and lemonade ( 8 cups! )  per day.   Lemonade is high in the natural chemical

## 2019-10-23 NOTE — TELEPHONE ENCOUNTER
See today's treatment plan for MD's recommendation on starting tamsulosin 0.4 mg daily.       Medication was ordered and confirmed to go to [facility] @ 9:50 am.

## 2019-10-23 NOTE — PROGRESS NOTES
PREOPERATIVE DIAGNOSIS:    Malignant neoplasm of lateral wall of urinary bladder (HCC)      POSTOP DIAGNOSIS:               The same;  No tumors, stones, or CIS of the bladder or bladder neck    PROCEDURE:              Cystoscopy              ANESTHESIA: papillary urothelial carcinoma, non-invasive. Patient had an episode of gross hematuria 5/11/19, follow up cystoscopy showed a 2-3 cm bladder lesion, lesion was resected and fulgurated under general anesthesia on 6/27/19 cystoscopy.  Pathology originally pe passage  5/15/2019 [employer] (Urology); last stone was a 4 mm left UJV stone in 2017 that he was able to pass on his own; work up regarding gross hematuria include infection, kidney stones, and possible malignancies; CT urogram; cystoscopy with 10/23/19  0750 10/23/19  0912   BP: 157/68 144/80   Pulse: 71 64   Resp: 18    Temp: 97.7 °F (36.5 °C)    Weight: 206 lb (93.4 kg)    Height: 5' 8\" (1.727 m)          Body mass index is 31.32 kg/m².       PATHOLOGY  6/27/2019 bladder tumor = low-grade mica 5/15/2019 urine cytology was negative for high-grade urothelial carcinoma; supporting both pathology reports showing low-grade urothelial carcinoma. Patient has a history of secondhand smoke and organic solvent exposure.  On cystoscopy today 10/23/2019, no again in order to improve nocturia.  I fully explained to patient the benefits, risks, and alternatives to this treatment option and I answered patient's questions; patient decides to restart tamsulosin 0.4 mg daily.    (A00.208) History of kidney stones  P must have a     6. Please stop aspirin and NSAIDs (medications such as Advil, Motrin, Aleve, ibuprofen), fish oil pills for 7 days before procedure. 7.  Please do not take any vitamin pills for 24 hours before the procedure    8.   Please eat vanessa daily, however, try to avoid cheese which is naturally high in salt. Be careful not to take high doses of calcium; you may be better off taking vitamin D3 instead.   With respect to how much vitamin D3 to take, getting blood levels of vitamin D once or twi

## 2019-10-23 NOTE — TELEPHONE ENCOUNTER
Wife states that the pt. just had a cysto proc done this morning and Rx for tamsulosin . 4mg was not received at [facility].

## 2019-10-28 ENCOUNTER — OFFICE VISIT (OUTPATIENT)
Dept: INTERNAL MEDICINE CLINIC | Facility: CLINIC | Age: 69
End: 2019-10-28
Payer: MEDICARE

## 2019-10-28 VITALS
TEMPERATURE: 98 F | DIASTOLIC BLOOD PRESSURE: 64 MMHG | HEART RATE: 66 BPM | WEIGHT: 202.31 LBS | BODY MASS INDEX: 30.66 KG/M2 | HEIGHT: 68 IN | RESPIRATION RATE: 20 BRPM | SYSTOLIC BLOOD PRESSURE: 118 MMHG

## 2019-10-28 DIAGNOSIS — E78.5 HYPERLIPIDEMIA, UNSPECIFIED HYPERLIPIDEMIA TYPE: ICD-10-CM

## 2019-10-28 DIAGNOSIS — I10 ESSENTIAL HYPERTENSION: ICD-10-CM

## 2019-10-28 DIAGNOSIS — Z23 NEED FOR VACCINATION: ICD-10-CM

## 2019-10-28 DIAGNOSIS — C67.9 MALIGNANT NEOPLASM OF URINARY BLADDER, UNSPECIFIED SITE (HCC): ICD-10-CM

## 2019-10-28 DIAGNOSIS — Z00.00 ENCOUNTER FOR ANNUAL HEALTH EXAMINATION: Primary | ICD-10-CM

## 2019-10-28 PROCEDURE — G0008 ADMIN INFLUENZA VIRUS VAC: HCPCS | Performed by: INTERNAL MEDICINE

## 2019-10-28 PROCEDURE — G0439 PPPS, SUBSEQ VISIT: HCPCS | Performed by: INTERNAL MEDICINE

## 2019-10-28 PROCEDURE — 90662 IIV NO PRSV INCREASED AG IM: CPT | Performed by: INTERNAL MEDICINE

## 2019-10-28 NOTE — PROGRESS NOTES
Below.  Continue the amlodipine and lisinopril. Since that time he saw the urologist.  Evaluation of hematuria revealed bladder cancer. He has had it removed and they are watching it for now. Also saw the endocrinology doctor for the diabetes.   She bhat Epic.   Advance care planning including the explanation and discussion of advance directives standard forms performed Face to Face with patient and Family/surrogate (if present), and forms available to patient in AVS       He has a Power of  for He 1 tablet by mouth upon arrival at OFFICE, for follow-up cystoscopies; you must have a . tamsulosin HCl 0.4 MG Oral Cap, Take 1 capsule (0.4 mg total) by mouth daily.  Take 1/2 hour following the same meal each day  Pravastatin Sodium 40 MG Oral Tab, sexual dysfunction. Musculoskeletal: Negative for joint pain. Skin: Positive for rash. Neurological: Negative for weakness, numbness and headaches. Hematological: Does not bruise/bleed easily. Psychiatric/Behavioral: Negative for depressed mood. have hearing loss:   No               Visual Acuity  Right Eye Visual Acuity: Corrected Right Eye Chart Acuity: 20/50   Left Eye Visual Acuity: Corrected Left Eye Chart Acuity: 20/50   Both Eyes Visual Acuity: Corrected Both Eyes Chart Acuity: 20/50   Able 10/06/2015, 09/06/2016   • Influenza 09/15/2009, 09/16/2013, 10/06/2015, 09/06/2016   • Pneumococcal (Prevnar 13) 09/06/2016   • Pneumovax 23 06/17/2011, 02/05/2018        ASSESSMENT AND OTHER RELEVANT CHRONIC CONDITIONS:   Alesia Harris is a 77 year HEMOGLOBIN A1C (%)   Date Value   10/22/2019 6.5 (A)       No flowsheet data found.     Fasting Blood Sugar (FSB)Annually Glucose (mg/dL)   Date Value   10/18/2019 124 (H)          Cardiovascular Disease Screening     LDL Annually LDL Cholesterol (mg/dL) Medicare Part B No vaccine history found This may be covered with your pharmacy  prescription benefits      SPECIFIC DISEASE MONITORING Internal Lab or Procedure External Lab or Procedure      Annual Monitoring of Persistent     Medications (ACE/ARB, digox

## 2019-11-11 ENCOUNTER — PATIENT MESSAGE (OUTPATIENT)
Dept: INTERNAL MEDICINE CLINIC | Facility: CLINIC | Age: 69
End: 2019-11-11

## 2019-11-12 RX ORDER — ICOSAPENT ETHYL 1000 MG/1
1 CAPSULE ORAL 2 TIMES DAILY
Qty: 180 CAPSULE | Refills: 1 | Status: SHIPPED | OUTPATIENT
Start: 2019-11-12 | End: 2020-03-20

## 2019-11-12 NOTE — TELEPHONE ENCOUNTER
From: Conchita Cox  To: Bebe Hanson MD  Sent: 11/11/2019 12:21 PM CST  Subject: Prescription Question    I have received notice from [employer] [facility] that they will not cover Omega-3-Ethyl Esters (1 gm) beginning 1/1/2020, but they will cover Vascepa inste

## 2019-11-22 RX ORDER — OMEGA-3-ACID ETHYL ESTERS 1 G/1
CAPSULE, LIQUID FILLED ORAL
Qty: 180 CAPSULE | Refills: 0 | Status: SHIPPED | OUTPATIENT
Start: 2019-11-22 | End: 2020-04-24

## 2019-11-22 NOTE — TELEPHONE ENCOUNTER
Refill passed per [facility] protocol.     Cholesterol Medications  Protocol Criteria:  · Appointment scheduled in the past 12 months or in the next 3 months  · ALT & LDL on file in the past 12 months  · ALT result < 80  · LDL result <130   Recent Outp

## 2019-12-14 RX ORDER — PIOGLITAZONEHYDROCHLORIDE 30 MG/1
TABLET ORAL
Qty: 90 TABLET | Refills: 1 | Status: SHIPPED | OUTPATIENT
Start: 2019-12-14 | End: 2020-01-29

## 2019-12-14 NOTE — TELEPHONE ENCOUNTER
Refill passed per [facility] protocol.   Diabetes Medications  Protocol Criteria:  · Appointment scheduled in the past 6 months or the next 3 months  · A1C < 7.5 in the past 6 months  · Creatinine in the past 12 months  · Creatinine result < 1.5   Rece

## 2020-01-14 RX ORDER — PRAVASTATIN SODIUM 40 MG
TABLET ORAL
Qty: 90 TABLET | Refills: 0 | Status: SHIPPED | OUTPATIENT
Start: 2020-01-14 | End: 2020-05-13

## 2020-01-27 ENCOUNTER — APPOINTMENT (OUTPATIENT)
Dept: LAB | Facility: HOSPITAL | Age: 70
End: 2020-01-27
Attending: UROLOGY
Payer: MEDICARE

## 2020-01-27 ENCOUNTER — TELEPHONE (OUTPATIENT)
Dept: SURGERY | Facility: CLINIC | Age: 70
End: 2020-01-27

## 2020-01-27 DIAGNOSIS — C67.2 MALIGNANT NEOPLASM OF LATERAL WALL OF URINARY BLADDER (HCC): ICD-10-CM

## 2020-01-27 PROCEDURE — 88108 CYTOPATH CONCENTRATE TECH: CPT

## 2020-01-27 RX ORDER — DIAZEPAM 10 MG/1
TABLET ORAL
Qty: 3 TABLET | Refills: 0 | Status: ON HOLD | OUTPATIENT
Start: 2020-01-27 | End: 2021-05-19

## 2020-01-27 NOTE — TELEPHONE ENCOUNTER
Pt's wife called. Pt is scheduled 1-29-20 for Cystoscopy. Pt is missing the rx. Diazepam 10 mg. [facility] rx[facility] or does pt need to .   Please call to advise

## 2020-01-29 ENCOUNTER — OFFICE VISIT (OUTPATIENT)
Dept: SURGERY | Facility: CLINIC | Age: 70
End: 2020-01-29
Payer: MEDICARE

## 2020-01-29 VITALS
TEMPERATURE: 98 F | BODY MASS INDEX: 30.31 KG/M2 | RESPIRATION RATE: 16 BRPM | WEIGHT: 200 LBS | DIASTOLIC BLOOD PRESSURE: 70 MMHG | SYSTOLIC BLOOD PRESSURE: 132 MMHG | HEIGHT: 68 IN | HEART RATE: 70 BPM

## 2020-01-29 DIAGNOSIS — R35.1 NOCTURIA: ICD-10-CM

## 2020-01-29 DIAGNOSIS — Z87.442 HISTORY OF KIDNEY STONES: ICD-10-CM

## 2020-01-29 DIAGNOSIS — N32.0 BLADDER NECK OBSTRUCTION: ICD-10-CM

## 2020-01-29 DIAGNOSIS — N28.1 COMPLEX RENAL CYST: ICD-10-CM

## 2020-01-29 DIAGNOSIS — N40.1 BENIGN PROSTATIC HYPERPLASIA WITH WEAK URINARY STREAM: ICD-10-CM

## 2020-01-29 DIAGNOSIS — C67.2 CANCER OF LATERAL WALL OF URINARY BLADDER (HCC): Primary | ICD-10-CM

## 2020-01-29 DIAGNOSIS — N47.1 PHIMOSIS: ICD-10-CM

## 2020-01-29 DIAGNOSIS — R39.12 BENIGN PROSTATIC HYPERPLASIA WITH WEAK URINARY STREAM: ICD-10-CM

## 2020-01-29 PROCEDURE — G0463 HOSPITAL OUTPT CLINIC VISIT: HCPCS | Performed by: UROLOGY

## 2020-01-29 PROCEDURE — 52000 CYSTOURETHROSCOPY: CPT | Performed by: UROLOGY

## 2020-01-29 PROCEDURE — 99213 OFFICE O/P EST LOW 20 MIN: CPT | Performed by: UROLOGY

## 2020-01-29 RX ORDER — CIPROFLOXACIN 500 MG/1
500 TABLET, FILM COATED ORAL ONCE
Status: COMPLETED | OUTPATIENT
Start: 2020-01-29 | End: 2020-01-29

## 2020-01-29 RX ADMIN — CIPROFLOXACIN 500 MG: 500 TABLET, FILM COATED ORAL at 08:43:00

## 2020-01-29 NOTE — PROGRESS NOTES
PREOPERATIVE DIAGNOSIS:       Malignant neoplasm of lateral wall of urinary bladder ([facility] [address]                                                                          06/27/2019     POSTOP DIAGNOSIS:               The same;  No tumors, stones, or CIS of the joana 6/27/19 with pathology showing low-grade papillary urothelial carcinoma, non-invasive.  On 5/11/2019 patient had an episode of gross hematuria; follow up 6/27/2019 cystoscopy showed a 2-3 cm bladder lesion, lesion was resected and fulgurated under general a [facility]  3/22/2018 Dr. Celsa Castro (Urology); left renal colic microscopic hematuria and a CAT scan showing a 4 mm left UVJ stone; IVP that documented passage  5/15/2019 [facility] (Urology); last stone was a 4 mm left UJV stone in 20 Patient complains of nocturia 2x.      PE-- On EDDA, prostate 3.5-4+ enlarged, 45-50 grams, no palpable nodules or indurations      01/29/20  0741 01/29/20  0847   BP: 156/76 132/70   Pulse: 60 70   Resp: 16    Temp: 98.3 °F (36.8 °C)    TempSrc: Oral    Rojean Diver performed due to low volume of urothelial carcinoma; [facility] pathology confirmed the presence of non-invasive low-grade urothelial carcinoma.  5/15/2019 urine cytology was negative for high-grade urothelial carcinoma; supporting both pathology reports s significantly changed since 2017. I recommend observation of this problem; patient agrees.     (N47.1) Phimosis  Patient feels this is stable and decides to observe.      (R35.1) Nocturia  Patient complains of nocturia 2x.  He has previous AUA score of 6, m    2. If you have burning with urination, please buy \"AZO\" over the counter medication for burning - take 1 tablet every 8 hours as needed for burning pain. It makes the color of the urine bright orange red.     3.      Continue  tamsulosin 0.4 mg daily lemonade because it might wear down the enamel on your teeth, so the solution would be to drink lemonade through a straw. Try to cut back on oral liquids for 3 hours before bedtime,  so that you are not bothered by waking up at night to urinate.  Severely

## 2020-01-29 NOTE — PATIENT INSTRUCTIONS
Lorraine Boyd M.D.           1. If your urine is bloody, please drink enough liquids to dilute out the blood. If the urine doesn't show any signs of blood, you can drink your usual amount of liquids.  Once there are oxalate such as nuts, peanut butter, tea, ice tea, green tea, cocoa, spinach, dark green leafy vegetables, dark berries. Also the official recommendation is to drink 2.5 liters of water and lemonade ( 8 cups! )  per day.   Lemonade is high in the natural c

## 2020-02-07 RX ORDER — LISINOPRIL 40 MG/1
TABLET ORAL
Qty: 90 TABLET | Refills: 0 | Status: SHIPPED | OUTPATIENT
Start: 2020-02-07 | End: 2020-04-27

## 2020-02-07 NOTE — TELEPHONE ENCOUNTER
LOV; 10/22/19  LR: 10/23/19    Future Appointments   Date Time Provider Anjali Blanco   4/24/2020  8:30 AM Oralia Churchill MD [address] [place] [address]

## 2020-02-17 DIAGNOSIS — I10 ESSENTIAL HYPERTENSION: ICD-10-CM

## 2020-02-17 RX ORDER — AMLODIPINE BESYLATE 5 MG/1
TABLET ORAL
Qty: 180 TABLET | Refills: 1 | Status: SHIPPED | OUTPATIENT
Start: 2020-02-17 | End: 2020-08-10

## 2020-02-17 RX ORDER — ATENOLOL 50 MG/1
TABLET ORAL
Qty: 90 TABLET | Refills: 1 | Status: SHIPPED | OUTPATIENT
Start: 2020-02-17 | End: 2020-08-10

## 2020-02-20 NOTE — ADDENDUM NOTE
Addended by: Chirag Jiang on: 4/26/2019 08:59 AM     Modules accepted: Sandra
Dr. Dumont notified of pt's elevated Blood Glucose.

## 2020-03-19 ENCOUNTER — PATIENT MESSAGE (OUTPATIENT)
Dept: INTERNAL MEDICINE CLINIC | Facility: CLINIC | Age: 70
End: 2020-03-19

## 2020-03-20 RX ORDER — ICOSAPENT ETHYL 1000 MG/1
1 CAPSULE ORAL 2 TIMES DAILY
Qty: 180 CAPSULE | Refills: 1 | Status: SHIPPED | OUTPATIENT
Start: 2020-03-20 | End: 2020-12-17

## 2020-03-20 NOTE — TELEPHONE ENCOUNTER
From: Tacos Flores  To: Carmelita Donovan MD  Sent: 3/19/2020 4:23 PM CDT  Subject: Prescription Question    Back in November, 2019, I received a notice from [facility] that they will no longer cover 3-Omega Ethyl Esters as of 1/1/2020.  Rolanda will the

## 2020-04-15 ENCOUNTER — TELEPHONE (OUTPATIENT)
Dept: ENDOCRINOLOGY CLINIC | Facility: CLINIC | Age: 70
End: 2020-04-15

## 2020-04-20 ENCOUNTER — PATIENT MESSAGE (OUTPATIENT)
Dept: ENDOCRINOLOGY CLINIC | Facility: CLINIC | Age: 70
End: 2020-04-20

## 2020-04-20 DIAGNOSIS — E11.8 CONTROLLED TYPE 2 DIABETES MELLITUS WITH COMPLICATION, WITHOUT LONG-TERM CURRENT USE OF INSULIN (HCC): Primary | ICD-10-CM

## 2020-04-21 NOTE — TELEPHONE ENCOUNTER
From: Josie Tsang  To: Bryn Glaser MD  Sent: 4/20/2020 5:28 PM CDT  Subject: Other    Hello Dr. Prasanna Schulz.  Since I am having a virtual appointment on Friday, April 24, you will not be able to do an A1C test. I believe that I have lab tests req

## 2020-04-22 ENCOUNTER — LAB ENCOUNTER (OUTPATIENT)
Dept: LAB | Facility: HOSPITAL | Age: 70
End: 2020-04-22
Attending: UROLOGY
Payer: MEDICARE

## 2020-04-22 ENCOUNTER — TELEPHONE (OUTPATIENT)
Dept: SURGERY | Facility: CLINIC | Age: 70
End: 2020-04-22

## 2020-04-22 DIAGNOSIS — C67.2 CANCER OF LATERAL WALL OF URINARY BLADDER (HCC): ICD-10-CM

## 2020-04-22 DIAGNOSIS — Z13.29 THYROID DISORDER SCREENING: ICD-10-CM

## 2020-04-22 DIAGNOSIS — E11.8 CONTROLLED TYPE 2 DIABETES MELLITUS WITH COMPLICATION, WITHOUT LONG-TERM CURRENT USE OF INSULIN (HCC): ICD-10-CM

## 2020-04-22 PROCEDURE — 36415 COLL VENOUS BLD VENIPUNCTURE: CPT

## 2020-04-22 PROCEDURE — 83036 HEMOGLOBIN GLYCOSYLATED A1C: CPT

## 2020-04-22 PROCEDURE — 88108 CYTOPATH CONCENTRATE TECH: CPT

## 2020-04-22 PROCEDURE — 82570 ASSAY OF URINE CREATININE: CPT

## 2020-04-22 PROCEDURE — 82043 UR ALBUMIN QUANTITATIVE: CPT

## 2020-04-22 PROCEDURE — 84439 ASSAY OF FREE THYROXINE: CPT

## 2020-04-22 PROCEDURE — 84443 ASSAY THYROID STIM HORMONE: CPT

## 2020-04-22 PROCEDURE — 80061 LIPID PANEL: CPT

## 2020-04-22 NOTE — TELEPHONE ENCOUNTER
Do to COVID-19 (Coronavirus) global pandemic and shelter in place order, Dr. Malka Edge safe to postpone patient's cysto that is scheduled for Wed 4/29/2020 for 2 mos. Patient contacted. Patient rescheduled cysto to June 26.  All questions answered

## 2020-04-24 ENCOUNTER — VIRTUAL PHONE E/M (OUTPATIENT)
Dept: ENDOCRINOLOGY CLINIC | Facility: CLINIC | Age: 70
End: 2020-04-24
Payer: MEDICARE

## 2020-04-24 DIAGNOSIS — R79.89 HIGH SERUM THYROID STIMULATING HORMONE (TSH): ICD-10-CM

## 2020-04-24 DIAGNOSIS — E11.69 TYPE 2 DIABETES MELLITUS WITH OTHER SPECIFIED COMPLICATION, UNSPECIFIED WHETHER LONG TERM INSULIN USE (HCC): ICD-10-CM

## 2020-04-24 DIAGNOSIS — E78.5 DYSLIPIDEMIA: Primary | ICD-10-CM

## 2020-04-24 PROCEDURE — 99443 PHONE E/M BY PHYS 21-30 MIN: CPT | Performed by: INTERNAL MEDICINE

## 2020-04-24 RX ORDER — METFORMIN HYDROCHLORIDE 500 MG/1
1000 TABLET, EXTENDED RELEASE ORAL 2 TIMES DAILY WITH MEALS
Qty: 360 TABLET | Refills: 0 | Status: SHIPPED | OUTPATIENT
Start: 2020-04-24 | End: 2020-07-20

## 2020-04-24 NOTE — PROGRESS NOTES
Virtual Telephone Check-In    Chan Saenz verbally consents to  a Virtual/Telephone Check-In visit on 04/24/20.     Patient understands and accepts financial responsibility for any deductible, co-insurance and/or co-pays associated with this service ., Disp: 3 tablet, Rfl: 0  PRAVASTATIN SODIUM 40 MG Oral Tab, TAKE 1 TABLET BY MOUTH EVERY DAY AT NIGHT, Disp: 90 tablet, Rfl: 0  Pioglitazone HCl 30 MG Oral Tab, TAKE 1/2 TAB WITH BREAKFAST AND 1/2 TAB WITH DINNER, Disp: , Rfl: 1  tamsulosin HCl 0.4 glucometer on next visit. f). Life style changes discussed  g). Hypoglycemia recognition and management discussed     2.  Dyslipidemia  A) Discussed lifestyle modifications including reductions in dietary total and saturated fat, weight loss, aerobic exerc MD [General Appearance - Well Developed] : well developed [Normal Appearance] : normal appearance [Well Groomed] : well groomed [General Appearance - Well Nourished] : well nourished [No Deformities] : no deformities [General Appearance - In No Acute Distress] : no acute distress [Normal Conjunctiva] : the conjunctiva exhibited no abnormalities [Eyelids - No Xanthelasma] : the eyelids demonstrated no xanthelasmas [No Oral Pallor] : no oral pallor [No Oral Cyanosis] : no oral cyanosis [] : no respiratory distress [Respiration, Rhythm And Depth] : normal respiratory rhythm and effort [Exaggerated Use Of Accessory Muscles For Inspiration] : no accessory muscle use [Auscultation Breath Sounds / Voice Sounds] : lungs were clear to auscultation bilaterally [Heart Rate And Rhythm] : heart rate and rhythm were normal [Heart Sounds] : normal S1 and S2 [Murmurs] : no murmurs present [Arterial Pulses Normal] : the arterial pulses were normal [Edema] : no peripheral edema present [Veins - Varicosity Changes] : no varicosital changes were noted in the lower extremities [FreeTextEntry1] : no gallop/rub/heave/click, normal PMI. no bruits [Abdomen Soft] : soft [Abdomen Tenderness] : non-tender [Abnormal Walk] : normal gait [Gait - Sufficient For Exercise Testing] : the gait was sufficient for exercise testing [Nail Clubbing] : no clubbing of the fingernails [Cyanosis, Localized] : no localized cyanosis [Skin Color & Pigmentation] : normal skin color and pigmentation [Oriented To Time, Place, And Person] : oriented to person, place, and time [Affect] : the affect was normal [Mood] : the mood was normal [No Anxiety] : not feeling anxious

## 2020-04-27 RX ORDER — LISINOPRIL 40 MG/1
TABLET ORAL
Qty: 90 TABLET | Refills: 0 | Status: SHIPPED | OUTPATIENT
Start: 2020-04-27 | End: 2020-07-23

## 2020-05-13 ENCOUNTER — TELEMEDICINE (OUTPATIENT)
Dept: INTERNAL MEDICINE CLINIC | Facility: CLINIC | Age: 70
End: 2020-05-13

## 2020-05-13 VITALS — WEIGHT: 200 LBS | SYSTOLIC BLOOD PRESSURE: 132 MMHG | DIASTOLIC BLOOD PRESSURE: 65 MMHG | BODY MASS INDEX: 30 KG/M2

## 2020-05-13 DIAGNOSIS — F41.9 ANXIETY: ICD-10-CM

## 2020-05-13 DIAGNOSIS — E78.5 HYPERLIPIDEMIA, UNSPECIFIED HYPERLIPIDEMIA TYPE: ICD-10-CM

## 2020-05-13 DIAGNOSIS — I10 ESSENTIAL HYPERTENSION: Primary | ICD-10-CM

## 2020-05-13 DIAGNOSIS — E11.9 TYPE 2 DIABETES MELLITUS WITHOUT COMPLICATION, WITHOUT LONG-TERM CURRENT USE OF INSULIN (HCC): ICD-10-CM

## 2020-05-13 DIAGNOSIS — C67.9 MALIGNANT NEOPLASM OF URINARY BLADDER, UNSPECIFIED SITE (HCC): ICD-10-CM

## 2020-05-13 DIAGNOSIS — Z12.5 SCREENING FOR PROSTATE CANCER: ICD-10-CM

## 2020-05-13 PROCEDURE — 99443 PHONE E/M BY PHYS 21-30 MIN: CPT | Performed by: INTERNAL MEDICINE

## 2020-05-13 RX ORDER — PRAVASTATIN SODIUM 40 MG
40 TABLET ORAL NIGHTLY
Qty: 90 TABLET | Refills: 1 | Status: SHIPPED | OUTPATIENT
Start: 2020-05-13 | End: 2020-11-02

## 2020-05-13 NOTE — PROGRESS NOTES
HPI:    Patient ID: Skyler Tuttle is a 68year old male. Virtual/Telephone Check-In    Skyler Tuttle verbally consents to a Virtual/Telephone Check-In service on 05/13/20.   Patient understands and accepts financial responsibility for any deduc [facility])    • Essential hypertension    • Hyperlipidemia    • Kidney stone    • Pyloric stenosis    • Ponemah Hunt syndrome (geniculate herpes zoster) 2016    Rx with prednisone      Past Surgical History:   Procedure Laterality Date   • COLONOSCOPY     • CYST • diazepam 10 MG Oral Tab Take 1 tablet by mouth upon arrival at OFFICE, for follow-up cystoscopies; you must have a .  3 tablet 0   • PRAVASTATIN SODIUM 40 MG Oral Tab TAKE 1 TABLET BY MOUTH EVERY DAY AT NIGHT 90 tablet 0   • Pioglitazone HCl 30 MG right now. Continue current treatment. Follow-up in 6 months for annual wellness visit. We will do full blood test at that time. Encouraged to continue on diet and exercise. Stay safe during the ankle but epidemic.     2. Hyperlipidemia, unspecified hy

## 2020-06-26 ENCOUNTER — PROCEDURE (OUTPATIENT)
Dept: SURGERY | Facility: CLINIC | Age: 70
End: 2020-06-26
Payer: MEDICARE

## 2020-06-26 VITALS — HEART RATE: 63 BPM | DIASTOLIC BLOOD PRESSURE: 74 MMHG | SYSTOLIC BLOOD PRESSURE: 140 MMHG | TEMPERATURE: 98 F

## 2020-06-26 DIAGNOSIS — N28.1 COMPLEX RENAL CYST: ICD-10-CM

## 2020-06-26 DIAGNOSIS — C67.2 MALIGNANT NEOPLASM OF LATERAL WALL OF URINARY BLADDER (HCC): Primary | ICD-10-CM

## 2020-06-26 DIAGNOSIS — R35.1 NOCTURIA: ICD-10-CM

## 2020-06-26 DIAGNOSIS — N47.1 PHIMOSIS: ICD-10-CM

## 2020-06-26 DIAGNOSIS — N32.0 BLADDER NECK OBSTRUCTION: ICD-10-CM

## 2020-06-26 DIAGNOSIS — R39.12 BENIGN PROSTATIC HYPERPLASIA WITH WEAK URINARY STREAM: ICD-10-CM

## 2020-06-26 DIAGNOSIS — Z12.5 PROSTATE CANCER SCREENING: ICD-10-CM

## 2020-06-26 DIAGNOSIS — N40.1 BENIGN PROSTATIC HYPERPLASIA WITH WEAK URINARY STREAM: ICD-10-CM

## 2020-06-26 DIAGNOSIS — Z87.442 HISTORY OF KIDNEY STONES: ICD-10-CM

## 2020-06-26 PROCEDURE — 52000 CYSTOURETHROSCOPY: CPT | Performed by: UROLOGY

## 2020-06-26 PROCEDURE — 99213 OFFICE O/P EST LOW 20 MIN: CPT | Performed by: UROLOGY

## 2020-06-26 PROCEDURE — G0463 HOSPITAL OUTPT CLINIC VISIT: HCPCS | Performed by: UROLOGY

## 2020-06-26 RX ORDER — CIPROFLOXACIN 500 MG/1
500 TABLET, FILM COATED ORAL ONCE
Status: COMPLETED | OUTPATIENT
Start: 2020-06-26 | End: 2020-06-26

## 2020-06-26 RX ADMIN — CIPROFLOXACIN 500 MG: 500 TABLET, FILM COATED ORAL at 14:00:00

## 2020-06-26 NOTE — PROGRESS NOTES
PREOPERATIVE DIAGNOSIS:     Malignant neoplasm of lateral wall of urinary bladder 06/27/2019     POSTOP DIAGNOSIS:               The same;  No tumors, stones, or CIS of the bladder or bladder neck    PROCEDURE:              Cystoscopy              ANESTHESI lesion, lesion was resected and fulgurated under general anesthesia and pathology showed low-grade papillary urothelial carcinoma, non-invasive.  Pathology originally performed through [facility], second opinion pathology with the [facility] was performed due to (Urology); left renal colic microscopic hematuria and a CAT scan showing a 4 mm left UVJ stone; IVP that documented passage  5/15/2019 [facility] (Urology); last stone was a 4 mm left UJV stone in 2017 that he was able to pass on his own; CT urog nodules or indurations; Continue tamsulosin 0.4 mg daily       ROS--Patient denies CP or SOB    PE-- I did not examine prostate today, however on 01/29/2020 EDDA, prostate 3.5-4+ enlarged, 45-50 grams, no palpable nodules or indurations     06/26/20  0153 and fulgurated under general anesthesia; pathology showed showed low-grade papillary urothelial carcinoma, non-invasive.  Pathology originally performed through [address] [place] [address], second opinion pathology with the [facility] was performed due to low volume of urothelial may reflect hemorrhagic or proteinaeous cyst and is not significantly changed since 2017. Patient feels this is stable and chooses to continue observation.      (N47.1) Phimosis  We discussed circumcision vs observation.  I fully explained to patient the be daily aspirin or other anti-coagulant medication unless if we tell you otherwise.      2. If you have burning with urination, please buy \"AZO\" over the counter medication for burning - take 1 tablet every 8 hours as needed for burning pain.  It makes the in the natural chemical citrate  which in general lowers probability of forming kidney stones.  Your dentist may be upset with you drinking lemonade because it might wear down the enamel on your teeth, so the solution would be to drink lemonade through a s applicable) and agree that the record reflects my personal performance and is accurate and complete.   Donna Laurent MD, 6/26/2020, 5:02 PM

## 2020-06-26 NOTE — PATIENT INSTRUCTIONS
Dulce Maria Garcia M.D.           1. If your urine is bloody, please drink enough liquids to dilute out the blood. If the urine doesn't show any signs of blood, you can drink your usual amount of liquids.  Once there ar ask the   to prepare a low-salt meal.  Limit foods high in oxalate such as nuts, peanut butter, tea, ice tea, green tea, cocoa, spinach, dark green leafy vegetables, dark berries.  Also the official recommendation is to drink 2.5 liters of water and le

## 2020-06-30 ENCOUNTER — OFFICE VISIT (OUTPATIENT)
Dept: OTOLARYNGOLOGY | Facility: CLINIC | Age: 70
End: 2020-06-30
Payer: MEDICARE

## 2020-06-30 VITALS
DIASTOLIC BLOOD PRESSURE: 69 MMHG | SYSTOLIC BLOOD PRESSURE: 137 MMHG | TEMPERATURE: 98 F | HEIGHT: 68 IN | WEIGHT: 202 LBS | BODY MASS INDEX: 30.62 KG/M2

## 2020-06-30 DIAGNOSIS — H61.23 BILATERAL IMPACTED CERUMEN: Primary | ICD-10-CM

## 2020-06-30 PROCEDURE — 69210 REMOVE IMPACTED EAR WAX UNI: CPT | Performed by: OTOLARYNGOLOGY

## 2020-06-30 NOTE — PROGRESS NOTES
Miladys Miramontes is a 68year old male. Patient presents with:  Ear Wax: both ears      HISTORY OF PRESENT ILLNESS  He presents with a history of ear pain and discomfort about 4 or 5 days ago.  He was seen in immediate care and started on Augmentin and status:       Spouse name: Not on file      Number of children: Not on file      Years of education: Not on file      Highest education level: Not on file    Tobacco Use      Smoking status: Never Smoker      Smokeless tobacco: Never Used    Substan gland - Normal. Thyroid gland - Normal.             Head/Face Normal Facial features - Normal. Eyebrows - Normal. Skull - Normal.        Nose   deviated septum to the left   Ears Normal Inspection - Right: Normal, Left: Normal. Canal - Right: Normal, Left: total) by mouth daily.  Take 1/2 hour following the same meal each day, Disp: 30 capsule, Rfl: 11  •  FREESTYLE LANCETS Does not apply Misc, CHECK BLOOD SUGAR DAILY, Disp: 100 each, Rfl: 1  •  Glucose Blood (FREESTYLE LITE TEST) In Vitro Strip, CHECK BLOOD

## 2020-07-20 RX ORDER — METFORMIN HYDROCHLORIDE 500 MG/1
TABLET, EXTENDED RELEASE ORAL
Qty: 360 TABLET | Refills: 0 | Status: SHIPPED | OUTPATIENT
Start: 2020-07-20 | End: 2020-08-04

## 2020-07-23 RX ORDER — LISINOPRIL 40 MG/1
TABLET ORAL
Qty: 90 TABLET | Refills: 0 | Status: SHIPPED | OUTPATIENT
Start: 2020-07-23 | End: 2020-08-04

## 2020-07-24 RX ORDER — PIOGLITAZONEHYDROCHLORIDE 30 MG/1
TABLET ORAL
Qty: 90 TABLET | Refills: 0 | Status: SHIPPED | OUTPATIENT
Start: 2020-07-24 | End: 2020-09-29

## 2020-07-27 ENCOUNTER — TELEPHONE (OUTPATIENT)
Dept: INTERNAL MEDICINE CLINIC | Facility: CLINIC | Age: 70
End: 2020-07-27

## 2020-07-27 NOTE — TELEPHONE ENCOUNTER
Patient has labs to do for endocrinology and would like to complete any necessary labs for his upcoming 8/12 appointment at the same time, please review chart and see what labs patient would need for this medication review and order them

## 2020-08-04 RX ORDER — METFORMIN HYDROCHLORIDE 500 MG/1
TABLET, EXTENDED RELEASE ORAL
Qty: 360 TABLET | Refills: 0 | Status: SHIPPED | OUTPATIENT
Start: 2020-08-04 | End: 2021-01-18

## 2020-08-04 RX ORDER — LISINOPRIL 40 MG/1
TABLET ORAL
Qty: 90 TABLET | Refills: 0 | Status: SHIPPED | OUTPATIENT
Start: 2020-08-04 | End: 2021-02-10

## 2020-08-10 DIAGNOSIS — I10 ESSENTIAL HYPERTENSION: ICD-10-CM

## 2020-08-10 RX ORDER — ATENOLOL 50 MG/1
TABLET ORAL
Qty: 90 TABLET | Refills: 1 | Status: SHIPPED | OUTPATIENT
Start: 2020-08-10 | End: 2020-08-12

## 2020-08-10 RX ORDER — AMLODIPINE BESYLATE 5 MG/1
TABLET ORAL
Qty: 180 TABLET | Refills: 1 | Status: SHIPPED | OUTPATIENT
Start: 2020-08-10 | End: 2021-02-01

## 2020-08-11 ENCOUNTER — LAB ENCOUNTER (OUTPATIENT)
Dept: LAB | Facility: HOSPITAL | Age: 70
End: 2020-08-11
Attending: INTERNAL MEDICINE
Payer: MEDICARE

## 2020-08-11 DIAGNOSIS — Z12.5 SCREENING FOR PROSTATE CANCER: ICD-10-CM

## 2020-08-11 DIAGNOSIS — E11.9 TYPE 2 DIABETES MELLITUS WITHOUT COMPLICATION, WITHOUT LONG-TERM CURRENT USE OF INSULIN (HCC): ICD-10-CM

## 2020-08-11 LAB
ALBUMIN SERPL-MCNC: 3.9 G/DL (ref 3.4–5)
ALBUMIN/GLOB SERPL: 1.1 {RATIO} (ref 1–2)
ALP LIVER SERPL-CCNC: 35 U/L (ref 45–117)
ALT SERPL-CCNC: 32 U/L (ref 16–61)
ANION GAP SERPL CALC-SCNC: 6 MMOL/L (ref 0–18)
AST SERPL-CCNC: 16 U/L (ref 15–37)
BASOPHILS # BLD AUTO: 0.04 X10(3) UL (ref 0–0.2)
BASOPHILS NFR BLD AUTO: 0.5 %
BILIRUB SERPL-MCNC: 0.6 MG/DL (ref 0.1–2)
BUN BLD-MCNC: 23 MG/DL (ref 7–18)
BUN/CREAT SERPL: 21.1 (ref 10–20)
CALCIUM BLD-MCNC: 9.1 MG/DL (ref 8.5–10.1)
CHLORIDE SERPL-SCNC: 104 MMOL/L (ref 98–112)
CHOLEST SMN-MCNC: 160 MG/DL (ref ?–200)
CO2 SERPL-SCNC: 27 MMOL/L (ref 21–32)
COMPLEXED PSA SERPL-MCNC: 3.88 NG/ML (ref ?–4)
CREAT BLD-MCNC: 1.09 MG/DL (ref 0.7–1.3)
DEPRECATED RDW RBC AUTO: 48 FL (ref 35.1–46.3)
EOSINOPHIL # BLD AUTO: 0.38 X10(3) UL (ref 0–0.7)
EOSINOPHIL NFR BLD AUTO: 5.2 %
ERYTHROCYTE [DISTWIDTH] IN BLOOD BY AUTOMATED COUNT: 13.7 % (ref 11–15)
EST. AVERAGE GLUCOSE BLD GHB EST-MCNC: 126 MG/DL (ref 68–126)
GLOBULIN PLAS-MCNC: 3.5 G/DL (ref 2.8–4.4)
GLUCOSE BLD-MCNC: 103 MG/DL (ref 70–99)
HBA1C MFR BLD HPLC: 6 % (ref ?–5.7)
HCT VFR BLD AUTO: 45.2 % (ref 39–53)
HDLC SERPL-MCNC: 41 MG/DL (ref 40–59)
HGB BLD-MCNC: 15.3 G/DL (ref 13–17.5)
IMM GRANULOCYTES # BLD AUTO: 0.03 X10(3) UL (ref 0–1)
IMM GRANULOCYTES NFR BLD: 0.4 %
LDLC SERPL CALC-MCNC: 82 MG/DL (ref ?–100)
LYMPHOCYTES # BLD AUTO: 2.51 X10(3) UL (ref 1–4)
LYMPHOCYTES NFR BLD AUTO: 34.1 %
M PROTEIN MFR SERPL ELPH: 7.4 G/DL (ref 6.4–8.2)
MCH RBC QN AUTO: 32.5 PG (ref 26–34)
MCHC RBC AUTO-ENTMCNC: 33.8 G/DL (ref 31–37)
MCV RBC AUTO: 96 FL (ref 80–100)
MONOCYTES # BLD AUTO: 0.78 X10(3) UL (ref 0.1–1)
MONOCYTES NFR BLD AUTO: 10.6 %
NEUTROPHILS # BLD AUTO: 3.63 X10 (3) UL (ref 1.5–7.7)
NEUTROPHILS # BLD AUTO: 3.63 X10(3) UL (ref 1.5–7.7)
NEUTROPHILS NFR BLD AUTO: 49.2 %
NONHDLC SERPL-MCNC: 119 MG/DL (ref ?–130)
OSMOLALITY SERPL CALC.SUM OF ELEC: 288 MOSM/KG (ref 275–295)
PATIENT FASTING Y/N/NP: YES
PATIENT FASTING Y/N/NP: YES
PLATELET # BLD AUTO: 210 10(3)UL (ref 150–450)
POTASSIUM SERPL-SCNC: 4.1 MMOL/L (ref 3.5–5.1)
RBC # BLD AUTO: 4.71 X10(6)UL (ref 3.8–5.8)
SODIUM SERPL-SCNC: 137 MMOL/L (ref 136–145)
T4 FREE SERPL-MCNC: 1.1 NG/DL (ref 0.8–1.7)
TRIGL SERPL-MCNC: 183 MG/DL (ref 30–149)
TSI SER-ACNC: 5.95 MIU/ML (ref 0.36–3.74)
VIT B12 SERPL-MCNC: 278 PG/ML (ref 193–986)
VLDLC SERPL CALC-MCNC: 37 MG/DL (ref 0–30)
WBC # BLD AUTO: 7.4 X10(3) UL (ref 4–11)

## 2020-08-11 PROCEDURE — 82607 VITAMIN B-12: CPT

## 2020-08-11 PROCEDURE — 84439 ASSAY OF FREE THYROXINE: CPT

## 2020-08-11 PROCEDURE — 84443 ASSAY THYROID STIM HORMONE: CPT

## 2020-08-11 PROCEDURE — 80053 COMPREHEN METABOLIC PANEL: CPT

## 2020-08-11 PROCEDURE — 80061 LIPID PANEL: CPT

## 2020-08-11 PROCEDURE — 83036 HEMOGLOBIN GLYCOSYLATED A1C: CPT

## 2020-08-11 PROCEDURE — 36415 COLL VENOUS BLD VENIPUNCTURE: CPT

## 2020-08-11 PROCEDURE — 85025 COMPLETE CBC W/AUTO DIFF WBC: CPT

## 2020-08-12 ENCOUNTER — OFFICE VISIT (OUTPATIENT)
Dept: INTERNAL MEDICINE CLINIC | Facility: CLINIC | Age: 70
End: 2020-08-12
Payer: MEDICARE

## 2020-08-12 VITALS
DIASTOLIC BLOOD PRESSURE: 74 MMHG | HEART RATE: 58 BPM | HEIGHT: 68 IN | WEIGHT: 201.69 LBS | BODY MASS INDEX: 30.57 KG/M2 | SYSTOLIC BLOOD PRESSURE: 148 MMHG

## 2020-08-12 DIAGNOSIS — C67.9 MALIGNANT NEOPLASM OF URINARY BLADDER, UNSPECIFIED SITE (HCC): ICD-10-CM

## 2020-08-12 DIAGNOSIS — I10 ESSENTIAL HYPERTENSION: Primary | ICD-10-CM

## 2020-08-12 DIAGNOSIS — E11.9 TYPE 2 DIABETES MELLITUS WITHOUT COMPLICATION, WITHOUT LONG-TERM CURRENT USE OF INSULIN (HCC): ICD-10-CM

## 2020-08-12 DIAGNOSIS — F41.9 ANXIETY: ICD-10-CM

## 2020-08-12 DIAGNOSIS — E78.5 HYPERLIPIDEMIA, UNSPECIFIED HYPERLIPIDEMIA TYPE: ICD-10-CM

## 2020-08-12 PROCEDURE — G0463 HOSPITAL OUTPT CLINIC VISIT: HCPCS | Performed by: INTERNAL MEDICINE

## 2020-08-12 PROCEDURE — 99214 OFFICE O/P EST MOD 30 MIN: CPT | Performed by: INTERNAL MEDICINE

## 2020-08-12 RX ORDER — ATENOLOL 100 MG/1
100 TABLET ORAL DAILY
Qty: 90 TABLET | Refills: 1 | Status: SHIPPED | OUTPATIENT
Start: 2020-08-12 | End: 2020-12-18

## 2020-08-12 NOTE — PROGRESS NOTES
HPI:    Patient ID: Elie Garcia is a 68year old male. HPI  Patient is here for follow-up on chronic medical issues as listed below. Last evaluated in May via a virtual visit. No changes made at that time.   Has since seen urology for cystoscop Never         Review of Systems   Constitutional: Negative for chills, fatigue and fever. HENT: Negative for hearing loss. Eyes: Negative for visual disturbance. Respiratory: Negative for cough and shortness of breath.     Cardiovascular: Negative fo by mouth daily as needed for Anxiety.  20 tablet 0   • latanoprost 0.005 % Ophthalmic Solution INSTILL 1 DROP INTO BOTH EYES EVERY EVEING  3     Allergies:  Tetanus Immune Glob*    UNKNOWN    Comment:Unsure of reaction from childhood             Allergy to encouraged to work harder on diet and exercise. Continue same medications. 3. Hyperlipidemia, unspecified hyperlipidemia type  Lipid profile looks good. Continue current dose of statin medication. 4. Anxiety  Well-controlled.   Continue current efrain

## 2020-08-18 ENCOUNTER — OFFICE VISIT (OUTPATIENT)
Dept: ENDOCRINOLOGY CLINIC | Facility: CLINIC | Age: 70
End: 2020-08-18
Payer: MEDICARE

## 2020-08-18 VITALS
HEART RATE: 59 BPM | HEIGHT: 68 IN | WEIGHT: 198 LBS | BODY MASS INDEX: 30.01 KG/M2 | DIASTOLIC BLOOD PRESSURE: 74 MMHG | SYSTOLIC BLOOD PRESSURE: 144 MMHG

## 2020-08-18 DIAGNOSIS — E03.8 SUBCLINICAL HYPOTHYROIDISM: ICD-10-CM

## 2020-08-18 DIAGNOSIS — E11.8 CONTROLLED TYPE 2 DIABETES MELLITUS WITH COMPLICATION, WITHOUT LONG-TERM CURRENT USE OF INSULIN (HCC): Primary | ICD-10-CM

## 2020-08-18 DIAGNOSIS — E78.5 DYSLIPIDEMIA: ICD-10-CM

## 2020-08-18 LAB
GLUCOSE BLOOD: 208
TEST STRIP LOT #: NORMAL NUMERIC

## 2020-08-18 PROCEDURE — 36416 COLLJ CAPILLARY BLOOD SPEC: CPT | Performed by: INTERNAL MEDICINE

## 2020-08-18 PROCEDURE — 82962 GLUCOSE BLOOD TEST: CPT | Performed by: INTERNAL MEDICINE

## 2020-08-18 PROCEDURE — 99214 OFFICE O/P EST MOD 30 MIN: CPT | Performed by: INTERNAL MEDICINE

## 2020-08-18 NOTE — PROGRESS NOTES
Return Office Visit     CHIEF COMPLAINT:    DM    Dyslipidemia    HISTORY OF PRESENT ILLNESS:  Juany Quijano is a 68year old male who presents for follow up for DM.        DM HISTORY:  Diagnosed in 2000       HISTORY OF DIABETES COMPLICATIONS: :  His needed for Anxiety. 20 tablet 0   • latanoprost 0.005 % Ophthalmic Solution INSTILL 1 DROP INTO BOTH EYES EVERY EVEING  3   • diazepam 10 MG Oral Tab Take 1 tablet by mouth upon arrival at OFFICE, for follow-up cystoscopies; you must have a .  (Sandra or wheezing  CARDIOVASCULAR:  regular rate   ABDOMEN:  soft  SKIN:  no bruising or bleeding, no rashes and no lesions  EXTREMITIES: normal pulses, no edema, normal monofilament sensation b/l      DATA:         A1c 6.0 %     ASSESSMENT AND PLAN:    1.  Type

## 2020-08-24 ENCOUNTER — TELEPHONE (OUTPATIENT)
Dept: INTERNAL MEDICINE CLINIC | Facility: CLINIC | Age: 70
End: 2020-08-24

## 2020-08-24 NOTE — TELEPHONE ENCOUNTER
Action Requested: Summary for Provider     []  Critical Lab, Recommendations Needed  [] Need Additional Advice  []   FYI    []   Need Orders  [] Need Medications Sent to Pharmacy  []  Other     SUMMARY: Dr Desi Rose, patient's wife Libia Poon calling to report destin

## 2020-08-25 NOTE — TELEPHONE ENCOUNTER
I would not test at this time. Call if he develops symptoms, or if the contact ends up testing positive.  Agree with isolating himself for now

## 2020-09-16 ENCOUNTER — OFFICE VISIT (OUTPATIENT)
Dept: INTERNAL MEDICINE CLINIC | Facility: CLINIC | Age: 70
End: 2020-09-16
Payer: MEDICARE

## 2020-09-16 VITALS
BODY MASS INDEX: 30.46 KG/M2 | HEIGHT: 68 IN | WEIGHT: 201 LBS | RESPIRATION RATE: 20 BRPM | HEART RATE: 56 BPM | DIASTOLIC BLOOD PRESSURE: 74 MMHG | SYSTOLIC BLOOD PRESSURE: 128 MMHG

## 2020-09-16 DIAGNOSIS — I10 ESSENTIAL HYPERTENSION: ICD-10-CM

## 2020-09-16 DIAGNOSIS — Z23 NEED FOR VACCINATION: Primary | ICD-10-CM

## 2020-09-16 PROCEDURE — 90662 IIV NO PRSV INCREASED AG IM: CPT | Performed by: NURSE PRACTITIONER

## 2020-09-16 PROCEDURE — G0463 HOSPITAL OUTPT CLINIC VISIT: HCPCS | Performed by: NURSE PRACTITIONER

## 2020-09-16 PROCEDURE — G0008 ADMIN INFLUENZA VIRUS VAC: HCPCS | Performed by: NURSE PRACTITIONER

## 2020-09-16 PROCEDURE — 99213 OFFICE O/P EST LOW 20 MIN: CPT | Performed by: NURSE PRACTITIONER

## 2020-09-16 NOTE — ASSESSMENT & PLAN NOTE
A/P 75-year-old male with a history of hypertension and diabetes. He is following with Dr. Kianna Galdamez for his diabetes and is eliminated one medication (Actos) by being compliant with his diet.   He takes atenolol 50 mg twice a day and amlodipine 5 mg twice

## 2020-09-16 NOTE — PROGRESS NOTES
HPI:    Patient ID: Lv Wilson is a 68year old male. HPI Hypertension and Diabetes. Patient is here for a follow up on his B/P.  78-year-old male here for blood pressure check. The blood pressure is 128/74.   He also brought in readings of ho Psychiatric/Behavioral: The patient is not nervous/anxious. Current Outpatient Medications   Medication Sig Dispense Refill   • atenolol 100 MG Oral Tab Take 1 tablet (100 mg total) by mouth daily.  90 tablet 1   • AMLODIPINE BESYLATE 5 MG Ora List Items Addressed This Visit        Cardiovascular    Essential hypertension     A/P 68-year-old male with a history of hypertension and diabetes.   He is following with Dr. Marco Antonio Alba for his diabetes and is eliminated one medication (Actos) by being comp

## 2020-09-29 ENCOUNTER — OFFICE VISIT (OUTPATIENT)
Dept: OTOLARYNGOLOGY | Facility: CLINIC | Age: 70
End: 2020-09-29
Payer: MEDICARE

## 2020-09-29 VITALS
WEIGHT: 201 LBS | SYSTOLIC BLOOD PRESSURE: 135 MMHG | BODY MASS INDEX: 30.46 KG/M2 | TEMPERATURE: 98 F | HEIGHT: 68 IN | DIASTOLIC BLOOD PRESSURE: 66 MMHG

## 2020-09-29 DIAGNOSIS — H61.23 BILATERAL IMPACTED CERUMEN: Primary | ICD-10-CM

## 2020-09-29 PROCEDURE — 69210 REMOVE IMPACTED EAR WAX UNI: CPT | Performed by: OTOLARYNGOLOGY

## 2020-09-29 NOTE — PROGRESS NOTES
Miguelangel Garcia is a 68year old male. Patient presents with:  Cerumen Impaction: earwax both ears      HISTORY OF PRESENT ILLNESS  He presents with a history of ear pain and discomfort about 4 or 5 days ago.  He was seen in immediate care and started cerumen removal. No other complaints or concerns at this time    Social History    Socioeconomic History      Marital status:       Spouse name: Not on file      Number of children: Not on file      Years of education: Not on file      Highest educa 30.56 kg/m²        Constitutional Normal Overall appearance - Normal.        Neck Exam Normal Inspection - Normal. Palpation - Normal. Parotid gland - Normal. Thyroid gland - Normal.             Head/Face Normal Facial features - Normal. Eyebrows - Normal. Disp: 100 each, Rfl: 1  •  Glucose Blood (FREESTYLE LITE TEST) In Vitro Strip, CHECK BLOOD SUGAR DAILY, Disp: 100 strip, Rfl: 1  •  ALPRAZolam 0.25 MG Oral Tab, Take 1 tablet (0.25 mg total) by mouth daily as needed for Anxiety. , Disp: 20 tablet, Rfl: 0  •

## 2020-10-16 ENCOUNTER — PATIENT MESSAGE (OUTPATIENT)
Dept: SURGERY | Facility: CLINIC | Age: 70
End: 2020-10-16

## 2020-10-16 RX ORDER — PIOGLITAZONEHYDROCHLORIDE 30 MG/1
TABLET ORAL
Qty: 90 TABLET | Refills: 1 | Status: SHIPPED | OUTPATIENT
Start: 2020-10-16 | End: 2021-01-18

## 2020-10-16 NOTE — TELEPHONE ENCOUNTER
From: Khanh Angel  To: America Sanchez MD  Sent: 10/16/2020 10:28 AM CDT  Subject: Other    Hello Dr. Sidra Downing. In preparation for previous cystoscopies, you requested that I stop taking 3-omega. .. Warren Sherman Warren Sherman Warren Sherman fish pill.  I am now taking Vascepa in place

## 2020-10-16 NOTE — TELEPHONE ENCOUNTER
I sent patient the following message by means of \"my chart\" =  \"    To: Tacos Flores      From: Santiago Milan MD      Created: 10/16/2020 4:02 PM        Rob,  Since Vascepa is an omega-3 fatty acid, it would be best to hold this for 7 da

## 2020-10-21 ENCOUNTER — LAB ENCOUNTER (OUTPATIENT)
Dept: LAB | Facility: HOSPITAL | Age: 70
End: 2020-10-21
Attending: UROLOGY
Payer: MEDICARE

## 2020-10-21 DIAGNOSIS — C67.2 MALIGNANT NEOPLASM OF LATERAL WALL OF URINARY BLADDER (HCC): ICD-10-CM

## 2020-10-21 PROCEDURE — 88108 CYTOPATH CONCENTRATE TECH: CPT

## 2020-10-22 ENCOUNTER — OFFICE VISIT (OUTPATIENT)
Dept: OTOLARYNGOLOGY | Facility: CLINIC | Age: 70
End: 2020-10-22
Payer: MEDICARE

## 2020-10-22 VITALS
DIASTOLIC BLOOD PRESSURE: 80 MMHG | HEIGHT: 68 IN | SYSTOLIC BLOOD PRESSURE: 155 MMHG | WEIGHT: 201 LBS | TEMPERATURE: 97 F | BODY MASS INDEX: 30.46 KG/M2

## 2020-10-22 DIAGNOSIS — H61.23 BILATERAL IMPACTED CERUMEN: Primary | ICD-10-CM

## 2020-10-22 PROCEDURE — 69210 REMOVE IMPACTED EAR WAX UNI: CPT | Performed by: OTOLARYNGOLOGY

## 2020-10-22 NOTE — PROGRESS NOTES
Magdalena De Guzman is a 68year old male. Patient presents with:  Ear Problem: Bilateral ear wax Impaction       HISTORY OF PRESENT ILLNESS  He presents with a history of ear pain and discomfort about 4 or 5 days ago.  He was seen in immediate care and  cerumen removal. No other complaints or concerns at this time     10/22/2020 Patient presents for cerumen removal.  Use some Kleenex recently and felt that he may have pushed some cerumen open to his eardrum. Having some fullness in the left ear.   No othe pigment change and rash. Hema/Lymph Negative Easy bleeding and easy bruising.            PHYSICAL EXAM    /80 (BP Location: Right arm, Patient Position: Sitting, Cuff Size: adult)   Temp 96.7 °F (35.9 °C) (Tympanic)   Ht 5' 8\" (1.727 m)   Wt 201 lb total) by mouth nightly., Disp: 90 tablet, Rfl: 1  •  Icosapent Ethyl (VASCEPA) 1 g Oral Cap, Take 1 capsule by mouth 2 (two) times daily. , Disp: 180 capsule, Rfl: 1  •  diazepam 10 MG Oral Tab, Take 1 tablet by mouth upon arrival at OFFICE, for follow-up

## 2020-10-23 ENCOUNTER — TELEPHONE (OUTPATIENT)
Dept: SURGERY | Facility: CLINIC | Age: 70
End: 2020-10-23

## 2020-11-02 RX ORDER — PRAVASTATIN SODIUM 40 MG
TABLET ORAL
Qty: 90 TABLET | Refills: 1 | Status: SHIPPED | OUTPATIENT
Start: 2020-11-02 | End: 2021-05-21

## 2020-11-06 NOTE — TELEPHONE ENCOUNTER
Pt LOV with Dr. Daniella Love 6/26/2020 pt pharmacy requesting refill on tamsulosin if you agree please review and sign med,I copied and pasted part of PVK last note below.     3.   Continue  tamsulosin 0.4 mg daily for your voiding/urinating dysfunction/problem

## 2020-11-10 RX ORDER — TAMSULOSIN HYDROCHLORIDE 0.4 MG/1
0.4 CAPSULE ORAL DAILY
Qty: 90 CAPSULE | Refills: 3 | Status: SHIPPED | OUTPATIENT
Start: 2020-11-10 | End: 2021-04-26

## 2020-12-01 ENCOUNTER — OFFICE VISIT (OUTPATIENT)
Dept: OTOLARYNGOLOGY | Facility: CLINIC | Age: 70
End: 2020-12-01
Payer: MEDICARE

## 2020-12-01 VITALS — HEART RATE: 59 BPM | SYSTOLIC BLOOD PRESSURE: 131 MMHG | DIASTOLIC BLOOD PRESSURE: 65 MMHG

## 2020-12-01 DIAGNOSIS — H61.23 BILATERAL IMPACTED CERUMEN: Primary | ICD-10-CM

## 2020-12-01 PROCEDURE — 69210 REMOVE IMPACTED EAR WAX UNI: CPT | Performed by: OTOLARYNGOLOGY

## 2020-12-01 NOTE — PROGRESS NOTES
Magdalena De Guzman is a 65year old male.   Patient presents with:  Ear Wax: bilateral ear cleaning      HISTORY OF PRESENT ILLNESS    Patient presents for cerumen removal. No other complaints or concerns at this time    Social History    Socioeconomic His PHYSICAL EXAM    /65   Pulse 59        Constitutional Normal Overall appearance - Normal.        Neck Exam Normal Inspection - Normal. Palpation - Normal. Parotid gland - Normal. Thyroid gland - Normal.             Head/Face Normal Facial f 0  •  Icosapent Ethyl (VASCEPA) 1 g Oral Cap, Take 1 capsule by mouth 2 (two) times daily. , Disp: 180 capsule, Rfl: 1  •  ALPRAZolam 0.25 MG Oral Tab, Take 1 tablet (0.25 mg total) by mouth daily as needed for Anxiety. , Disp: 20 tablet, Rfl: 0  •  latanopr

## 2020-12-17 RX ORDER — ICOSAPENT ETHYL 1000 MG/1
CAPSULE ORAL
Qty: 180 CAPSULE | Refills: 1 | Status: SHIPPED | OUTPATIENT
Start: 2020-12-17 | End: 2021-06-13

## 2020-12-21 ENCOUNTER — PROCEDURE (OUTPATIENT)
Dept: SURGERY | Facility: CLINIC | Age: 70
End: 2020-12-21
Payer: MEDICARE

## 2020-12-21 VITALS
DIASTOLIC BLOOD PRESSURE: 75 MMHG | HEART RATE: 62 BPM | WEIGHT: 202 LBS | BODY MASS INDEX: 31 KG/M2 | SYSTOLIC BLOOD PRESSURE: 134 MMHG

## 2020-12-21 DIAGNOSIS — C67.2 MALIGNANT NEOPLASM OF LATERAL WALL OF URINARY BLADDER (HCC): Primary | ICD-10-CM

## 2020-12-21 DIAGNOSIS — R39.12 BENIGN PROSTATIC HYPERPLASIA WITH WEAK URINARY STREAM: ICD-10-CM

## 2020-12-21 DIAGNOSIS — N40.1 BENIGN PROSTATIC HYPERPLASIA WITH WEAK URINARY STREAM: ICD-10-CM

## 2020-12-21 DIAGNOSIS — R35.1 NOCTURIA: ICD-10-CM

## 2020-12-21 DIAGNOSIS — N32.0 BLADDER NECK OBSTRUCTION: ICD-10-CM

## 2020-12-21 DIAGNOSIS — N47.1 PHIMOSIS: ICD-10-CM

## 2020-12-21 DIAGNOSIS — Z87.442 HISTORY OF KIDNEY STONES: ICD-10-CM

## 2020-12-21 DIAGNOSIS — Z12.5 PROSTATE CANCER SCREENING: ICD-10-CM

## 2020-12-21 DIAGNOSIS — N28.1 COMPLEX RENAL CYST: ICD-10-CM

## 2020-12-21 PROCEDURE — 99213 OFFICE O/P EST LOW 20 MIN: CPT | Performed by: UROLOGY

## 2020-12-21 PROCEDURE — G0463 HOSPITAL OUTPT CLINIC VISIT: HCPCS | Performed by: UROLOGY

## 2020-12-21 PROCEDURE — 52000 CYSTOURETHROSCOPY: CPT | Performed by: UROLOGY

## 2020-12-21 RX ORDER — CIPROFLOXACIN 500 MG/1
500 TABLET, FILM COATED ORAL ONCE
Status: COMPLETED | OUTPATIENT
Start: 2020-12-21 | End: 2020-12-21

## 2020-12-21 RX ADMIN — CIPROFLOXACIN 500 MG: 500 TABLET, FILM COATED ORAL at 10:15:00

## 2020-12-21 NOTE — PROGRESS NOTES
PREOPERATIVE DIAGNOSIS:     Malignant neoplasm of lateral wall of urinary bladder 06/27/2019      POSTOP DIAGNOSIS:               The same;  No tumors, stones, or CIS of the bladder or bladder neck     PROCEDURE:              Cystoscopy     ANESTHESIA: separate discussion with patient concerning further treatment options of the following urological problems :    Bladder Cancer  Chronic. Problem started 05/11/2019 with episode of gross hematuria.  Diagnosed 06/27/2019 cystoscopy = 2-3 cm bladder lesion; kelly before bedtime. The patient does snore. He states he does not restrict fluids before bedtime as he drinks most of his daily fluids at dinner time.      History of Kidney stones  Patient spontaneously passed stones 1996 (in [place]) and 2006 (in [place]).  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  I fully explained to patient the benefits, risks, and alternatives to this treatment option and I answered patient's questions; patient decides against cystoscop option and I answered patient's questions; patient decides to increase tamsulosin up to 0.8 mg daily.  I advise patient to continue to restrict fluids at least 3 hours before bedtime; patient understands and agrees.      (B04.565) History of kidney stones a      6.  Please stop aspirin and NSAIDs (medications such as Advil, Motrin, Aleve, ibuprofen), fish oil pills for 7 days before procedure.     7.  Please do not take any vitamin pills for 24 hours before the procedure     8.  Please eat breakfast a daily, however, try to avoid cheese which is naturally high in salt.  Be careful not to take high doses of calcium; you may be better off taking vitamin D3 instead.  With respect to how much vitamin D3 to take, getting blood levels of vitamin D once or twi

## 2020-12-21 NOTE — PATIENT INSTRUCTIONS
Salomón Villarreal M.D.           1. If your urine is bloody, please drink enough liquids to dilute out the blood. If the urine doesn't show any signs of blood, you can drink your usual amount of liquids.  Once t  Limit foods high in oxalate such as nuts, peanut butter, tea, ice tea, green tea, cocoa, spinach, dark green leafy vegetables, dark berries.  Also the official recommendation is to drink 2.5 liters of water and lemonade ( 8 cups! )  per day. Cat Richey is

## 2020-12-31 ENCOUNTER — OFFICE VISIT (OUTPATIENT)
Dept: OPHTHALMOLOGY | Facility: CLINIC | Age: 70
End: 2020-12-31
Payer: MEDICARE

## 2020-12-31 DIAGNOSIS — H25.13 AGE-RELATED NUCLEAR CATARACT OF BOTH EYES: ICD-10-CM

## 2020-12-31 DIAGNOSIS — H40.1131 PRIMARY OPEN ANGLE GLAUCOMA OF BOTH EYES, MILD STAGE: Primary | ICD-10-CM

## 2020-12-31 DIAGNOSIS — H43.393 FLOATERS IN VISUAL FIELD, BILATERAL: ICD-10-CM

## 2020-12-31 DIAGNOSIS — H01.00A BLEPHARITIS OF UPPER AND LOWER EYELIDS OF BOTH EYES, UNSPECIFIED TYPE: ICD-10-CM

## 2020-12-31 DIAGNOSIS — H01.00B BLEPHARITIS OF UPPER AND LOWER EYELIDS OF BOTH EYES, UNSPECIFIED TYPE: ICD-10-CM

## 2020-12-31 PROCEDURE — 92250 FUNDUS PHOTOGRAPHY W/I&R: CPT | Performed by: OPHTHALMOLOGY

## 2020-12-31 PROCEDURE — 92004 COMPRE OPH EXAM NEW PT 1/>: CPT | Performed by: OPHTHALMOLOGY

## 2020-12-31 RX ORDER — LATANOPROST 50 UG/ML
SOLUTION/ DROPS OPHTHALMIC
Qty: 3 BOTTLE | Refills: 3 | Status: SHIPPED | OUTPATIENT
Start: 2020-12-31 | End: 2022-02-03

## 2020-12-31 NOTE — PATIENT INSTRUCTIONS
Primary open angle glaucoma of both eyes, mild stage  Continue Latanoprost; one drop in both eyes at bedtime. Photos taken today to document optic nerves.      We will have patient back for next available VF, OCT and PACHY with no MD then 4 months for IOP

## 2020-12-31 NOTE — PROGRESS NOTES
Suzanna Fry is a 65year old male.     HPI:     HPI     Diabetic Eye Exam      Additional comments: Pt has been a diabetic for 20-25 years       Pt's diabetes is currently controlled by pills  Pt checks BS 3-4 times a week   Pt's last blood sugar wa EVERY EVEING 3 Bottle 3   • atenolol 50 MG Oral Tab Take 1 tablet (50 mg total) by mouth 2 (two) times a day.  180 tablet 1   • VASCEPA 1 g Oral Cap TAKE 1 CAPSULE BY MOUTH TWICE A  capsule 1   • tamsulosin (FLOMAX) cap TAKE 1 CAPSULE (0.4 MG TOTAL) 2.5% Damián Synephrine @ 11:19 AM            Slit Lamp and Fundus Exam     Slit Lamp Exam       Right Left    Lids/Lashes Dermatochalasis, Meibomian gland dysfunction, 1+ Scurf Dermatochalasis, Meibomian gland dysfunction, 1+ Scurf    Conjunctiva/Sclera Temp loss of vision or curtain or veil effect. Blepharitis of upper and lower eyelids of both eyes  Patient was instructed to use warm compresses to the eyelids twice a day everyday.     Instructions for warm compress use:   Patient should place wash gilma

## 2020-12-31 NOTE — ASSESSMENT & PLAN NOTE
Continue Latanoprost; one drop in both eyes at bedtime. Photos taken today to document optic nerves.      We will have patient back for next available VF, OCT and PACHY with no MD then 4 months for IOP and Gonio

## 2021-01-13 ENCOUNTER — NURSE ONLY (OUTPATIENT)
Dept: OPHTHALMOLOGY | Facility: CLINIC | Age: 71
End: 2021-01-13
Payer: MEDICARE

## 2021-01-13 ENCOUNTER — VIRTUAL PHONE E/M (OUTPATIENT)
Dept: INTERNAL MEDICINE CLINIC | Facility: CLINIC | Age: 71
End: 2021-01-13
Payer: MEDICARE

## 2021-01-13 DIAGNOSIS — H40.1131 PRIMARY OPEN ANGLE GLAUCOMA OF BOTH EYES, MILD STAGE: ICD-10-CM

## 2021-01-13 DIAGNOSIS — R19.7 DIARRHEA, UNSPECIFIED TYPE: Primary | ICD-10-CM

## 2021-01-13 DIAGNOSIS — E11.9 TYPE 2 DIABETES MELLITUS WITHOUT COMPLICATION, WITHOUT LONG-TERM CURRENT USE OF INSULIN (HCC): ICD-10-CM

## 2021-01-13 PROCEDURE — 92133 CPTRZD OPH DX IMG PST SGM ON: CPT | Performed by: OPHTHALMOLOGY

## 2021-01-13 PROCEDURE — 99214 OFFICE O/P EST MOD 30 MIN: CPT | Performed by: INTERNAL MEDICINE

## 2021-01-13 PROCEDURE — 92083 EXTENDED VISUAL FIELD XM: CPT | Performed by: OPHTHALMOLOGY

## 2021-01-13 PROCEDURE — 76514 ECHO EXAM OF EYE THICKNESS: CPT | Performed by: OPHTHALMOLOGY

## 2021-01-13 NOTE — PROGRESS NOTES
Lupis Santacruz is a 65year old male.     HPI:     HPI     Here for a VF, OCT and PACHY with no MD.     Last edited by Alexandre Barraza O.T. on 1/13/2021  7:58 AM. (History)        Patient History:  Past Medical History:   Diagnosis Date   • Bell's pal TWICE A DAY WITH MEALS 360 tablet 0   • diazepam 10 MG Oral Tab Take 1 tablet by mouth upon arrival at OFFICE, for follow-up cystoscopies; you must have a .  3 tablet 0   • FREESTYLE LANCETS Does not apply Misc CHECK BLOOD SUGAR DAILY 100 each 1   • G

## 2021-01-13 NOTE — ASSESSMENT & PLAN NOTE
Abnormal OCT, both eyes. Abnormal visual field, right eye. Normal visual field, left eye. Continue Latanoprost, 1 drop, both eyes, at bedtime.

## 2021-01-13 NOTE — PROGRESS NOTES
HPI:    Patient ID: Kaveh Sam is a 65year old male. Virtual/Telephone Check-In    Kaveh Sam verbally consents to a Virtual/Telephone Check-In service on 01/13/21.   Patient has been referred to the [facility] website at [email or web address] RESECTION BLADDER TUMOR N/A 2019    Performed by Ana Pascual MD at [facility] MAIN OR   • TONSILLECTOMY        Family History   Problem Relation Age of Onset   • Other (Other) Father          of stroke   • Other (Other) Mother         Alzheimer's Allergies:  Tetanus Immune Glob*    UNKNOWN    Comment:Unsure of reaction from childhood             Allergy to horse hair/serum. PHYSICAL EXAM:   There were no vitals taken for this visit.      Physical Exam    Constitutional: He appears well-devel

## 2021-01-17 ENCOUNTER — PATIENT MESSAGE (OUTPATIENT)
Dept: ENDOCRINOLOGY CLINIC | Facility: CLINIC | Age: 71
End: 2021-01-17

## 2021-01-18 ENCOUNTER — TELEMEDICINE (OUTPATIENT)
Dept: ENDOCRINOLOGY CLINIC | Facility: CLINIC | Age: 71
End: 2021-01-18
Payer: MEDICARE

## 2021-01-18 DIAGNOSIS — E78.5 DYSLIPIDEMIA: ICD-10-CM

## 2021-01-18 DIAGNOSIS — E03.8 SUBCLINICAL HYPOTHYROIDISM: ICD-10-CM

## 2021-01-18 DIAGNOSIS — E11.69 TYPE 2 DIABETES MELLITUS WITH OTHER SPECIFIED COMPLICATION, WITHOUT LONG-TERM CURRENT USE OF INSULIN (HCC): Primary | ICD-10-CM

## 2021-01-18 PROCEDURE — 99214 OFFICE O/P EST MOD 30 MIN: CPT | Performed by: INTERNAL MEDICINE

## 2021-01-18 NOTE — PROGRESS NOTES
Telehealth outside of [address] Verbal Consent   I conducted a telehealth visit with Miguelangel Garcia today, 01/18/21, which was completed using two-way, real-time interactive audio and video communication.  This has been done in good davonte to No  History of Nephropathy: No    ASSOCIATED COMPLICATIONS:    HTN: Yes  Hyperlipidemia: Yes  Coronary Artery Disease:  No  Cerebrovascular Disease: No      HOME GLUCOSE READINGS:   Has been checking on a regular basis  Since stopping mtf yris cruz marked as reviewed. See past social history marked as reviewed.     ASSESSMENTS:     REVIEW OF SYSTEMS:  Constitutional: Negative for: weight change, fever, fatigue, cold/heat intolerance  Eyes: Negative for:  Visual changes, proptosis, blurring  ENT: [place] Medications:  Monitor and send Bg as discussed  If Bg go up, can consider SAENZ or GLP agonist    SGLT2 inhibitors and actos: will valid given h/o bladder cancer      b). No Nephropathy. c). Instructed on importance of annual eye exams. d).  Foot exam: Robina

## 2021-01-19 ENCOUNTER — PATIENT MESSAGE (OUTPATIENT)
Dept: OPHTHALMOLOGY | Facility: CLINIC | Age: 71
End: 2021-01-19

## 2021-01-19 NOTE — TELEPHONE ENCOUNTER
From: Manan Webb  To: Torsten Xie MD  Sent: 1/19/2021 5:21 PM CST  Subject: Visit Julito Garcia. Thank you for your letter today.  In the letter, you scheduled an appointment for May 21 at 9:00 AM. However, after my

## 2021-01-22 RX ORDER — BLOOD-GLUCOSE METER
KIT MISCELLANEOUS
Qty: 100 STRIP | Refills: 0 | Status: SHIPPED | OUTPATIENT
Start: 2021-01-22 | End: 2021-01-25

## 2021-01-25 ENCOUNTER — PATIENT MESSAGE (OUTPATIENT)
Dept: ENDOCRINOLOGY CLINIC | Facility: CLINIC | Age: 71
End: 2021-01-25

## 2021-01-25 ENCOUNTER — LAB ENCOUNTER (OUTPATIENT)
Dept: LAB | Facility: HOSPITAL | Age: 71
End: 2021-01-25
Attending: INTERNAL MEDICINE
Payer: MEDICARE

## 2021-01-25 DIAGNOSIS — E03.8 SUBCLINICAL HYPOTHYROIDISM: ICD-10-CM

## 2021-01-25 DIAGNOSIS — E11.69 TYPE 2 DIABETES MELLITUS WITH OTHER SPECIFIED COMPLICATION, WITHOUT LONG-TERM CURRENT USE OF INSULIN (HCC): ICD-10-CM

## 2021-01-25 LAB
ANION GAP SERPL CALC-SCNC: 4 MMOL/L (ref 0–18)
BUN BLD-MCNC: 17 MG/DL (ref 7–18)
BUN/CREAT SERPL: 16.3 (ref 10–20)
CALCIUM BLD-MCNC: 9.4 MG/DL (ref 8.5–10.1)
CHLORIDE SERPL-SCNC: 106 MMOL/L (ref 98–112)
CO2 SERPL-SCNC: 28 MMOL/L (ref 21–32)
CREAT BLD-MCNC: 1.04 MG/DL
CREAT UR-SCNC: 128 MG/DL
EST. AVERAGE GLUCOSE BLD GHB EST-MCNC: 151 MG/DL (ref 68–126)
GLUCOSE BLD-MCNC: 154 MG/DL (ref 70–99)
HBA1C MFR BLD HPLC: 6.9 % (ref ?–5.7)
MICROALBUMIN UR-MCNC: 5.82 MG/DL
MICROALBUMIN/CREAT 24H UR-RTO: 45.5 UG/MG (ref ?–30)
OSMOLALITY SERPL CALC.SUM OF ELEC: 291 MOSM/KG (ref 275–295)
PATIENT FASTING Y/N/NP: YES
POTASSIUM SERPL-SCNC: 4.1 MMOL/L (ref 3.5–5.1)
SODIUM SERPL-SCNC: 138 MMOL/L (ref 136–145)
T4 FREE SERPL-MCNC: 0.9 NG/DL (ref 0.8–1.7)
TSI SER-ACNC: 4.36 MIU/ML (ref 0.36–3.74)

## 2021-01-25 PROCEDURE — 80048 BASIC METABOLIC PNL TOTAL CA: CPT

## 2021-01-25 PROCEDURE — 84443 ASSAY THYROID STIM HORMONE: CPT

## 2021-01-25 PROCEDURE — 82570 ASSAY OF URINE CREATININE: CPT

## 2021-01-25 PROCEDURE — 82043 UR ALBUMIN QUANTITATIVE: CPT

## 2021-01-25 PROCEDURE — 84439 ASSAY OF FREE THYROXINE: CPT

## 2021-01-25 PROCEDURE — 36415 COLL VENOUS BLD VENIPUNCTURE: CPT

## 2021-01-25 PROCEDURE — 83036 HEMOGLOBIN GLYCOSYLATED A1C: CPT

## 2021-01-25 RX ORDER — BLOOD-GLUCOSE METER
1 KIT MISCELLANEOUS DAILY
Qty: 100 STRIP | Refills: 0 | Status: SHIPPED | OUTPATIENT
Start: 2021-01-25 | End: 2021-01-26

## 2021-01-25 RX ORDER — LANCETS 28 GAUGE
EACH MISCELLANEOUS
Qty: 100 EACH | Refills: 1 | Status: SHIPPED | OUTPATIENT
Start: 2021-01-25 | End: 2021-03-01

## 2021-01-25 NOTE — TELEPHONE ENCOUNTER
From: Naima Chu  To: Linda Larson MD  Sent: 1/25/2021 11:53 AM CST  Subject: Visit Follow-up Question    Hi Dr. Ky Francois. I did the blood tests you requested this morning and the results are already in my records. My A1C has risen to 6. 9.

## 2021-01-26 NOTE — TELEPHONE ENCOUNTER
Pharmacy refill request - For Alternative;      •  Glucose Blood (FREESTYLE LITE TEST) In Vitro Strip, 1 strip by In Vitro route daily. , Disp: 100 strip, Rfl: 0    **PHARMACY COMMENTS:   Alternative requested - Please resend with Diagnosis Code.     Please

## 2021-01-26 NOTE — TELEPHONE ENCOUNTER
From: Josie Tsang  To: Bryn Glaser MD  Sent: 1/25/2021 6:41 PM CST  Subject: Test Results Question    Thanks for the suggestion! Here are my tracked numbers.

## 2021-01-26 NOTE — TELEPHONE ENCOUNTER
Dr. Toyin Farrell,     Please see attached BG readings from the patient. Not on medications at this time per LOV note 1/18/21  Metformin stopped due to persistent diarrhea.

## 2021-01-27 RX ORDER — BLOOD-GLUCOSE METER
1 KIT MISCELLANEOUS DAILY
Qty: 100 STRIP | Refills: 0 | Status: SHIPPED | OUTPATIENT
Start: 2021-01-27 | End: 2021-03-01

## 2021-01-30 ENCOUNTER — PATIENT MESSAGE (OUTPATIENT)
Dept: ENDOCRINOLOGY CLINIC | Facility: CLINIC | Age: 71
End: 2021-01-30

## 2021-01-30 NOTE — TELEPHONE ENCOUNTER
From: Hazel Beat  To: Georges Blakely MD  Sent: 1/30/2021 1:26 PM CST  Subject: Prescription Question    Hi Dr. Elmer Gonzalez. Let's try the Januvia. I want to keep my A1C below 7.0, and it's getting close to going above. Thanks!     Yaw Benavides

## 2021-02-01 DIAGNOSIS — I10 ESSENTIAL HYPERTENSION: ICD-10-CM

## 2021-02-01 RX ORDER — AMLODIPINE BESYLATE 5 MG/1
TABLET ORAL
Qty: 180 TABLET | Refills: 1 | Status: SHIPPED | OUTPATIENT
Start: 2021-02-01 | End: 2021-08-04

## 2021-02-06 DIAGNOSIS — Z23 NEED FOR VACCINATION: ICD-10-CM

## 2021-02-09 ENCOUNTER — IMMUNIZATION (OUTPATIENT)
Dept: LAB | Age: 71
End: 2021-02-09
Attending: HOSPITALIST
Payer: MEDICARE

## 2021-02-09 DIAGNOSIS — Z23 NEED FOR VACCINATION: Primary | ICD-10-CM

## 2021-02-09 PROCEDURE — 0001A SARSCOV2 VAC 30MCG/0.3ML IM: CPT

## 2021-02-10 RX ORDER — LISINOPRIL 40 MG/1
TABLET ORAL
Qty: 90 TABLET | Refills: 0 | Status: SHIPPED | OUTPATIENT
Start: 2021-02-10 | End: 2021-05-04

## 2021-02-10 NOTE — TELEPHONE ENCOUNTER
LOV 1/18/21  F/U 3/8/21    Linsinopril Last refilled 8/4/2020    NOTE: Please see meds Dr Anshul Russell is also prescribing

## 2021-02-19 ENCOUNTER — OFFICE VISIT (OUTPATIENT)
Dept: OPTOMETRY | Facility: CLINIC | Age: 71
End: 2021-02-19
Payer: MEDICARE

## 2021-02-19 DIAGNOSIS — H52.13 MYOPIA WITH ASTIGMATISM AND PRESBYOPIA, BILATERAL: Primary | ICD-10-CM

## 2021-02-19 DIAGNOSIS — H52.203 MYOPIA WITH ASTIGMATISM AND PRESBYOPIA, BILATERAL: Primary | ICD-10-CM

## 2021-02-19 DIAGNOSIS — H52.4 MYOPIA WITH ASTIGMATISM AND PRESBYOPIA, BILATERAL: Primary | ICD-10-CM

## 2021-02-19 PROCEDURE — 92015 DETERMINE REFRACTIVE STATE: CPT | Performed by: OPTOMETRIST

## 2021-02-19 PROCEDURE — 99211 OFF/OP EST MAY X REQ PHY/QHP: CPT | Performed by: OPTOMETRIST

## 2021-02-19 NOTE — PROGRESS NOTES
Isabella Jacome is a 65year old male. HPI:     HPI     Patient is in for a refraction. He has a difficult time seeing at distance at times--has developing cataracts.     Last edited by Dagmar Burnett, OD on 2/19/2021  1:08 PM. (History)        Patient SAME MEAL EACH DAY 90 capsule 3   • PRAVASTATIN SODIUM 40 MG Oral Tab TAKE 1 TABLET BY MOUTH EVERY DAY AT NIGHT 90 tablet 1   • diazepam 10 MG Oral Tab Take 1 tablet by mouth upon arrival at OFFICE, for follow-up cystoscopies; you must have a .  3 tab

## 2021-03-01 ENCOUNTER — PATIENT MESSAGE (OUTPATIENT)
Dept: ENDOCRINOLOGY CLINIC | Facility: CLINIC | Age: 71
End: 2021-03-01

## 2021-03-01 DIAGNOSIS — E11.69 TYPE 2 DIABETES MELLITUS WITH OTHER SPECIFIED COMPLICATION, UNSPECIFIED WHETHER LONG TERM INSULIN USE (HCC): Primary | ICD-10-CM

## 2021-03-01 RX ORDER — LANCETS 28 GAUGE
EACH MISCELLANEOUS
Qty: 100 EACH | Refills: 1 | Status: SHIPPED | OUTPATIENT
Start: 2021-03-01

## 2021-03-01 RX ORDER — BLOOD-GLUCOSE METER
1 KIT MISCELLANEOUS DAILY
Qty: 100 STRIP | Refills: 0 | Status: SHIPPED | OUTPATIENT
Start: 2021-03-01 | End: 2021-03-10

## 2021-03-01 NOTE — TELEPHONE ENCOUNTER
From: Kim Tracey  To: Naa Cardoso MD  Sent: 3/1/2021 12:44 PM CST  Subject: Prescription Question    Hi Dr. Armen Simpson    [facility] tells me that they need a diagnosis code for my current request for more test strips and lancets, or else Medicare wi

## 2021-03-02 ENCOUNTER — IMMUNIZATION (OUTPATIENT)
Dept: LAB | Age: 71
End: 2021-03-02
Attending: HOSPITALIST
Payer: MEDICARE

## 2021-03-02 DIAGNOSIS — Z23 NEED FOR VACCINATION: Primary | ICD-10-CM

## 2021-03-02 PROCEDURE — 0002A SARSCOV2 VAC 30MCG/0.3ML IM: CPT

## 2021-03-04 RX ORDER — METFORMIN HYDROCHLORIDE 500 MG/1
TABLET, EXTENDED RELEASE ORAL
Qty: 45 TABLET | Refills: 0 | Status: SHIPPED | OUTPATIENT
Start: 2021-03-04 | End: 2021-03-08

## 2021-03-04 NOTE — TELEPHONE ENCOUNTER
Pharmacy refill request for:    Metformin HCL ER 500mg Tablet  Take 2 tablets by mouth twice a day with meals. QTY:360    Please follow up, thank you.

## 2021-03-04 NOTE — TELEPHONE ENCOUNTER
LOV 1/18/21. RTC 5-6 months. F/u 3/08/21. Pended 3 month supply. Updated Rx to new instruction given 2/01/21.

## 2021-03-05 ENCOUNTER — LAB ENCOUNTER (OUTPATIENT)
Dept: LAB | Facility: HOSPITAL | Age: 71
End: 2021-03-05
Attending: INTERNAL MEDICINE
Payer: MEDICARE

## 2021-03-05 DIAGNOSIS — E11.69 TYPE 2 DIABETES MELLITUS WITH OTHER SPECIFIED COMPLICATION, UNSPECIFIED WHETHER LONG TERM INSULIN USE (HCC): ICD-10-CM

## 2021-03-05 LAB
CHOLEST SMN-MCNC: 185 MG/DL (ref ?–200)
EST. AVERAGE GLUCOSE BLD GHB EST-MCNC: 166 MG/DL (ref 68–126)
HBA1C MFR BLD HPLC: 7.4 % (ref ?–5.7)
HDLC SERPL-MCNC: 37 MG/DL (ref 40–59)
LDLC SERPL CALC-MCNC: 84 MG/DL (ref ?–100)
NONHDLC SERPL-MCNC: 148 MG/DL (ref ?–130)
PATIENT FASTING Y/N/NP: YES
TRIGL SERPL-MCNC: 318 MG/DL (ref 30–149)
VLDLC SERPL CALC-MCNC: 64 MG/DL (ref 0–30)

## 2021-03-05 PROCEDURE — 83036 HEMOGLOBIN GLYCOSYLATED A1C: CPT

## 2021-03-05 PROCEDURE — 36415 COLL VENOUS BLD VENIPUNCTURE: CPT

## 2021-03-05 PROCEDURE — 80061 LIPID PANEL: CPT

## 2021-03-07 NOTE — H&P
[facility] - Gastroenterology                                                                                                               Reason for consult: crc screening    Requesting physician or provider: [place] Abdullahi Kidney stone    • Pyloric stenosis    • Chokio Hunt syndrome (geniculate herpes zoster) 2016    Rx with prednisone      Past Surgical History:   Procedure Laterality Date   • COLONOSCOPY     • COLONOSCOPY  1996    Polyps removed, I have a colonoscopy every tablet (50 mg total) by mouth 2 (two) times a day. 180 tablet 1   • VASCEPA 1 g Oral Cap TAKE 1 CAPSULE BY MOUTH TWICE A  capsule 1   • tamsulosin (FLOMAX) cap TAKE 1 CAPSULE (0.4 MG TOTAL) BY MOUTH DAILY.  TAKE 1/2 HOUR FOLLOWING THE SAME MEAL EACH history of adenomatous colonic polyps [Z12.11, Z86.010]  * Colon polyps [K63.5]  * Diverticulosis [K57.90]  * Internal hemorrhoids [K64.8]    Procedure: Procedure(s):  COLONOSCOPY    Surgeon: Reese Jauregui - Primary    Assistants:     Operative Findings: procedure    6. You will need to be tested for COVID within 72 hours of your procedure.   You will be contacted with instructions on how to do this.       >>>Please note: if you were prescribed Suprep for the bowel prep and it is too expensive or not covere

## 2021-03-08 ENCOUNTER — OFFICE VISIT (OUTPATIENT)
Dept: ENDOCRINOLOGY CLINIC | Facility: CLINIC | Age: 71
End: 2021-03-08
Payer: MEDICARE

## 2021-03-08 VITALS
HEART RATE: 56 BPM | WEIGHT: 202 LBS | BODY MASS INDEX: 31 KG/M2 | SYSTOLIC BLOOD PRESSURE: 141 MMHG | DIASTOLIC BLOOD PRESSURE: 69 MMHG

## 2021-03-08 DIAGNOSIS — E11.69 TYPE 2 DIABETES MELLITUS WITH OTHER SPECIFIED COMPLICATION, UNSPECIFIED WHETHER LONG TERM INSULIN USE (HCC): Primary | ICD-10-CM

## 2021-03-08 DIAGNOSIS — E78.5 DYSLIPIDEMIA: ICD-10-CM

## 2021-03-08 DIAGNOSIS — E03.8 SUBCLINICAL HYPOTHYROIDISM: ICD-10-CM

## 2021-03-08 LAB
GLUCOSE BLOOD: 318
TEST STRIP LOT #: NORMAL NUMERIC

## 2021-03-08 PROCEDURE — 36416 COLLJ CAPILLARY BLOOD SPEC: CPT | Performed by: INTERNAL MEDICINE

## 2021-03-08 PROCEDURE — 82947 ASSAY GLUCOSE BLOOD QUANT: CPT | Performed by: INTERNAL MEDICINE

## 2021-03-08 PROCEDURE — 99214 OFFICE O/P EST MOD 30 MIN: CPT | Performed by: INTERNAL MEDICINE

## 2021-03-08 RX ORDER — METFORMIN HYDROCHLORIDE 500 MG/1
500 TABLET, EXTENDED RELEASE ORAL 2 TIMES DAILY WITH MEALS
Qty: 180 TABLET | Refills: 1 | Status: SHIPPED | OUTPATIENT
Start: 2021-03-08 | End: 2021-04-15

## 2021-03-08 NOTE — PROGRESS NOTES
Return Office Visit     CHIEF COMPLAINT:    DM    Dyslipidemia  Subclinical hypothyroidism      HISTORY OF PRESENT ILLNESS:  Katey Cohen is a 65year old male who presents for follow up for DM.        DM HISTORY:  Diagnosed in 2000       HISTORY O SODIUM 40 MG Oral Tab TAKE 1 TABLET BY MOUTH EVERY DAY AT NIGHT 90 tablet 1   • diazepam 10 MG Oral Tab Take 1 tablet by mouth upon arrival at OFFICE, for follow-up cystoscopies; you must have a .  3 tablet 0         ALLERGY:    Tetanus Immune Glob* intact, moving all extremities without difficulty  Psychiatric:  oriented to time, self, and place  Extremities: no obvious extremity swelling, no lesions        DATA:         A1c 7.4 % ( 3/2021)  Reviewed recent labs in detail    ASSESSMENT AND PLAN:    1

## 2021-03-09 ENCOUNTER — TELEPHONE (OUTPATIENT)
Dept: GASTROENTEROLOGY | Facility: CLINIC | Age: 71
End: 2021-03-09

## 2021-03-09 ENCOUNTER — OFFICE VISIT (OUTPATIENT)
Dept: GASTROENTEROLOGY | Facility: CLINIC | Age: 71
End: 2021-03-09
Payer: MEDICARE

## 2021-03-09 ENCOUNTER — TELEPHONE (OUTPATIENT)
Dept: ENDOCRINOLOGY CLINIC | Facility: CLINIC | Age: 71
End: 2021-03-09

## 2021-03-09 VITALS
WEIGHT: 202 LBS | DIASTOLIC BLOOD PRESSURE: 71 MMHG | BODY MASS INDEX: 30.62 KG/M2 | HEART RATE: 58 BPM | HEIGHT: 68 IN | SYSTOLIC BLOOD PRESSURE: 129 MMHG

## 2021-03-09 DIAGNOSIS — R19.7 DIARRHEA, UNSPECIFIED TYPE: Primary | ICD-10-CM

## 2021-03-09 DIAGNOSIS — Z86.010 HISTORY OF COLON POLYPS: ICD-10-CM

## 2021-03-09 PROCEDURE — 99214 OFFICE O/P EST MOD 30 MIN: CPT | Performed by: NURSE PRACTITIONER

## 2021-03-09 RX ORDER — SODIUM, POTASSIUM,MAG SULFATES 17.5-3.13G
SOLUTION, RECONSTITUTED, ORAL ORAL
Qty: 1 BOTTLE | Refills: 0 | Status: SHIPPED | OUTPATIENT
Start: 2021-03-09 | End: 2021-07-01 | Stop reason: ALTCHOICE

## 2021-03-09 NOTE — PATIENT INSTRUCTIONS
1. Schedule colonoscopy with [facility] w/ Dr. Richard Lewis (pt preference) [Diagnosis: diarrhea, h/o cln polyps]    2.  bowel prep from pharmacy (split suprep)    3.  Hold sitagliptin and metformin day before and day of procedure  Hold lisinopril day of if w/ m

## 2021-03-09 NOTE — TELEPHONE ENCOUNTER
•  Glucose Blood (FREESTYLE LITE TEST) In Vitro Strip, 1 strip by In Vitro route daily. , Disp: 100 strip, Rfl: 0    Pharmacy comments: alternative requested: patient says they test 4 times a day, can you resend script?

## 2021-03-09 NOTE — TELEPHONE ENCOUNTER
Scheduled for:  Colonoscopy 14296   Provider Name:   Dr. Richard Lewis  Date:   5/19/21  Location:     [facility]  Sedation:   MAC  Time:   11:00 am (patient knows to arrive at 10:00 am)  Prep:   Suprep  Meds/Allergies Reconciled?:  Physician reviewed Diagnosis with cods

## 2021-03-10 RX ORDER — BLOOD-GLUCOSE METER
1 KIT MISCELLANEOUS 4 TIMES DAILY
Qty: 400 STRIP | Refills: 0 | Status: SHIPPED | OUTPATIENT
Start: 2021-03-10 | End: 2021-07-23

## 2021-03-10 NOTE — TELEPHONE ENCOUNTER
Pharm calling - pt insisting to change script to testing 4 times daily. Pt is not on insulin and Medicare will not cover. Pharm states he is aware but to send anyway. RN sent per protocol.

## 2021-04-15 RX ORDER — METFORMIN HYDROCHLORIDE 500 MG/1
TABLET, EXTENDED RELEASE ORAL
Qty: 90 TABLET | Refills: 1 | Status: SHIPPED | OUTPATIENT
Start: 2021-04-15 | End: 2021-07-16

## 2021-04-21 ENCOUNTER — LAB ENCOUNTER (OUTPATIENT)
Dept: LAB | Facility: HOSPITAL | Age: 71
End: 2021-04-21
Attending: UROLOGY
Payer: MEDICARE

## 2021-04-21 DIAGNOSIS — C67.2 MALIGNANT NEOPLASM OF LATERAL WALL OF URINARY BLADDER (HCC): ICD-10-CM

## 2021-04-21 PROCEDURE — 88108 CYTOPATH CONCENTRATE TECH: CPT

## 2021-04-26 ENCOUNTER — PROCEDURE (OUTPATIENT)
Dept: SURGERY | Facility: CLINIC | Age: 71
End: 2021-04-26
Payer: MEDICARE

## 2021-04-26 VITALS
BODY MASS INDEX: 30.62 KG/M2 | WEIGHT: 202 LBS | RESPIRATION RATE: 16 BRPM | HEART RATE: 59 BPM | HEIGHT: 68 IN | SYSTOLIC BLOOD PRESSURE: 120 MMHG | DIASTOLIC BLOOD PRESSURE: 78 MMHG

## 2021-04-26 DIAGNOSIS — R35.1 NOCTURIA: ICD-10-CM

## 2021-04-26 DIAGNOSIS — C67.2 MALIGNANT NEOPLASM OF LATERAL WALL OF URINARY BLADDER (HCC): Primary | ICD-10-CM

## 2021-04-26 DIAGNOSIS — Z87.442 HISTORY OF KIDNEY STONES: ICD-10-CM

## 2021-04-26 DIAGNOSIS — N28.1 COMPLEX RENAL CYST: ICD-10-CM

## 2021-04-26 DIAGNOSIS — N40.1 BENIGN PROSTATIC HYPERPLASIA WITH WEAK URINARY STREAM: ICD-10-CM

## 2021-04-26 DIAGNOSIS — N32.0 BLADDER NECK OBSTRUCTION: ICD-10-CM

## 2021-04-26 DIAGNOSIS — N47.1 PHIMOSIS: ICD-10-CM

## 2021-04-26 DIAGNOSIS — R39.12 BENIGN PROSTATIC HYPERPLASIA WITH WEAK URINARY STREAM: ICD-10-CM

## 2021-04-26 DIAGNOSIS — Z12.5 PROSTATE CANCER SCREENING: ICD-10-CM

## 2021-04-26 PROCEDURE — 99213 OFFICE O/P EST LOW 20 MIN: CPT | Performed by: UROLOGY

## 2021-04-26 PROCEDURE — 52000 CYSTOURETHROSCOPY: CPT | Performed by: UROLOGY

## 2021-04-26 RX ORDER — TAMSULOSIN HYDROCHLORIDE 0.4 MG/1
CAPSULE ORAL
Qty: 180 CAPSULE | Refills: 3 | Status: SHIPPED | OUTPATIENT
Start: 2021-04-26 | End: 2021-08-06

## 2021-04-26 RX ORDER — DIAZEPAM 10 MG/1
TABLET ORAL
Qty: 1 TABLET | Refills: 0 | Status: SHIPPED | OUTPATIENT
Start: 2021-04-26 | End: 2021-07-01 | Stop reason: ALTCHOICE

## 2021-04-26 RX ORDER — CIPROFLOXACIN 500 MG/1
500 TABLET, FILM COATED ORAL ONCE
Status: COMPLETED | OUTPATIENT
Start: 2021-04-26 | End: 2021-04-26

## 2021-04-26 RX ADMIN — CIPROFLOXACIN 500 MG: 500 TABLET, FILM COATED ORAL at 12:06:00

## 2021-04-26 NOTE — PROGRESS NOTES
PREOPERATIVE DIAGNOSIS:     Malignant neoplasm of lateral wall of urinary bladder 06/27/2019      POSTOP DIAGNOSIS:                    The same;  No tumors, stones, or CIS of the bladder or bladder neck      PROCEDURE:              Cystoscopy      ANESTHESI lesion; lesion resected and fulgurated under general anesthesia; pathology = low-grade papillary urothelial carcinoma, non-invasive.  Pathology originally performed through [address] [place] [address], second opinion pathology with the [facility] was performed due to low volume o kidney stones.  Patient feels this is stable as he is currently asymptomatic.          So that we could have a meaningful and appropriate discussion with patient concerning further treatment options, I review with patient the past urological history and I r agent given; no obturator nerve spasm whatsoever during the case; tumor is only a few millimeters high and spreading horizontally; bladder wall is thin; rest of it was destroyed with fulguration using the bipolar loop  6/27/2019 pathology bladder tumor = l carcinoma  05/15/2019 urine cytology = negative for high-grade urothelial carcinoma; UA WBC = 2, RBC = 2  05/11/2019 urine culture = no growth at 18-24 hours   04/24/2019 PSA = 3.07         IMAGING  05/22/2019 CT urogram = stable 4.2 cm slightly hyperatten decides against cystoscopy under general anesthesia and chooses to follow up in 5 months with urine cytology followed by office cystoscopy under local anesthesia.      (N40.1,  R39.12) Benign prostatic hyperplasia with weak urinary stream   Chronic. 12/21/    (R35.1) Nocturia  Chronic. Patient had previous AUA score of 13, moderate voiding dysfunction category; nocturia 2x on 12/21/2020 per chart review. He is currently taking tamsulosin 0.4 mg daily; denies side effectcs.  We discussed continuing tamsulosi at our  for the above procedure; you must have a      5.  Please stop aspirin and NSAIDs (medications such as Advil, Motrin, Aleve, ibuprofen), fish oil pills for 7 days before procedure.     6.  Please do not take any vitamin pills for 24 restricting calcium in the diet may actually make things worse and we do want you to take in some calcium in your diet, up to 1,200 mg a day; rule of thumb--you may have 2 servings or possibly up to 3 servings of dairy daily, however, try to avoid cheese w AM

## 2021-04-26 NOTE — PATIENT INSTRUCTIONS
Sloane Noland M.D.           1. . If you have burning with urination, please buy \"AZO\" over the counter medication for burning - take 1 tablet every 8 hours as needed for burning pain.  It makes the color of t is high in the natural chemical citrate  which in general lowers probability of forming kidney stones.  Your dentist may be upset with you drinking lemonade because it might wear down the enamel on your teeth, so the solution would be to drink lemonade thr

## 2021-05-04 ENCOUNTER — OFFICE VISIT (OUTPATIENT)
Dept: OTOLARYNGOLOGY | Facility: CLINIC | Age: 71
End: 2021-05-04
Payer: MEDICARE

## 2021-05-04 VITALS
DIASTOLIC BLOOD PRESSURE: 70 MMHG | WEIGHT: 202 LBS | HEIGHT: 68 IN | TEMPERATURE: 97 F | BODY MASS INDEX: 30.62 KG/M2 | SYSTOLIC BLOOD PRESSURE: 128 MMHG

## 2021-05-04 DIAGNOSIS — H61.23 BILATERAL IMPACTED CERUMEN: Primary | ICD-10-CM

## 2021-05-04 PROCEDURE — 69210 REMOVE IMPACTED EAR WAX UNI: CPT | Performed by: OTOLARYNGOLOGY

## 2021-05-04 RX ORDER — LISINOPRIL 40 MG/1
TABLET ORAL
Qty: 90 TABLET | Refills: 0 | Status: SHIPPED | OUTPATIENT
Start: 2021-05-04 | End: 2021-07-28

## 2021-05-04 NOTE — PROGRESS NOTES
Chan Saenz is a 65year old male. Patient presents with:  Ear Wax: pt presents today w/ chelsea ear cleaning.        HISTORY OF PRESENT ILLNESS    Patient presents for cerumen removal. No other complaints or concerns at this time    Social History intolerance and heat intolerance. Neuro Negative Tremors. Psych Negative Anxiety and depression. Integumentary Negative Frequent skin infections, pigment change and rash. Hema/Lymph Negative Easy bleeding and easy bruising.            PHYSICAL EXAM Rfl: 0  •  FreeStyle Lancets Does not apply Misc, CHECK BLOOD SUGAR DAILY, Disp: 100 each, Rfl: 1  •  LISINOPRIL 40 MG Oral Tab, TAKE 1 TABLET BY MOUTH EVERY DAY, Disp: 90 tablet, Rfl: 0  •  AMLODIPINE BESYLATE 5 MG Oral Tab, TAKE 1 TABLET BY MOUTH TWICE A

## 2021-05-12 ENCOUNTER — TELEPHONE (OUTPATIENT)
Dept: OPHTHALMOLOGY | Facility: CLINIC | Age: 71
End: 2021-05-12

## 2021-05-14 ENCOUNTER — PATIENT MESSAGE (OUTPATIENT)
Dept: ENDOCRINOLOGY CLINIC | Facility: CLINIC | Age: 71
End: 2021-05-14

## 2021-05-15 NOTE — TELEPHONE ENCOUNTER
From: Chan Saenz  To: Kelley Figueroa MD  Sent: 5/14/2021 4:08 PM CDT  Subject: Other    I am having a colonoscopy on Wednesday, May 19. On the preparation paperwork, I am to check my glucose before meals on preparation day and procedure day.  The

## 2021-05-16 ENCOUNTER — LAB ENCOUNTER (OUTPATIENT)
Dept: LAB | Facility: HOSPITAL | Age: 71
End: 2021-05-16
Attending: INTERNAL MEDICINE
Payer: MEDICARE

## 2021-05-16 DIAGNOSIS — Z01.818 PRE-OP TESTING: ICD-10-CM

## 2021-05-18 ENCOUNTER — OFFICE VISIT (OUTPATIENT)
Dept: OPHTHALMOLOGY | Facility: CLINIC | Age: 71
End: 2021-05-18
Payer: MEDICARE

## 2021-05-18 DIAGNOSIS — H40.1131 PRIMARY OPEN ANGLE GLAUCOMA OF BOTH EYES, MILD STAGE: Primary | ICD-10-CM

## 2021-05-18 PROCEDURE — 99213 OFFICE O/P EST LOW 20 MIN: CPT | Performed by: OPHTHALMOLOGY

## 2021-05-18 PROCEDURE — 92020 GONIOSCOPY: CPT | Performed by: OPHTHALMOLOGY

## 2021-05-18 NOTE — PROGRESS NOTES
Skyler Tuttle is a 65year old male. HPI:     HPI     Patient is here for an IOP check and gonioscopy. He is taking Latanoprost OU at bedtime as directed. Patient recently updated his glasses with an Rx from Dr. Abi Sanon.       Last edited by [employer] Bottle 0   • SITagliptin Phosphate 100 MG Oral Tab Take 1 tablet (100 mg total) by mouth daily.  90 tablet 0   • FreeStyle Lancets Does not apply Misc CHECK BLOOD SUGAR DAILY 100 each 1   • AMLODIPINE BESYLATE 5 MG Oral Tab TAKE 1 TABLET BY MOUTH TWICE A DA Anterior Chamber Deep and quiet Deep and quiet    Iris No transillumination defects  No transillumination defects          Fundus Exam       Right Left    Disc Sloping margin, Peripapillary atrophy Sloping margin, Peripapillary atrophy    C/D Ratio 0.8

## 2021-05-18 NOTE — PATIENT INSTRUCTIONS
Primary open angle glaucoma of both eyes, mild stage  IOP is stable. Continue Latanoprost; one drop in both eyes at bedtime. Gonioscopy completed in office today with results of open angles 360 degrees.    Discussed with patient that this type of glaucom

## 2021-05-18 NOTE — ASSESSMENT & PLAN NOTE
IOP is stable. Continue Latanoprost; one drop in both eyes at bedtime. Gonioscopy completed in office today with results of open angles 360 degrees.    Discussed with patient that this type of glaucoma is open angle  and there is no contraindication to t

## 2021-05-19 ENCOUNTER — ANESTHESIA EVENT (OUTPATIENT)
Dept: ENDOSCOPY | Facility: HOSPITAL | Age: 71
End: 2021-05-19
Payer: MEDICARE

## 2021-05-19 ENCOUNTER — HOSPITAL ENCOUNTER (OUTPATIENT)
Facility: HOSPITAL | Age: 71
Setting detail: HOSPITAL OUTPATIENT SURGERY
Discharge: HOME | End: 2021-05-19
Attending: INTERNAL MEDICINE | Admitting: INTERNAL MEDICINE
Payer: MEDICARE

## 2021-05-19 ENCOUNTER — ANESTHESIA (OUTPATIENT)
Dept: ENDOSCOPY | Facility: HOSPITAL | Age: 71
End: 2021-05-19
Payer: MEDICARE

## 2021-05-19 VITALS
SYSTOLIC BLOOD PRESSURE: 127 MMHG | RESPIRATION RATE: 12 BRPM | TEMPERATURE: 97 F | OXYGEN SATURATION: 100 % | DIASTOLIC BLOOD PRESSURE: 63 MMHG | BODY MASS INDEX: 30.62 KG/M2 | WEIGHT: 202 LBS | HEART RATE: 50 BPM | HEIGHT: 68 IN

## 2021-05-19 DIAGNOSIS — R19.7 DIARRHEA, UNSPECIFIED TYPE: ICD-10-CM

## 2021-05-19 DIAGNOSIS — Z01.818 PRE-OP TESTING: Primary | ICD-10-CM

## 2021-05-19 DIAGNOSIS — Z86.010 HISTORY OF COLON POLYPS: ICD-10-CM

## 2021-05-19 PROCEDURE — 45380 COLONOSCOPY AND BIOPSY: CPT | Performed by: INTERNAL MEDICINE

## 2021-05-19 PROCEDURE — 0DBP8ZX EXCISION OF RECTUM, VIA NATURAL OR ARTIFICIAL OPENING ENDOSCOPIC, DIAGNOSTIC: ICD-10-PCS | Performed by: INTERNAL MEDICINE

## 2021-05-19 PROCEDURE — 45385 COLONOSCOPY W/LESION REMOVAL: CPT | Performed by: INTERNAL MEDICINE

## 2021-05-19 PROCEDURE — 0DBM8ZX EXCISION OF DESCENDING COLON, VIA NATURAL OR ARTIFICIAL OPENING ENDOSCOPIC, DIAGNOSTIC: ICD-10-PCS | Performed by: INTERNAL MEDICINE

## 2021-05-19 RX ORDER — LIDOCAINE HYDROCHLORIDE 10 MG/ML
INJECTION, SOLUTION EPIDURAL; INFILTRATION; INTRACAUDAL; PERINEURAL AS NEEDED
Status: DISCONTINUED | OUTPATIENT
Start: 2021-05-19 | End: 2021-05-19 | Stop reason: SURG

## 2021-05-19 RX ORDER — NALOXONE HYDROCHLORIDE 0.4 MG/ML
80 INJECTION, SOLUTION INTRAMUSCULAR; INTRAVENOUS; SUBCUTANEOUS AS NEEDED
Status: DISCONTINUED | OUTPATIENT
Start: 2021-05-19 | End: 2021-05-19

## 2021-05-19 RX ORDER — SODIUM CHLORIDE, SODIUM LACTATE, POTASSIUM CHLORIDE, CALCIUM CHLORIDE 600; 310; 30; 20 MG/100ML; MG/100ML; MG/100ML; MG/100ML
INJECTION, SOLUTION INTRAVENOUS CONTINUOUS
Status: DISCONTINUED | OUTPATIENT
Start: 2021-05-19 | End: 2021-05-19

## 2021-05-19 RX ORDER — DEXTROSE MONOHYDRATE 25 G/50ML
50 INJECTION, SOLUTION INTRAVENOUS
Status: DISCONTINUED | OUTPATIENT
Start: 2021-05-19 | End: 2021-05-19

## 2021-05-19 RX ADMIN — SODIUM CHLORIDE, SODIUM LACTATE, POTASSIUM CHLORIDE, CALCIUM CHLORIDE: 600; 310; 30; 20 INJECTION, SOLUTION INTRAVENOUS at 11:28:00

## 2021-05-19 RX ADMIN — SODIUM CHLORIDE, SODIUM LACTATE, POTASSIUM CHLORIDE, CALCIUM CHLORIDE: 600; 310; 30; 20 INJECTION, SOLUTION INTRAVENOUS at 11:10:00

## 2021-05-19 RX ADMIN — LIDOCAINE HYDROCHLORIDE 50 MG: 10 INJECTION, SOLUTION EPIDURAL; INFILTRATION; INTRACAUDAL; PERINEURAL at 11:12:00

## 2021-05-19 NOTE — ANESTHESIA POSTPROCEDURE EVALUATION
Patient: Miguelangel Garcia    Procedure Summary     Date: 05/19/21 Room / Location: [facility] ENDOSCOPY 01 / [facility] ENDOSCOPY    Anesthesia Start: 1110 Anesthesia Stop: 0663    Procedure: COLONOSCOPY (N/A ) Diagnosis:       Diarrhea, unspecified type      History

## 2021-05-19 NOTE — ANESTHESIA PREPROCEDURE EVALUATION
Anesthesia PreOp Note    HPI:     Jeovany Ramirez is a 65year old male who presents for preoperative consultation requested by: Jazz Soctt MD    Date of Surgery: 5/19/2021    Procedure(s):  COLONOSCOPY  Indication: Diarrhea, unspecified type, Hi 1  Glucose Blood (FREESTYLE LITE TEST) In Vitro Strip, 1 strip by In Vitro route 4 (four) times daily. , Disp: 400 strip, Rfl: 0  Na Sulfate-K Sulfate-Mg Sulf (SUPREP BOWEL PREP KIT) 17.5-3.13-1.6 GM/177ML Oral Solution, Take as directed, Disp: 1 Bottle, Rf education level: Not on file    Occupational History      Not on file    Tobacco Use      Smoking status: Never Smoker      Smokeless tobacco: Never Used      Tobacco comment: Second-hand smoke from smoking parents and college roommate    Vaping Use      V (+)  neuromuscular disease,       Comments: [place] palsy, lorena hunt syndrome    GI/Hepatic/Renal    (+) GERD, bowel prep    Comments: Bladder cancer    Endo/Other    (+) diabetes mellitus type 2, arthritis  Abdominal   (+) obese,              Anesthesia

## 2021-05-19 NOTE — H&P
History & Physical Examination    Patient Name: Lupis Santacruz  MRN: [id number]  CSN: [id number]  YOB: 1950    Diagnosis: screening for colon cancer    LISINOPRIL 40 MG Oral Tab, TAKE 1 TABLET BY MOUTH EVERY DAY, Disp: 90 tablet, Rfl: 0 injection, , Intravenous, PRN  propofol (DIPRIVAN) infusion, , Intravenous, Continuous PRN        Allergies:   Tetanus Immune Glob*    UNKNOWN    Comment:Unsure of reaction from childhood             Allergy to horse hair/serum.     Past Medical History:

## 2021-05-19 NOTE — OPERATIVE REPORT
COLONOSCOPY REPORT    Josie Tsang     1950 Age 65year old   PCP Deb Dan MD Endoscopist Sulaiman Vega MD     Date of procedure: 21    Procedure: Colonoscopy w/cold snare polypectomy and cold biopsy    Pre-operative di Diverticulosis: mild in the sigmoid. 3. Terminal ileum: the visualized mucosa appeared normal.    4. A retroflexed view of the rectum revealed small internal hemorrhoids.     5. The colonic mucosa throughout the colon showed normal vascular pattern, with

## 2021-05-21 RX ORDER — PRAVASTATIN SODIUM 40 MG
TABLET ORAL
Qty: 90 TABLET | Refills: 1 | Status: SHIPPED | OUTPATIENT
Start: 2021-05-21 | End: 2021-11-19

## 2021-05-24 ENCOUNTER — TELEPHONE (OUTPATIENT)
Dept: GASTROENTEROLOGY | Facility: CLINIC | Age: 71
End: 2021-05-24

## 2021-05-24 NOTE — TELEPHONE ENCOUNTER
Results letter mailed out to patient per Shay Roth, [facility] [address], MD [facility] Clinical Staff  No recall

## 2021-06-11 RX ORDER — ATENOLOL 50 MG/1
TABLET ORAL
Qty: 180 TABLET | Refills: 1 | OUTPATIENT
Start: 2021-06-11

## 2021-06-13 RX ORDER — ICOSAPENT ETHYL 1000 MG/1
CAPSULE ORAL
Qty: 180 CAPSULE | Refills: 1 | Status: SHIPPED | OUTPATIENT
Start: 2021-06-13 | End: 2021-12-09

## 2021-06-18 RX ORDER — ATENOLOL 50 MG/1
TABLET ORAL
Qty: 180 TABLET | Refills: 1 | Status: SHIPPED | OUTPATIENT
Start: 2021-06-18 | End: 2021-12-07

## 2021-06-24 ENCOUNTER — OFFICE VISIT (OUTPATIENT)
Dept: OTOLARYNGOLOGY | Facility: CLINIC | Age: 71
End: 2021-06-24
Payer: MEDICARE

## 2021-06-24 VITALS
TEMPERATURE: 98 F | HEIGHT: 68 IN | WEIGHT: 202 LBS | BODY MASS INDEX: 30.62 KG/M2 | SYSTOLIC BLOOD PRESSURE: 135 MMHG | DIASTOLIC BLOOD PRESSURE: 75 MMHG

## 2021-06-24 DIAGNOSIS — H61.23 BILATERAL IMPACTED CERUMEN: Primary | ICD-10-CM

## 2021-06-24 PROCEDURE — 69210 REMOVE IMPACTED EAR WAX UNI: CPT | Performed by: OTOLARYNGOLOGY

## 2021-06-24 NOTE — PROGRESS NOTES
Katey Cohen is a 65year old male. Patient presents with:  Ear Wax: pt presents today for both ears wax, needs cleaning. pt is feeling well today.       HISTORY OF PRESENT ILLNESS    Patient presents for cerumen removal. No other complaints or conc fever and weight loss. ENMT Negative Drooling. Eyes Negative Blurred vision and vision changes. Respiratory Negative Dyspnea and wheezing. Cardio Negative Chest pain, irregular heartbeat/palpitations and syncope.    GI Negative Abdominal pain and di TWICE A DAY, Disp: 180 capsule, Rfl: 1  •  PRAVASTATIN SODIUM 40 MG Oral Tab, TAKE 1 TABLET BY MOUTH EVERY DAY AT NIGHT, Disp: 90 tablet, Rfl: 1  •  LISINOPRIL 40 MG Oral Tab, TAKE 1 TABLET BY MOUTH EVERY DAY, Disp: 90 tablet, Rfl: 0  •  tamsulosin (FLOMAX

## 2021-07-01 ENCOUNTER — OFFICE VISIT (OUTPATIENT)
Dept: PODIATRY CLINIC | Facility: CLINIC | Age: 71
End: 2021-07-01
Payer: MEDICARE

## 2021-07-01 DIAGNOSIS — E11.9 TYPE 2 DIABETES MELLITUS WITHOUT COMPLICATION, WITHOUT LONG-TERM CURRENT USE OF INSULIN (HCC): Primary | ICD-10-CM

## 2021-07-01 PROCEDURE — 99203 OFFICE O/P NEW LOW 30 MIN: CPT | Performed by: PODIATRIST

## 2021-07-01 NOTE — PROGRESS NOTES
HPI:    Patient ID: Hazel Bonds is a 65year old male. Is an 38-year-old diabetic presents as a new patient to me on referral from [facility]. States that he is here for a diabetic foot evaluation. He has been a diabetic for as much is 15 years. Allergy to horse hair/serum. PHYSICAL EXAM:     On physical exam pulses are normal.  There is no clinical evidence of edema nor erythema. Skin texture is somewhat dry hair growth is noted on his toes.   All nails have profound dystrophy consistent

## 2021-07-14 ENCOUNTER — LAB ENCOUNTER (OUTPATIENT)
Dept: LAB | Facility: HOSPITAL | Age: 71
End: 2021-07-14
Attending: INTERNAL MEDICINE
Payer: MEDICARE

## 2021-07-14 DIAGNOSIS — E03.8 SUBCLINICAL HYPOTHYROIDISM: ICD-10-CM

## 2021-07-14 DIAGNOSIS — E78.5 DYSLIPIDEMIA: ICD-10-CM

## 2021-07-14 LAB
CHOLEST SMN-MCNC: 157 MG/DL (ref ?–200)
HDLC SERPL-MCNC: 34 MG/DL (ref 40–59)
LDLC SERPL CALC-MCNC: 79 MG/DL (ref ?–100)
NONHDLC SERPL-MCNC: 123 MG/DL (ref ?–130)
PATIENT FASTING Y/N/NP: YES
TRIGL SERPL-MCNC: 269 MG/DL (ref 30–149)
TSI SER-ACNC: 3.37 MIU/ML (ref 0.36–3.74)
VLDLC SERPL CALC-MCNC: 42 MG/DL (ref 0–30)

## 2021-07-14 PROCEDURE — 80061 LIPID PANEL: CPT

## 2021-07-14 PROCEDURE — 84443 ASSAY THYROID STIM HORMONE: CPT

## 2021-07-14 PROCEDURE — 36415 COLL VENOUS BLD VENIPUNCTURE: CPT

## 2021-07-16 ENCOUNTER — OFFICE VISIT (OUTPATIENT)
Dept: ENDOCRINOLOGY CLINIC | Facility: CLINIC | Age: 71
End: 2021-07-16
Payer: MEDICARE

## 2021-07-16 VITALS
DIASTOLIC BLOOD PRESSURE: 76 MMHG | SYSTOLIC BLOOD PRESSURE: 166 MMHG | HEART RATE: 53 BPM | WEIGHT: 197 LBS | BODY MASS INDEX: 29.86 KG/M2 | HEIGHT: 68 IN | RESPIRATION RATE: 16 BRPM

## 2021-07-16 DIAGNOSIS — E78.5 DYSLIPIDEMIA: ICD-10-CM

## 2021-07-16 DIAGNOSIS — E11.69 TYPE 2 DIABETES MELLITUS WITH OTHER SPECIFIED COMPLICATION, UNSPECIFIED WHETHER LONG TERM INSULIN USE (HCC): Primary | ICD-10-CM

## 2021-07-16 LAB
CARTRIDGE LOT#: ABNORMAL NUMERIC
GLUCOSE BLOOD: 172
HEMOGLOBIN A1C: 7.2 % (ref 4.3–5.6)
TEST STRIP LOT #: NORMAL NUMERIC

## 2021-07-16 PROCEDURE — 82947 ASSAY GLUCOSE BLOOD QUANT: CPT | Performed by: INTERNAL MEDICINE

## 2021-07-16 PROCEDURE — 83036 HEMOGLOBIN GLYCOSYLATED A1C: CPT | Performed by: INTERNAL MEDICINE

## 2021-07-16 PROCEDURE — 36416 COLLJ CAPILLARY BLOOD SPEC: CPT | Performed by: INTERNAL MEDICINE

## 2021-07-16 PROCEDURE — 99213 OFFICE O/P EST LOW 20 MIN: CPT | Performed by: INTERNAL MEDICINE

## 2021-07-16 RX ORDER — METFORMIN HYDROCHLORIDE 500 MG/1
TABLET, EXTENDED RELEASE ORAL
Qty: 135 TABLET | Refills: 1 | Status: SHIPPED | OUTPATIENT
Start: 2021-07-16 | End: 2021-10-15

## 2021-07-16 NOTE — PROGRESS NOTES
Return Office Visit     CHIEF COMPLAINT:    DM    Dyslipidemia  Subclinical hypothyroidism      HISTORY OF PRESENT ILLNESS:  Debbie Nova is a 65year old male who presents for follow up for DM.        DM HISTORY:  Diagnosed in 2000       HISTORY O from childhood             Allergy to horse hair/serum. PAST MEDICAL, SOCIAL AND FAMILY HISTORY:  See past medical history marked as reviewed. See past surgical history marked as reviewed. See past family history marked as reviewed.   See past social h the pathogenesis, natural course of diabetes. Patient understands the importance of glycemic control and the implications of uncontrolled diabetes including Diabetic ketoacidosis and various micro vascular and macrovascular complications. a).  Migel Scot

## 2021-07-23 RX ORDER — BLOOD-GLUCOSE METER
KIT MISCELLANEOUS
Qty: 100 STRIP | Refills: 0 | Status: SHIPPED | OUTPATIENT
Start: 2021-07-23

## 2021-07-28 RX ORDER — LISINOPRIL 40 MG/1
TABLET ORAL
Qty: 90 TABLET | Refills: 0 | Status: SHIPPED | OUTPATIENT
Start: 2021-07-28 | End: 2021-11-09

## 2021-08-04 ENCOUNTER — TELEPHONE (OUTPATIENT)
Dept: INTERNAL MEDICINE CLINIC | Facility: CLINIC | Age: 71
End: 2021-08-04

## 2021-08-04 DIAGNOSIS — I10 ESSENTIAL HYPERTENSION: ICD-10-CM

## 2021-08-04 DIAGNOSIS — Z11.52 ENCOUNTER FOR SCREENING FOR COVID-19: Primary | ICD-10-CM

## 2021-08-04 RX ORDER — AMLODIPINE BESYLATE 5 MG/1
5 TABLET ORAL 2 TIMES DAILY
Qty: 180 TABLET | Refills: 1 | Status: SHIPPED | OUTPATIENT
Start: 2021-08-04 | End: 2022-01-31

## 2021-08-04 NOTE — TELEPHONE ENCOUNTER
Pt calling to request a COVID-19 test. Pt states he recently came back from [place] over the weekend, and has an appointment with Dr. Parth Griffith this Friday. Pt wants to make sure he has a negative test before coming for his appointment.      Pt denies symptom

## 2021-08-04 NOTE — TELEPHONE ENCOUNTER
Refill passed per [facility] protocol. Requested Prescriptions   Pending Prescriptions Disp Refills    AMLODIPINE BESYLATE 5 MG Oral Tab [Pharmacy Med Name: AMLODIPINE BESYLATE 5 MG TAB] 180 tablet 1     Sig: TAKE 1 TABLET BY MOUTH TWICE A DAY        Hypertensive Medications Protocol Passed - 8/4/2021 12:03 AM        Passed - CMP or BMP in past 12 months        Passed - Appointment in past 6 or next 3 months        Passed - GFR Non- > 50     Lab Results   Component Value Date    GFRNAA 71 01/25/2021                      Future Appointments         Provider Department Appt Notes    In 2 days Lula Mckeon MD [facility], [address], [employer] Physical    In 2 weeks Rajan Grace MD [facility] Dermatology new full skin check     In 1 month Shanda Sanchez MD [facility] Ophthalmology ep/ 4 mos IOP     In 1 month Yeisondanny Gant, [address] [place] [address], [address], [place] cysto bladder ca per [place]    In 4 months Aron Leo, [facility] Endocrinology 5m f/u             Recent Outpatient Visits              2 weeks ago Type 2 diabetes mellitus with other specified complication, unspecified whether long term insulin use [facility])    [facility] Endocrinology Aron Leo MD    Office Visit    1 month ago Type 2 diabetes mellitus without complication, without long-term current use of insulin [facility])    [facility].  [facility] [place], Carlos Capone, DPM    Office Visit    1 month ago Bilateral impacted cerumen    [facility], [address], Sierra Hernandez MD    Office Visit    2 months ago Primary open angle glaucoma of both eyes, mild stage    [facility] Ophthalmology Shanda Sanchez MD    Office Visit    3 months ago Bilateral impacted cerumen    [facility], Sierra Hernandez MD    Office Visit

## 2021-08-05 ENCOUNTER — LAB ENCOUNTER (OUTPATIENT)
Dept: LAB | Facility: HOSPITAL | Age: 71
End: 2021-08-05
Attending: INTERNAL MEDICINE
Payer: MEDICARE

## 2021-08-05 DIAGNOSIS — Z11.52 ENCOUNTER FOR SCREENING FOR COVID-19: ICD-10-CM

## 2021-08-06 ENCOUNTER — OFFICE VISIT (OUTPATIENT)
Dept: INTERNAL MEDICINE CLINIC | Facility: CLINIC | Age: 71
End: 2021-08-06
Payer: MEDICARE

## 2021-08-06 VITALS
DIASTOLIC BLOOD PRESSURE: 66 MMHG | HEART RATE: 98 BPM | WEIGHT: 196 LBS | BODY MASS INDEX: 29.7 KG/M2 | HEIGHT: 68 IN | SYSTOLIC BLOOD PRESSURE: 138 MMHG | RESPIRATION RATE: 18 BRPM | TEMPERATURE: 98 F

## 2021-08-06 DIAGNOSIS — Z12.5 SCREENING FOR PROSTATE CANCER: ICD-10-CM

## 2021-08-06 DIAGNOSIS — Z00.00 ENCOUNTER FOR ANNUAL HEALTH EXAMINATION: Primary | ICD-10-CM

## 2021-08-06 DIAGNOSIS — C67.9 MALIGNANT NEOPLASM OF URINARY BLADDER, UNSPECIFIED SITE (HCC): ICD-10-CM

## 2021-08-06 DIAGNOSIS — I10 ESSENTIAL HYPERTENSION: ICD-10-CM

## 2021-08-06 DIAGNOSIS — E11.9 TYPE 2 DIABETES MELLITUS WITHOUT COMPLICATION, UNSPECIFIED WHETHER LONG TERM INSULIN USE (HCC): ICD-10-CM

## 2021-08-06 DIAGNOSIS — E78.5 HYPERLIPIDEMIA, UNSPECIFIED HYPERLIPIDEMIA TYPE: ICD-10-CM

## 2021-08-06 LAB — SARS-COV-2 RNA RESP QL NAA+PROBE: NOT DETECTED

## 2021-08-06 PROCEDURE — G0439 PPPS, SUBSEQ VISIT: HCPCS | Performed by: INTERNAL MEDICINE

## 2021-08-06 NOTE — PROGRESS NOTES
HPI:   Kim Tracey is a 65year old male who presents for a Medicare Subsequent Annual Wellness visit (Pt already had Initial Annual Wellness).     Patient is here requesting Medicare annual wellness visit and follow-up on chronic medical problems Family/surrogate (if present), and forms available to patient in AVS     He has a Power of  for [employer] on file in [address] He has never smoked tobacco.    CAGE screening score of 0 on 8/6/2021, showing low risk of alcohol abuse.          Sandra with breakfast and one tablet with dinner.   250 mg in AM and 500mg at dinner )  ATENOLOL 50 MG Oral Tab, TAKE 1 TABLET BY MOUTH TWICE A DAY  VASCEPA 1 g Oral Cap, TAKE 1 CAPSULE BY MOUTH TWICE A DAY  PRAVASTATIN SODIUM 40 MG Oral Tab, TAKE 1 TABLET BY MOUT 5' 8\" (1.727 m)   Wt 196 lb (88.9 kg)   BMI 29.80 kg/m²   Estimated body mass index is 29.8 kg/m² as calculated from the following:    Height as of this encounter: 5' 8\" (1.727 m). Weight as of this encounter: 196 lb (88.9 kg).     Medicare Hearing Ass Ear: Tympanic membrane and ear canal normal.      Nose: Nose normal.      Mouth/Throat:      Pharynx: No oropharyngeal exudate. Eyes:      Conjunctiva/sclera: Conjunctivae normal.      Pupils: Pupils are equal, round, and reactive to light.    Neck: for a Medicare Assessment. PLAN SUMMARY:   1. Encounter for annual health examination  Physical exam is unremarkable. Active issues as below. Health maintenance issues reviewed. Patient is up-to-date on vaccinations and other issues.   Consider yancy insurance carrier before scheduling to verify coverage.    PREVENTATIVE SERVICES FREQUENCY &  COVERAGE DETAILS LAST COMPLETION DATE   Diabetes Screening    Fasting Blood Sugar / Glucose    One screening every 12 months if never tested or if previously teste Hepatitis B One screening covered for patients with certain risk factors   -  No recommendations at this time    Tetanus Toxoid Not covered by Medicare Part B unless medically necessary (cut with metal); may be covered with your pharmacy prescription be

## 2021-08-06 NOTE — PATIENT INSTRUCTIONS
Johnson Jones's SCREENING SCHEDULE   Tests on this list are recommended by your physician but may not be covered, or covered at this frequency, by your insurer. Please check with your insurance carrier before scheduling to verify coverage.    PREV 09/16/2020  No recommendations at this time    Pneumococcal Each vaccine (Buaavqk25 & [id number]) covered once after 65 Prevnar 13: 09/06/2016    [id number]: 02/05/2018     No recommendations at this time    Hepatitis B One screening covered for patients This site has a lot of good information including definitions of the different types of Advance Directives.  It also has the State forms available on it's website for anyone to review and print using their home computer and printer. (the forms are also avai

## 2021-08-16 RX ORDER — SITAGLIPTIN 100 MG/1
TABLET, FILM COATED ORAL
Qty: 90 TABLET | Refills: 0 | Status: SHIPPED | OUTPATIENT
Start: 2021-08-16 | End: 2021-11-09

## 2021-08-20 ENCOUNTER — LAB ENCOUNTER (OUTPATIENT)
Dept: LAB | Facility: HOSPITAL | Age: 71
End: 2021-08-20
Attending: INTERNAL MEDICINE
Payer: MEDICARE

## 2021-08-20 DIAGNOSIS — Z12.5 SCREENING FOR PROSTATE CANCER: ICD-10-CM

## 2021-08-20 DIAGNOSIS — I10 ESSENTIAL HYPERTENSION: ICD-10-CM

## 2021-08-20 LAB
ALBUMIN SERPL-MCNC: 4 G/DL (ref 3.4–5)
ALBUMIN/GLOB SERPL: 1.1 {RATIO} (ref 1–2)
ALP LIVER SERPL-CCNC: 38 U/L
ALT SERPL-CCNC: 64 U/L
ANION GAP SERPL CALC-SCNC: 8 MMOL/L (ref 0–18)
AST SERPL-CCNC: 29 U/L (ref 15–37)
BASOPHILS # BLD AUTO: 0.05 X10(3) UL (ref 0–0.2)
BASOPHILS NFR BLD AUTO: 0.6 %
BILIRUB SERPL-MCNC: 0.6 MG/DL (ref 0.1–2)
BUN BLD-MCNC: 18 MG/DL (ref 7–18)
BUN/CREAT SERPL: 18.8 (ref 10–20)
CALCIUM BLD-MCNC: 8.9 MG/DL (ref 8.5–10.1)
CHLORIDE SERPL-SCNC: 100 MMOL/L (ref 98–112)
CO2 SERPL-SCNC: 28 MMOL/L (ref 21–32)
COMPLEXED PSA SERPL-MCNC: 4.42 NG/ML (ref ?–4)
CREAT BLD-MCNC: 0.96 MG/DL
DEPRECATED RDW RBC AUTO: 43.9 FL (ref 35.1–46.3)
EOSINOPHIL # BLD AUTO: 0.27 X10(3) UL (ref 0–0.7)
EOSINOPHIL NFR BLD AUTO: 3.2 %
ERYTHROCYTE [DISTWIDTH] IN BLOOD BY AUTOMATED COUNT: 12.8 % (ref 11–15)
GLOBULIN PLAS-MCNC: 3.5 G/DL (ref 2.8–4.4)
GLUCOSE BLD-MCNC: 145 MG/DL (ref 70–99)
HCT VFR BLD AUTO: 49.5 %
HGB BLD-MCNC: 16.5 G/DL
IMM GRANULOCYTES # BLD AUTO: 0.04 X10(3) UL (ref 0–1)
IMM GRANULOCYTES NFR BLD: 0.5 %
LYMPHOCYTES # BLD AUTO: 2.38 X10(3) UL (ref 1–4)
LYMPHOCYTES NFR BLD AUTO: 28.3 %
M PROTEIN MFR SERPL ELPH: 7.5 G/DL (ref 6.4–8.2)
MCH RBC QN AUTO: 31.3 PG (ref 26–34)
MCHC RBC AUTO-ENTMCNC: 33.3 G/DL (ref 31–37)
MCV RBC AUTO: 93.8 FL
MONOCYTES # BLD AUTO: 0.88 X10(3) UL (ref 0.1–1)
MONOCYTES NFR BLD AUTO: 10.5 %
NEUTROPHILS # BLD AUTO: 4.79 X10 (3) UL (ref 1.5–7.7)
NEUTROPHILS # BLD AUTO: 4.79 X10(3) UL (ref 1.5–7.7)
NEUTROPHILS NFR BLD AUTO: 56.9 %
OSMOLALITY SERPL CALC.SUM OF ELEC: 286 MOSM/KG (ref 275–295)
PATIENT FASTING Y/N/NP: YES
PLATELET # BLD AUTO: 234 10(3)UL (ref 150–450)
POTASSIUM SERPL-SCNC: 4.2 MMOL/L (ref 3.5–5.1)
RBC # BLD AUTO: 5.28 X10(6)UL
SODIUM SERPL-SCNC: 136 MMOL/L (ref 136–145)
VIT B12 SERPL-MCNC: 392 PG/ML (ref 193–986)
WBC # BLD AUTO: 8.4 X10(3) UL (ref 4–11)

## 2021-08-20 PROCEDURE — 85025 COMPLETE CBC W/AUTO DIFF WBC: CPT

## 2021-08-20 PROCEDURE — 36415 COLL VENOUS BLD VENIPUNCTURE: CPT

## 2021-08-20 PROCEDURE — 80053 COMPREHEN METABOLIC PANEL: CPT

## 2021-08-20 PROCEDURE — 82607 VITAMIN B-12: CPT

## 2021-08-23 ENCOUNTER — OFFICE VISIT (OUTPATIENT)
Dept: DERMATOLOGY CLINIC | Facility: CLINIC | Age: 71
End: 2021-08-23
Payer: MEDICARE

## 2021-08-23 DIAGNOSIS — L82.1 SEBORRHEIC KERATOSES: ICD-10-CM

## 2021-08-23 DIAGNOSIS — D22.9 MULTIPLE NEVI: ICD-10-CM

## 2021-08-23 DIAGNOSIS — L57.8 SUN-DAMAGED SKIN: ICD-10-CM

## 2021-08-23 DIAGNOSIS — D18.01 CHERRY ANGIOMA: ICD-10-CM

## 2021-08-23 DIAGNOSIS — L72.0 EPIDERMAL CYST: ICD-10-CM

## 2021-08-23 DIAGNOSIS — D48.5 NEOPLASM OF UNCERTAIN BEHAVIOR OF SKIN: Primary | ICD-10-CM

## 2021-08-23 PROCEDURE — 99203 OFFICE O/P NEW LOW 30 MIN: CPT | Performed by: DERMATOLOGY

## 2021-08-23 PROCEDURE — 88305 TISSUE EXAM BY PATHOLOGIST: CPT | Performed by: DERMATOLOGY

## 2021-08-23 PROCEDURE — 11102 TANGNTL BX SKIN SINGLE LES: CPT | Performed by: DERMATOLOGY

## 2021-08-27 NOTE — PROGRESS NOTES
Results logged in [address]. Patient informed of test results and all KMT's recommendations. Voiced understanding.  Path routed to Dr. Chris Lawrence, her contact info sent to pt via [employer]ATA per request.

## 2021-09-06 NOTE — PROGRESS NOTES
Operative Report                     Shave/  Tangential biopsy     Clinical diagnosis:    Size of lesion:    Location:pt with remote history of skin cancer  Spec 1 Description >>>>>: right medial cheek  Spec 1 Comment: r/o bcc pearly papule new    Pr

## 2021-09-06 NOTE — PROGRESS NOTES
Debbie Nova is a 65year old male. HPI:     CC:  Patient presents with:  Full Skin Exam: New pt requesting full skin exam. Concerned with new lesion on right cheek and lesions on back of legs where he had a bad sunburn 60 years ago.  Personal hx of amLODIPine besylate 5 MG Oral Tab Take 1 tablet (5 mg total) by mouth 2 (two) times daily. 180 tablet 1   • LISINOPRIL 40 MG Oral Tab TAKE 1 TABLET BY MOUTH EVERY DAY 90 tablet 0   • FREESTYLE LITE TEST In Vitro Strip 1 STRIP BY IN VITRO ROUTE DAILY.  100 s Socioeconomic History      Marital status:       Spouse name: Not on file      Number of children: Not on file      Years of education: Not on file      Highest education level: Not on file    Occupational History      Not on file    Tobacco Use Neg        There were no vitals filed for this visit. HPI:    Patient presents with:  Full Skin Exam: New pt requesting full skin exam. Concerned with new lesion on right cheek and lesions on back of legs where he had a bad sunburn 60 years ago.  Dane Remarkable for:    pt with remote history of skin cancer  Spec 1 Description >>>>>: right medial cheek  Spec 1 Comment: r/o bcc pearly papule new  Shave/ tangential biopsy performed, operative note and consent in chart further plans pending pathology    Mu

## 2021-09-06 NOTE — PATIENT INSTRUCTIONS
Sun Protection  Wear protective clothing. This includes wearing a broad rimmed hat, long sleeves, high collars, and tightly woven clothes. Specific clothing lines are available for people who need more complete sun avoidance.  Use a broad spectrum sunblock exposure and reapply 20 minutes after exposure begins. Studies suggest this provides improved protection. Reapply sunscreens and sunblocks after 2 hours of use or after 80 minutes if swimming or sweating.  Sunscreen effectiveness diminishes after 2 hours un front of a full-length mirror.  Check all parts of your body, including your:  · Head (ears, face, neck, and scalp)  · Torso (front, back, and sides)  · Arms (tops, undersides, upper, and lower armpits)  · Hands (palms, backs, and fingers, including under t steps to prevent skin cancer. When you are outdoors  Protect your skin when you go out during the day. Take safety steps whenever you go out to eat, run errands by car or on foot, or do any outdoor activity.  There isn’t just one easy way to protect your s protect you, it isn’t enough. Sunscreens extend the length of time you can be outdoors before your skin starts to get red. But they don't give you total protection. Using sunscreen doesn't mean you can stay out in the sun for an unlimited time.  Your skin c flexibility and strength. The fat layer is the deepest layer consisting of fat and collagen. It helps provide the body's heat and protects the body from injury. Skin cancer is a type of cancer that grows in the epidermal layer of the skin.  It can form in the genitals, mouth, palms of hands, bottoms of feet, and under the nails. There are other types of skin cancer, too. These include Merkel cell cancer, Kaposi sarcoma, and skin (cutaneous) lymphomas, but these cancers are quite rare.   Actinic keratosis  A

## 2021-09-21 ENCOUNTER — OFFICE VISIT (OUTPATIENT)
Dept: OPHTHALMOLOGY | Facility: CLINIC | Age: 71
End: 2021-09-21
Payer: MEDICARE

## 2021-09-21 DIAGNOSIS — H01.00B BLEPHARITIS OF UPPER AND LOWER EYELIDS OF BOTH EYES, UNSPECIFIED TYPE: ICD-10-CM

## 2021-09-21 DIAGNOSIS — H01.00A BLEPHARITIS OF UPPER AND LOWER EYELIDS OF BOTH EYES, UNSPECIFIED TYPE: ICD-10-CM

## 2021-09-21 DIAGNOSIS — H40.1131 PRIMARY OPEN ANGLE GLAUCOMA OF BOTH EYES, MILD STAGE: Primary | ICD-10-CM

## 2021-09-21 PROCEDURE — 99213 OFFICE O/P EST LOW 20 MIN: CPT | Performed by: OPHTHALMOLOGY

## 2021-09-21 NOTE — PROGRESS NOTES
Minh Armijo is a 65year old male. HPI:     HPI     Patient is here for an IOP check. He is taking Latanoprost OU at bedtime as directed. Patient states his vision is stable.       Last edited by Gio Calderon OT on 9/21/2021  1:58 PM. (Histo MOUTH EVERY DAY 90 tablet 0   • amLODIPine besylate 5 MG Oral Tab Take 1 tablet (5 mg total) by mouth 2 (two) times daily.  180 tablet 1   • LISINOPRIL 40 MG Oral Tab TAKE 1 TABLET BY MOUTH EVERY DAY 90 tablet 0   • FREESTYLE LITE TEST In Vitro Strip 1 STRI transillumination defects          Fundus Exam       Right Left    Disc Sloping margin, Peripapillary atrophy Sloping margin, Peripapillary atrophy    C/D Ratio 0.8 0.9            Refraction     Wearing Rx       Sphere Cylinder Axis Add    Right -1.00 +0. 7

## 2021-09-21 NOTE — PATIENT INSTRUCTIONS
Primary open angle glaucoma of both eyes, mild stage  IOP is stable. Continue Latanoprost; one drop in both eyes at bedtime.     Will have patient back in 4 months for a visual field, OCT and diabetic eye exam.

## 2021-09-21 NOTE — ASSESSMENT & PLAN NOTE
IOP is stable. Continue Latanoprost; one drop in both eyes at bedtime.     Will have patient back in 4 months for a visual field, OCT and diabetic eye exam.

## 2021-09-25 ENCOUNTER — LAB ENCOUNTER (OUTPATIENT)
Dept: LAB | Facility: HOSPITAL | Age: 71
End: 2021-09-25
Attending: UROLOGY
Payer: MEDICARE

## 2021-09-25 DIAGNOSIS — C67.2 MALIGNANT NEOPLASM OF LATERAL WALL OF URINARY BLADDER (HCC): ICD-10-CM

## 2021-09-25 LAB
BILIRUB UR QL: NEGATIVE
CLARITY UR: CLEAR
COLOR UR: YELLOW
GLUCOSE UR-MCNC: NEGATIVE MG/DL
HGB UR QL STRIP.AUTO: NEGATIVE
KETONES UR-MCNC: NEGATIVE MG/DL
LEUKOCYTE ESTERASE UR QL STRIP.AUTO: NEGATIVE
NITRITE UR QL STRIP.AUTO: NEGATIVE
PH UR: 5 [PH] (ref 5–8)
PROT UR-MCNC: NEGATIVE MG/DL
SP GR UR STRIP: 1.02 (ref 1–1.03)
UROBILINOGEN UR STRIP-ACNC: <2

## 2021-09-25 PROCEDURE — 81003 URINALYSIS AUTO W/O SCOPE: CPT

## 2021-09-25 PROCEDURE — 88108 CYTOPATH CONCENTRATE TECH: CPT

## 2021-09-27 ENCOUNTER — PROCEDURE (OUTPATIENT)
Dept: SURGERY | Facility: CLINIC | Age: 71
End: 2021-09-27
Payer: MEDICARE

## 2021-09-27 VITALS
RESPIRATION RATE: 16 BRPM | SYSTOLIC BLOOD PRESSURE: 140 MMHG | WEIGHT: 196 LBS | HEART RATE: 61 BPM | HEIGHT: 68 IN | BODY MASS INDEX: 29.7 KG/M2 | DIASTOLIC BLOOD PRESSURE: 70 MMHG

## 2021-09-27 DIAGNOSIS — R97.20 ELEVATED PSA: ICD-10-CM

## 2021-09-27 DIAGNOSIS — R35.1 NOCTURIA: ICD-10-CM

## 2021-09-27 DIAGNOSIS — N28.1 COMPLEX RENAL CYST: ICD-10-CM

## 2021-09-27 DIAGNOSIS — N32.0 BLADDER NECK OBSTRUCTION: ICD-10-CM

## 2021-09-27 DIAGNOSIS — C67.9 MALIGNANT NEOPLASM OF URINARY BLADDER, UNSPECIFIED SITE (HCC): Primary | ICD-10-CM

## 2021-09-27 DIAGNOSIS — R39.12 BENIGN PROSTATIC HYPERPLASIA WITH WEAK URINARY STREAM: ICD-10-CM

## 2021-09-27 DIAGNOSIS — Z87.442 HISTORY OF KIDNEY STONES: ICD-10-CM

## 2021-09-27 DIAGNOSIS — N47.1 PHIMOSIS: ICD-10-CM

## 2021-09-27 DIAGNOSIS — N40.1 BENIGN PROSTATIC HYPERPLASIA WITH WEAK URINARY STREAM: ICD-10-CM

## 2021-09-27 LAB — NON GYNE INTERPRETATION: NEGATIVE

## 2021-09-27 PROCEDURE — 99214 OFFICE O/P EST MOD 30 MIN: CPT | Performed by: UROLOGY

## 2021-09-27 PROCEDURE — 52000 CYSTOURETHROSCOPY: CPT | Performed by: UROLOGY

## 2021-09-27 RX ORDER — DIAZEPAM 10 MG/1
TABLET ORAL
Qty: 1 TABLET | Refills: 0 | Status: SHIPPED | OUTPATIENT
Start: 2021-09-27

## 2021-09-27 RX ORDER — CIPROFLOXACIN 500 MG/1
500 TABLET, FILM COATED ORAL ONCE
Status: COMPLETED | OUTPATIENT
Start: 2021-09-27 | End: 2021-09-27

## 2021-09-27 RX ADMIN — CIPROFLOXACIN 500 MG: 500 TABLET, FILM COATED ORAL at 10:40:00

## 2021-09-27 NOTE — PATIENT INSTRUCTIONS
Sejal Manuel M.D.           1. . If you have burning with urination, please buy \"AZO\" over the counter medication for burning - take 1 tablet every 8 hours as needed for burning pain.  It makes the color of the uri increase in size, please avoid salty foods and also sodium and salt in your diet --avoid fast foods, frozen foods, pizza which are very high in salt; restaurant food is naturally high in salt so if you do eat at restaurants, ask the   to prepare a low-

## 2021-09-27 NOTE — PROGRESS NOTES
PREOPERATIVE DIAGNOSIS:     Malignant neoplasm of lateral wall of urinary bladder 06/27/2019      POSTOP DIAGNOSIS:                    The same;  No tumors, stones, or CIS of the bladder or bladder neck      PROCEDURE:              Cystoscopy      ANESTHE resected and fulgurated under general anesthesia; pathology = low-grade papillary urothelial carcinoma, non-invasive.  Pathology originally performed through [address] [place] [address], second opinion pathology with the [facility] was performed due to low volume of urothelial ca stone in 2017 in the ER. He presently denies any associated flank pain, dysuria, gross hematuria, kidney stone attacks or passage of kidney stones.  Patient feels this is stable as he is currently asymptomatic.          So that we could have a meaningful an of the bladder and right lateral wall of the bladder; to avoid possibility of obturator nerve spasm, patient intubated and paralyzing agent given; no obturator nerve spasm whatsoever during the case; tumor is only a few millimeters high and spreading horiz 10/21/2020 Urine cytology = negative for high-grade urothelial carcinoma   08/11/2020 PSA = 3.88; Creatinine = 1.09; GFR = 68   04/22/2020 Urine cytology = negative for high-grade urothelial carcinoma  01/27/2020 urine cytology = negative for high-grade months under local anesthesia.  I fully explained to patient the benefits, risks, and alternatives to this treatment option and I answered patient's questions; patient decides to follow up in 6 months with urine cytology followed by office cystoscopy  with continue observation for now.      (N47.1) Phimosis  Chronic. Severe. On 04/26/2021 PE , severe phimosis; I can barely pass through urethra of meatus. Consistently decided against surgical circumcision.  Patient presently denies any associated pain or sympt taking it--that is okay     4.   Office cystoscopy with possible bladder biopsy in 6 months     5.    Diazepam/Valium 10 mg tablet upon arrival at our  for the above procedure; you must have a      6.  Please stop aspirin and NSAIDs ([facility] is high in the natural chemical citrate  which in general lowers probability of forming kidney stones.  Your dentist may be upset with you drinking lemonade because it might wear down the enamel on your teeth, so the solution would be to drink le Donna Laurent MD.   Electronically Signed: Lilia Myers, 9/27/2021, 9:16 AM.    I, Donna Laurent MD,  personally performed the services described in this documentation.  All medical record entries made by the scribe were at my direction and in my p

## 2021-10-14 ENCOUNTER — IMMUNIZATION (OUTPATIENT)
Dept: LAB | Facility: HOSPITAL | Age: 71
End: 2021-10-14
Attending: EMERGENCY MEDICINE
Payer: MEDICARE

## 2021-10-14 DIAGNOSIS — Z23 NEED FOR VACCINATION: Primary | ICD-10-CM

## 2021-10-14 PROCEDURE — 0003A SARSCOV2 VAC 30MCG/0.3ML IM: CPT

## 2021-10-15 ENCOUNTER — OFFICE VISIT (OUTPATIENT)
Dept: OTOLARYNGOLOGY | Facility: CLINIC | Age: 71
End: 2021-10-15
Payer: MEDICARE

## 2021-10-15 VITALS — WEIGHT: 201 LBS | BODY MASS INDEX: 30.46 KG/M2 | HEIGHT: 68 IN

## 2021-10-15 DIAGNOSIS — H61.23 BILATERAL IMPACTED CERUMEN: Primary | ICD-10-CM

## 2021-10-15 PROCEDURE — 69210 REMOVE IMPACTED EAR WAX UNI: CPT | Performed by: OTOLARYNGOLOGY

## 2021-10-15 RX ORDER — METFORMIN HYDROCHLORIDE 500 MG/1
TABLET, EXTENDED RELEASE ORAL
Qty: 180 TABLET | Refills: 0 | Status: SHIPPED | OUTPATIENT
Start: 2021-10-15 | End: 2022-01-06

## 2021-10-15 NOTE — PROGRESS NOTES
Kim Tracey is a 65year old male. Patient presents with:  Ear Wax: pt presents today for cleaning on both ears due to wax build up.        HISTORY OF PRESENT ILLNESS    Patient presents for cerumen removal. No other complaints or concerns at this Location: [address] [place] [address] ENDOSCOPY   • TONSILLECTOMY         REVIEW OF SYSTEMS    System Neg/Pos Details   Constitutional Negative Fatigue, fever and weight loss. ENMT Negative Drooling. Eyes Negative Blurred vision and vision changes.    Respiratory Negative Dysp Disp: 1 tablet, Rfl: 0  •  JANUVIA 100 MG Oral Tab, TAKE 1 TABLET BY MOUTH EVERY DAY, Disp: 90 tablet, Rfl: 0  •  amLODIPine besylate 5 MG Oral Tab, Take 1 tablet (5 mg total) by mouth 2 (two) times daily. , Disp: 180 tablet, Rfl: 1  •  LISINOPRIL 40 MG Ora

## 2021-11-09 RX ORDER — SITAGLIPTIN 100 MG/1
TABLET, FILM COATED ORAL
Qty: 90 TABLET | Refills: 0 | Status: SHIPPED | OUTPATIENT
Start: 2021-11-09 | End: 2022-02-07

## 2021-11-09 RX ORDER — LISINOPRIL 40 MG/1
TABLET ORAL
Qty: 90 TABLET | Refills: 0 | Status: SHIPPED | OUTPATIENT
Start: 2021-11-09 | End: 2022-02-07

## 2021-11-19 RX ORDER — PRAVASTATIN SODIUM 40 MG
40 TABLET ORAL NIGHTLY
Qty: 90 TABLET | Refills: 1 | Status: SHIPPED | OUTPATIENT
Start: 2021-11-19 | End: 2022-11-18

## 2021-11-19 NOTE — TELEPHONE ENCOUNTER
Refill passed per [facility] protocol.     Requested Prescriptions   Pending Prescriptions Disp Refills    PRAVASTATIN 40 MG Oral Tab [Pharmacy Med Name: PRAVASTATIN SODIUM 40 MG TAB] 90 tablet 1     Sig: TAKE 1 TABLET BY MOUTH EVERY DAY AT NIGHT        Cholesterol Medication Protocol Passed - 11/19/2021 12:04 AM        Passed - ALT in past 12 months        Passed - LDL in past 12 months        Passed - Last ALT < 80       Lab Results   Component Value Date    ALT 64 (H) 08/20/2021             Passed - Last LDL < 130     Lab Results   Component Value Date    LDL 79 07/14/2021               Passed - Appointment in past 12 or next 3 months              Future Appointments         Provider Department Appt Notes    In 1 month Aydee Betancur, [address] Endocrinology 5m f/u    In 2 months Rakan Abebe MD [facility] EP/VF, OCT and DM EE    In 4 months Jennie Diaz, [address] [place] [address], [address] [place] [address] cysto bladder ca             Recent Outpatient Visits              1 month ago Bilateral impacted cerumen    [facility], [address], Augusto Hernandez MD    Office Visit    1 month ago Primary open angle glaucoma of both eyes, mild stage    [facility] Ophthal[facility] Ethyl MD Andrae    Office Visit    2 months ago Neoplasm of uncertain behavior of skin    [facility] [place] Dermatology Jasvir Shrestha MD    Office Visit    3 months ago Encounter for annual health examination    [facility], [address], Yumi Bynum MD    Office Visit    4 months ago Type 2 diabetes mellitus with other specified complication, unspecified whether long term insulin use [facility])    [facility] Endocrinology Bajwa Mercy, MD    Office Visit

## 2021-12-07 RX ORDER — ATENOLOL 50 MG/1
50 TABLET ORAL 2 TIMES DAILY
Qty: 180 TABLET | Refills: 1 | Status: SHIPPED | OUTPATIENT
Start: 2021-12-07 | End: 2022-03-27

## 2021-12-07 NOTE — TELEPHONE ENCOUNTER
Refill passed per [facility] protocol.      Requested Prescriptions   Pending Prescriptions Disp Refills    ATENOLOL 50 MG Oral Tab [Pharmacy Med Name: ATENOLOL 50 MG TABLET] 180 tablet 1     Sig: TAKE 1 TABLET BY MOUTH TWICE A DAY        Hypertensive Medications Protocol Passed - 12/7/2021 12:04 AM        Passed - CMP or BMP in past 12 months        Passed - Appointment in past 6 or next 3 months        Passed - GFR Non- > 50     Lab Results   Component Value Date    GFRNAA 79 08/20/2021                         Recent Outpatient Visits              1 month ago Bilateral impacted cerumen    [facility], [address], Ale Hernandez MD    Office Visit    2 months ago Primary open angle glaucoma of both eyes, mild stage    [facility] Ophthal[facility] Kristel Pérez MD    Office Visit    3 months ago Neoplasm of uncertain behavior of skin    [facility] [place] Dermatology Joyce Fuentes MD    Office Visit    4 months ago Encounter for annual health examination    [facility], Yuan Shah MD    Office Visit    4 months ago Type 2 diabetes mellitus with other specified complication, unspecified whether long term insulin use [facility])    [facility] Endocrinology Fiorella Adams MD    Office Visit             Future Appointments         Provider Department Appt Notes    In 1 week Fiorella Adams MD [facility] Endocrinology 5m f/u    In 1 month Kristel Pérez MD [facility] Ophthal[facility] EP/VF, OCT and DM EE    In 3 months Maranda Xie MD [facility], [address] [facility] cysto bladder ca

## 2021-12-09 RX ORDER — ICOSAPENT ETHYL 1000 MG/1
1 CAPSULE ORAL 2 TIMES DAILY
Qty: 180 CAPSULE | Refills: 1 | Status: SHIPPED | OUTPATIENT
Start: 2021-12-09 | End: 2022-05-25

## 2021-12-09 NOTE — TELEPHONE ENCOUNTER
Refill passed per [address] [place] protocol.     Requested Prescriptions   Pending Prescriptions Disp Refills    VASCEPA 1 g Oral Cap [Pharmacy Med Name: Doyle Staff 1 GM CAPSULE] 180 capsule 1     Sig: TAKE 1 CAPSULE BY MOUTH TWICE A DAY        Cholesterol Medication Protocol Passed - 12/9/2021 12:02 AM        Passed - ALT in past 12 months        Passed - LDL in past 12 months        Passed - Last ALT < 80       Lab Results   Component Value Date    ALT 64 (H) 08/20/2021             Passed - Last LDL < 130     Lab Results   Component Value Date    LDL 79 07/14/2021               Passed - Appointment in past 12 or next 3 months            Future Appointments         Provider Department Appt Notes    In 1 week Bernabe Peña MD [address] Endocrinology 5m f/u    In 1 month Marisela Burk MD [facility] Ophthalmology EP/VF, OCT and DM EE    In 3 months Lorena Poon, [address] [place] [address], [address] [place] [address] cysto bladder ca           Recent Outpatient Visits              1 month ago Bilateral impacted cerumen    [facility], [address], Rajan Hernandez MD    Office Visit    2 months ago Primary open angle glaucoma of both eyes, mild stage    [facility] Ophthal[facility] Marisela Burk MD    Office Visit    3 months ago Neoplasm of uncertain behavior of skin    [facility] Dermatology Rico Diaz MD    Office Visit    4 months ago Encounter for annual health examination    [address], [address], Alejandra Billy MD    Office Visit    4 months ago Type 2 diabetes mellitus with other specified complication, unspecified whether long term insulin use [facility])    [address] [place] [address] Endocrinology Bernabe Peña MD    Office Visit

## 2021-12-20 ENCOUNTER — OFFICE VISIT (OUTPATIENT)
Dept: ENDOCRINOLOGY CLINIC | Facility: CLINIC | Age: 71
End: 2021-12-20
Payer: MEDICARE

## 2021-12-20 VITALS
SYSTOLIC BLOOD PRESSURE: 169 MMHG | WEIGHT: 196 LBS | BODY MASS INDEX: 30 KG/M2 | DIASTOLIC BLOOD PRESSURE: 79 MMHG | HEART RATE: 53 BPM

## 2021-12-20 DIAGNOSIS — E11.69 TYPE 2 DIABETES MELLITUS WITH OTHER SPECIFIED COMPLICATION, UNSPECIFIED WHETHER LONG TERM INSULIN USE (HCC): Primary | ICD-10-CM

## 2021-12-20 DIAGNOSIS — E03.8 SUBCLINICAL HYPOTHYROIDISM: ICD-10-CM

## 2021-12-20 DIAGNOSIS — E78.5 DYSLIPIDEMIA: ICD-10-CM

## 2021-12-20 PROCEDURE — 82947 ASSAY GLUCOSE BLOOD QUANT: CPT | Performed by: INTERNAL MEDICINE

## 2021-12-20 PROCEDURE — 83036 HEMOGLOBIN GLYCOSYLATED A1C: CPT | Performed by: INTERNAL MEDICINE

## 2021-12-20 PROCEDURE — 36416 COLLJ CAPILLARY BLOOD SPEC: CPT | Performed by: INTERNAL MEDICINE

## 2021-12-20 PROCEDURE — 99214 OFFICE O/P EST MOD 30 MIN: CPT | Performed by: INTERNAL MEDICINE

## 2021-12-20 NOTE — PROGRESS NOTES
Return Office Visit     CHIEF COMPLAINT:    DM    Dyslipidemia        HISTORY OF PRESENT ILLNESS:  Luke Frankel is a 66year old male who presents for follow up for DM.        DM HISTORY:  Diagnosed in 2000       HISTORY OF DIABETES COMPLICATIONS: ALLERGY:    Tetanus Immune Glob*    UNKNOWN    Comment:Unsure of reaction from childhood             Allergy to horse hair/serum. PAST MEDICAL, SOCIAL AND FAMILY HISTORY:  See past medical history marked as reviewed.   See past surgical history raegan PLAN:    1. Type 2 DM: with hyperglycemia     Plan:  Discussed the pathogenesis, natural course of diabetes.  Patient understands the importance of glycemic control and the implications of uncontrolled diabetes including Diabetic ketoacidosis and various mi

## 2022-01-04 LAB — AMB EXT COVID-19 RESULT: DETECTED

## 2022-01-06 ENCOUNTER — PATIENT MESSAGE (OUTPATIENT)
Dept: ENDOCRINOLOGY CLINIC | Facility: CLINIC | Age: 72
End: 2022-01-06

## 2022-01-06 ENCOUNTER — PATIENT MESSAGE (OUTPATIENT)
Dept: INTERNAL MEDICINE CLINIC | Facility: CLINIC | Age: 72
End: 2022-01-06

## 2022-01-06 RX ORDER — METFORMIN HYDROCHLORIDE 500 MG/1
TABLET, EXTENDED RELEASE ORAL
Qty: 225 TABLET | Refills: 0 | Status: SHIPPED | OUTPATIENT
Start: 2022-01-06

## 2022-01-06 NOTE — TELEPHONE ENCOUNTER
From: Elizabeth Ridley  To: Rocío Trujillo MD  Sent: 2022 12:39 PM CST  Subject: Juliette George Members. This is Marietta Jobs ( 1950).  I want to report to you that I have tested positive for COVID-19 on 2022, using t

## 2022-01-24 ENCOUNTER — TELEPHONE (OUTPATIENT)
Dept: INTERNAL MEDICINE CLINIC | Facility: CLINIC | Age: 72
End: 2022-01-24

## 2022-01-24 NOTE — TELEPHONE ENCOUNTER
Drug not covered by pt insurance plan. Preferred alt is VASCEPA. Please call the pharmacy to change the medication  •  Icosapent Ethyl (VASCEPA) 1 g Oral Cap, Take 1 capsule by mouth 2 (two) times daily. , Disp: 180 capsule, Rfl: 1

## 2022-01-24 NOTE — TELEPHONE ENCOUNTER
Closed  1/24/2022  3:24 PM  Close reason: Prior Authorization not required for patient/medication   Note from payer: Drug is covered by current benefit plan.  No further PA activity needed   Payer:  Louise Fine     [phone]     [phone]

## 2022-01-27 ENCOUNTER — OFFICE VISIT (OUTPATIENT)
Dept: OTOLARYNGOLOGY | Facility: CLINIC | Age: 72
End: 2022-01-27
Payer: MEDICARE

## 2022-01-27 VITALS — HEIGHT: 68 IN | BODY MASS INDEX: 29.7 KG/M2 | WEIGHT: 196 LBS | TEMPERATURE: 97 F

## 2022-01-27 DIAGNOSIS — H61.23 BILATERAL IMPACTED CERUMEN: Primary | ICD-10-CM

## 2022-01-27 PROCEDURE — 69210 REMOVE IMPACTED EAR WAX UNI: CPT | Performed by: OTOLARYNGOLOGY

## 2022-01-27 NOTE — PROGRESS NOTES
Orlin Jordan is a 66year old male.   Patient presents with:  Ear Wax: pt here for ear cleaning      HISTORY OF PRESENT ILLNESS    Patient presents for cerumen removal. No other complaints or concerns at this time    Social History    Socioeconomic H REVIEW OF SYSTEMS    System Neg/Pos Details   Constitutional Negative Fatigue, fever and weight loss. ENMT Negative Drooling. Eyes Negative Blurred vision and vision changes. Respiratory Negative Dyspnea and wheezing.    Cardio Negative Chest pa Icosapent Ethyl (VASCEPA) 1 g Oral Cap, Take 1 capsule by mouth 2 (two) times daily. , Disp: 180 capsule, Rfl: 1  •  atenolol 50 MG Oral Tab, Take 1 tablet (50 mg total) by mouth 2 (two) times daily. , Disp: 180 tablet, Rfl: 1  •  pravastatin 40 MG Oral Tab

## 2022-01-30 DIAGNOSIS — I10 ESSENTIAL HYPERTENSION: ICD-10-CM

## 2022-01-31 RX ORDER — AMLODIPINE BESYLATE 5 MG/1
5 TABLET ORAL 2 TIMES DAILY
Qty: 180 TABLET | Refills: 1 | Status: SHIPPED | OUTPATIENT
Start: 2022-01-31 | End: 2022-09-21

## 2022-01-31 NOTE — TELEPHONE ENCOUNTER
Refill passed per [facility] protocol.   Requested Prescriptions   Pending Prescriptions Disp Refills    AMLODIPINE 5 MG Oral Tab [Pharmacy Med Name: AMLODIPINE BESYLATE 5 MG TAB] 180 tablet 1     Sig: TAKE 1 TABLET BY MOUTH TWICE A DAY        Hypertensive Medications Protocol Passed - 1/30/2022 12:11 AM        Passed - CMP or BMP in past 12 months        Passed - Appointment in past 6 or next 3 months        Passed - GFR Non- > 50     Lab Results   Component Value Date    GFRNAA 79 08/20/2021                        Recent Outpatient Visits              4 days ago Bilateral impacted cerumen    [facility], [address], Sierra Hernandez MD    Office Visit    1 month ago Type 2 diabetes mellitus with other specified complication, unspecified whether long term insulin use [facility])    [facility] End[facility] Aron Leo MD    Office Visit    3 months ago Bilateral impacted cerumen    [facility], [address], Sierra Hernandez MD    Office Visit    4 months ago Primary open angle glaucoma of both eyes, mild stage    [facility] Ophthal[facility] Shanda Sanchez MD    Office Visit    5 months ago Neoplasm of uncertain behavior of skin    [facility] Dermatology Rajan Grace MD    Office Visit            Future Appointments         Provider Department Appt Notes    In 3 days Shanda Sanchez MD [facility] Ophthalmology EP/VF, OCT and DM EE    In 1 month Yeison Gant, [facility] [place], [address] cysto bladder ca     In 2 months Aron Leo, [facility] Endocrinology 4 m f/u

## 2022-02-03 ENCOUNTER — OFFICE VISIT (OUTPATIENT)
Dept: OPHTHALMOLOGY | Facility: CLINIC | Age: 72
End: 2022-02-03
Payer: MEDICARE

## 2022-02-03 DIAGNOSIS — H25.13 AGE-RELATED NUCLEAR CATARACT OF BOTH EYES: ICD-10-CM

## 2022-02-03 DIAGNOSIS — H40.1131 PRIMARY OPEN ANGLE GLAUCOMA OF BOTH EYES, MILD STAGE: Primary | ICD-10-CM

## 2022-02-03 DIAGNOSIS — E11.9 TYPE 2 DIABETES MELLITUS WITHOUT RETINOPATHY (HCC): ICD-10-CM

## 2022-02-03 PROCEDURE — 92133 CPTRZD OPH DX IMG PST SGM ON: CPT | Performed by: OPHTHALMOLOGY

## 2022-02-03 PROCEDURE — 92083 EXTENDED VISUAL FIELD XM: CPT | Performed by: OPHTHALMOLOGY

## 2022-02-03 PROCEDURE — 92014 COMPRE OPH EXAM EST PT 1/>: CPT | Performed by: OPHTHALMOLOGY

## 2022-02-03 RX ORDER — LATANOPROST 50 UG/ML
SOLUTION/ DROPS OPHTHALMIC
Qty: 3 EACH | Refills: 3 | Status: SHIPPED | OUTPATIENT
Start: 2022-02-03

## 2022-02-03 NOTE — PATIENT INSTRUCTIONS
Primary open angle glaucoma of both eyes, mild stage  Visual field and OCT completed in office today with stable results that were discussed with patient in office today. IOP is stable. Continue Latanoprost; one drop in both eyes at bedtime. Will have patient back in 4 months for pressure check. Age-related nuclear cataract of both eyes  Discussed early cataracts with patient. Told patient that cataracts are age appropriate and they are not surgical at this time. No treatment recommended at this time. Type 2 diabetes mellitus without retinopathy ([address]  Diabetes type II: no background of retinopathy, no signs of neovascularization noted. Discussed ocular and systemic benefits of blood sugar control. Diagnosis and treatment discussed in detail with patient.

## 2022-02-07 ENCOUNTER — TELEPHONE (OUTPATIENT)
Dept: INTERNAL MEDICINE CLINIC | Facility: CLINIC | Age: 72
End: 2022-02-07

## 2022-02-07 ENCOUNTER — PATIENT MESSAGE (OUTPATIENT)
Dept: ENDOCRINOLOGY CLINIC | Facility: CLINIC | Age: 72
End: 2022-02-07

## 2022-02-07 RX ORDER — LISINOPRIL 40 MG/1
TABLET ORAL
Qty: 90 TABLET | Refills: 0 | Status: SHIPPED | OUTPATIENT
Start: 2022-02-07 | End: 2022-02-14

## 2022-02-07 RX ORDER — SITAGLIPTIN 100 MG/1
TABLET, FILM COATED ORAL
Qty: 90 TABLET | Refills: 0 | Status: SHIPPED | OUTPATIENT
Start: 2022-02-07 | End: 2022-02-14

## 2022-02-07 NOTE — TELEPHONE ENCOUNTER
----- Message from Rajivmocasie Jones sent at 2022 10:34 AM CST -----  Regarding: Lisinopril Refill  Good morning. I need a refill on my Lisinopril, 40mg medication. I have a follow-up appointment scheduled for . Thank you.   Titi Dean ( 1950)

## 2022-02-07 NOTE — TELEPHONE ENCOUNTER
From: Adwoa Orta  To: Jenna Mejias MD  Sent: 2/7/2022 11:10 AM CST  Subject: Lisinopril Refill    Good morning. I will be out of Lisinopril 40mg on February 23. Please order a refill for me at your earliest convenience. Thank you.     Fernanda Yap (bod 11/20/1950)

## 2022-02-14 RX ORDER — LISINOPRIL 40 MG/1
40 TABLET ORAL DAILY
Qty: 90 TABLET | Refills: 0 | Status: SHIPPED | OUTPATIENT
Start: 2022-02-14

## 2022-03-22 ENCOUNTER — TELEPHONE (OUTPATIENT)
Dept: SURGERY | Facility: CLINIC | Age: 72
End: 2022-03-22

## 2022-03-24 ENCOUNTER — PATIENT MESSAGE (OUTPATIENT)
Dept: ENDOCRINOLOGY CLINIC | Facility: CLINIC | Age: 72
End: 2022-03-24

## 2022-03-24 RX ORDER — METFORMIN HYDROCHLORIDE 500 MG/1
TABLET, EXTENDED RELEASE ORAL
Qty: 270 TABLET | Refills: 0 | Status: SHIPPED | OUTPATIENT
Start: 2022-03-24

## 2022-03-24 NOTE — TELEPHONE ENCOUNTER
From: Renee Christensen  To: Guillermo Dumont MD  Sent: 3/24/2022 10:13 AM CDT  Subject: Metformin    Good morning, Dr. Knight Back. I am currently taking 500mg Metformin with breakfast and dinner, and 250mg at lunch. My glucose numbers are running 160-180. Should I increase the lunch dose to 500mg from now until I see you later in April? My current supply of Metformin is almost gone, so please forward a prescription to [place] for either the current dosage or the new increased dosage. Thank You!     Brien Xiao ( 1950)

## 2022-03-27 RX ORDER — ATENOLOL 50 MG/1
50 TABLET ORAL 2 TIMES DAILY
Qty: 180 TABLET | Refills: 1 | Status: SHIPPED | OUTPATIENT
Start: 2022-03-27 | End: 2022-07-06

## 2022-03-27 NOTE — TELEPHONE ENCOUNTER
Refill passed per [address] protocol. Requested Prescriptions   Pending Prescriptions Disp Refills    ATENOLOL 50 MG Oral Tab [Pharmacy Med Name: ATENOLOL 50 MG TABLET] 180 tablet 1     Sig: TAKE 1 TABLET BY MOUTH TWICE A DAY        Hypertensive Medications Protocol Passed - 3/26/2022  4:44 PM        Passed - CMP or BMP in past 12 months        Passed - Appointment in past 6 or next 3 months        Passed - GFR Non- > 50     Lab Results   Component Value Date    GFRNAA 79 08/20/2021                     Recent Outpatient Visits              1 month ago Primary open angle glaucoma of both eyes, mild stage    [facility] Ophthal[facility] Bela Choudhury MD    Office Visit    1 month ago Bilateral impacted cerumen    [facility], [address], Dayday Hernandez MD    Office Visit    3 months ago Type 2 diabetes mellitus with other specified complication, unspecified whether long term insulin use [facility])    [address] Endocrinology Tanya Morejon MD    Office Visit    5 months ago Bilateral impacted cerumen    [facility], [address], Dayday Hernandez MD    Office Visit    6 months ago Primary open angle glaucoma of both eyes, mild stage    [facility] Bela Choudhury MD    Office Visit          Future Appointments         Provider Department Appt Notes    Tomorrow Michael Ortiz MD [facility], [address] [facility]     In 3 weeks Tanya Morejon, [address] [facility] Endocrinology 4 m f/u    In 1 month Addison Purcell MD [address], [address] Follow up on having COVID in January, and also check on BP issues.     In 2 months Bela Choudhury MD [facility] Ophthal[facility] EP/ 4 mos IOP

## 2022-03-28 ENCOUNTER — PROCEDURE (OUTPATIENT)
Dept: SURGERY | Facility: CLINIC | Age: 72
End: 2022-03-28
Payer: MEDICARE

## 2022-03-28 VITALS
BODY MASS INDEX: 30 KG/M2 | DIASTOLIC BLOOD PRESSURE: 70 MMHG | WEIGHT: 196 LBS | HEART RATE: 63 BPM | SYSTOLIC BLOOD PRESSURE: 151 MMHG

## 2022-03-28 DIAGNOSIS — N32.0 BLADDER NECK OBSTRUCTION: ICD-10-CM

## 2022-03-28 DIAGNOSIS — R39.12 BENIGN PROSTATIC HYPERPLASIA WITH WEAK URINARY STREAM: ICD-10-CM

## 2022-03-28 DIAGNOSIS — Z87.442 HISTORY OF KIDNEY STONES: ICD-10-CM

## 2022-03-28 DIAGNOSIS — N47.1 PHIMOSIS: ICD-10-CM

## 2022-03-28 DIAGNOSIS — C67.2 MALIGNANT NEOPLASM OF LATERAL WALL OF URINARY BLADDER (HCC): Primary | ICD-10-CM

## 2022-03-28 DIAGNOSIS — R97.20 ELEVATED PSA: ICD-10-CM

## 2022-03-28 DIAGNOSIS — N28.1 COMPLEX RENAL CYST: ICD-10-CM

## 2022-03-28 DIAGNOSIS — N40.1 BENIGN PROSTATIC HYPERPLASIA WITH WEAK URINARY STREAM: ICD-10-CM

## 2022-03-28 DIAGNOSIS — R35.1 NOCTURIA: ICD-10-CM

## 2022-03-28 PROCEDURE — 99214 OFFICE O/P EST MOD 30 MIN: CPT | Performed by: UROLOGY

## 2022-03-28 PROCEDURE — 52000 CYSTOURETHROSCOPY: CPT | Performed by: UROLOGY

## 2022-03-28 RX ORDER — CIPROFLOXACIN 500 MG/1
500 TABLET, FILM COATED ORAL ONCE
Status: SHIPPED | OUTPATIENT
Start: 2022-03-28

## 2022-03-28 RX ORDER — DIAZEPAM 10 MG/1
TABLET ORAL
Qty: 1 TABLET | Refills: 0 | Status: SHIPPED | OUTPATIENT
Start: 2022-03-28

## 2022-03-29 LAB — NON GYNE INTERPRETATION: NEGATIVE

## 2022-04-17 ENCOUNTER — IMMUNIZATION (OUTPATIENT)
Dept: LAB | Age: 72
End: 2022-04-17
Attending: EMERGENCY MEDICINE
Payer: MEDICARE

## 2022-04-17 DIAGNOSIS — Z23 NEED FOR VACCINATION: Primary | ICD-10-CM

## 2022-04-17 PROCEDURE — 0054A SARSCOV2 VAC 30MCG TRS SUCR: CPT

## 2022-05-02 ENCOUNTER — OFFICE VISIT (OUTPATIENT)
Dept: INTERNAL MEDICINE CLINIC | Facility: CLINIC | Age: 72
End: 2022-05-02
Payer: MEDICARE

## 2022-05-02 VITALS
SYSTOLIC BLOOD PRESSURE: 134 MMHG | BODY MASS INDEX: 29.4 KG/M2 | DIASTOLIC BLOOD PRESSURE: 64 MMHG | HEART RATE: 58 BPM | WEIGHT: 194 LBS | RESPIRATION RATE: 20 BRPM | TEMPERATURE: 98 F | HEIGHT: 68 IN

## 2022-05-02 DIAGNOSIS — I10 ESSENTIAL HYPERTENSION: ICD-10-CM

## 2022-05-02 DIAGNOSIS — E78.5 HYPERLIPIDEMIA, UNSPECIFIED HYPERLIPIDEMIA TYPE: ICD-10-CM

## 2022-05-02 DIAGNOSIS — C67.9 MALIGNANT NEOPLASM OF URINARY BLADDER, UNSPECIFIED SITE (HCC): ICD-10-CM

## 2022-05-02 DIAGNOSIS — E11.9 TYPE 2 DIABETES MELLITUS WITHOUT COMPLICATION, WITHOUT LONG-TERM CURRENT USE OF INSULIN (HCC): Primary | ICD-10-CM

## 2022-05-02 DIAGNOSIS — Z13.6 ENCOUNTER FOR SCREENING FOR STENOSIS OF CAROTID ARTERY: ICD-10-CM

## 2022-05-02 LAB
CARTRIDGE LOT#: 918 NUMERIC
HEMOGLOBIN A1C: 7.3 % (ref 4.3–5.6)

## 2022-05-02 PROCEDURE — 83036 HEMOGLOBIN GLYCOSYLATED A1C: CPT | Performed by: INTERNAL MEDICINE

## 2022-05-02 PROCEDURE — 99214 OFFICE O/P EST MOD 30 MIN: CPT | Performed by: INTERNAL MEDICINE

## 2022-05-09 ENCOUNTER — TELEPHONE (OUTPATIENT)
Dept: INTERNAL MEDICINE CLINIC | Facility: CLINIC | Age: 72
End: 2022-05-09

## 2022-05-09 RX ORDER — LISINOPRIL 40 MG/1
TABLET ORAL
Qty: 90 TABLET | Refills: 0 | Status: SHIPPED | OUTPATIENT
Start: 2022-05-09

## 2022-05-09 NOTE — TELEPHONE ENCOUNTER
[place] calling from [facility] central scheduling stating that the current Diagnosis for pts Carotid US  does not pass, need updated order with new diagnosis code. pt appt is currently scheduled for 5/26. Please advise.

## 2022-05-10 NOTE — TELEPHONE ENCOUNTER
Please call the patient. We had discussed this possibility when we saw him in the office. He may need to pay for this himself.   As an alternate, he can find a [employer] screening service that does ultrasound evaluation of the carotid arteries probably for much cheaper than our lab will do it for

## 2022-05-16 RX ORDER — METFORMIN HYDROCHLORIDE 500 MG/1
500 TABLET, EXTENDED RELEASE ORAL
Qty: 270 TABLET | Refills: 0 | Status: SHIPPED | OUTPATIENT
Start: 2022-05-16 | End: 2022-07-05

## 2022-05-24 RX ORDER — SITAGLIPTIN 100 MG/1
TABLET, FILM COATED ORAL
Qty: 90 TABLET | Refills: 0 | Status: SHIPPED | OUTPATIENT
Start: 2022-05-24

## 2022-05-25 ENCOUNTER — TELEPHONE (OUTPATIENT)
Dept: OPHTHALMOLOGY | Facility: CLINIC | Age: 72
End: 2022-05-25

## 2022-05-25 ENCOUNTER — TELEPHONE (OUTPATIENT)
Dept: INTERNAL MEDICINE CLINIC | Facility: CLINIC | Age: 72
End: 2022-05-25

## 2022-05-25 RX ORDER — ICOSAPENT ETHYL 1000 MG/1
1 CAPSULE ORAL 2 TIMES DAILY
Qty: 180 CAPSULE | Refills: 1 | Status: SHIPPED | OUTPATIENT
Start: 2022-05-25

## 2022-05-25 NOTE — TELEPHONE ENCOUNTER
Refill passed per [address] protocol. Requested Prescriptions   Pending Prescriptions Disp Refills    Icosapent Ethyl (VASCEPA) 1 g Oral Cap 180 capsule 1     Sig: Take 1 capsule by mouth 2 (two) times daily.         Cholesterol Medication Protocol Passed - 5/25/2022 12:25 PM        Passed - ALT in past 12 months        Passed - LDL in past 12 months        Passed - Last ALT < 80       Lab Results   Component Value Date    ALT 64 (H) 08/20/2021             Passed - Last LDL < 130     Lab Results   Component Value Date    LDL 79 07/14/2021               Passed - Appointment in past 12 or next 3 months            Recent Outpatient Visits              3 weeks ago Type 2 diabetes mellitus without complication, without long-term current use of insulin [facility])    [address], [address], Maria Mclaughlin MD    Office Visit    3 months ago Primary open angle glaucoma of both eyes, mild stage    [facility] Ophthal[facility] Libby Maxwell MD    Office Visit    3 months ago Bilateral impacted cerumen    [facility], [address], Kaur Hernandez MD    Office Visit    5 months ago Type 2 diabetes mellitus with other specified complication, unspecified whether long term insulin use [facility])    [address] Endocrinology Rodolfo De Souza MD    Office Visit    7 months ago Bilateral impacted cerumen    [facility], [address], Kaur Hernandez MD    Office Visit          Future Appointments         Provider Department Appt Notes    In 2 weeks Libby Maxwell MD [facility] Ophthalmology EP/ IOP check     In 1 month Rodolfo De Souza MD [address] Endocrinology Current A1C and other diabetes concerns./ informed of policies    In 4 months Eloise Kennedy MD [facility], [address] cystoscopy    In 5 months Radha Pretty MD [address], [address], [facility] 6 mo f/u

## 2022-05-25 NOTE — TELEPHONE ENCOUNTER
Patient requesting to reschedule appointment from  06/07 for IOP check. Patient unavailable that day.  Please advise

## 2022-05-25 NOTE — TELEPHONE ENCOUNTER
----- Message from Wiliam Del Valle RN sent at 2022 12:09 PM CDT -----  Regarding: FW: Vascepa      ----- Message -----  From: Mony Mcfadden  Sent: 2022  10:11 AM CDT  To: [facility] Rn Triage  Subject: Vascepa                                          Good morning, Dr. Franck Ramirez. I need a fresh prescription for Vascepa - 1 gram capsule - 1 in the morning, 1 in the evening. I have 1 refill remaining at [facility], but I have moved my  pharmacy to [place] in [address] [place] [address]. Thanks for your help.     Daniel gunter - 1950

## 2022-05-26 NOTE — TELEPHONE ENCOUNTER
Approved    Prior authorization approved Case ID: [id number]      Payer:  [address]   [phone]  Rebeka Fatima  [phone]    [id number]; Review Type:Prior Auth; Coverage Start Date:04/26/2022; Coverage End Date:05/26/2023;     Approval Details    Authorized from April 26, 2022 to May 26, 2023      Electronic appeal:  Not supported   View History

## 2022-05-26 NOTE — TELEPHONE ENCOUNTER
Prior authorization for vascepa was done through [employer].  It can take 1-5 business days for a decision to come back

## 2022-06-09 ENCOUNTER — OFFICE VISIT (OUTPATIENT)
Dept: OPHTHALMOLOGY | Facility: CLINIC | Age: 72
End: 2022-06-09
Payer: MEDICARE

## 2022-06-09 DIAGNOSIS — H40.1131 PRIMARY OPEN ANGLE GLAUCOMA OF BOTH EYES, MILD STAGE: Primary | ICD-10-CM

## 2022-06-09 PROCEDURE — 99213 OFFICE O/P EST LOW 20 MIN: CPT | Performed by: OPHTHALMOLOGY

## 2022-06-09 PROCEDURE — 1126F AMNT PAIN NOTED NONE PRSNT: CPT | Performed by: OPHTHALMOLOGY

## 2022-06-09 NOTE — ASSESSMENT & PLAN NOTE
IOP is stable. Continue Latanoprost; one drop in both eyes at bedtime. Will have patient back in 4 months for pressure check.

## 2022-06-09 NOTE — PATIENT INSTRUCTIONS
Primary open angle glaucoma of both eyes, mild stage  IOP is stable. Continue Latanoprost; one drop in both eyes at bedtime. Will have patient back in 4 months for pressure check.

## 2022-06-10 ENCOUNTER — OFFICE VISIT (OUTPATIENT)
Dept: OTOLARYNGOLOGY | Facility: CLINIC | Age: 72
End: 2022-06-10
Payer: MEDICARE

## 2022-06-10 VITALS — TEMPERATURE: 96 F

## 2022-06-10 DIAGNOSIS — H61.23 BILATERAL IMPACTED CERUMEN: Primary | ICD-10-CM

## 2022-06-10 PROCEDURE — 69210 REMOVE IMPACTED EAR WAX UNI: CPT | Performed by: OTOLARYNGOLOGY

## 2022-06-27 ENCOUNTER — OFFICE VISIT (OUTPATIENT)
Dept: ENDOCRINOLOGY CLINIC | Facility: CLINIC | Age: 72
End: 2022-06-27
Payer: MEDICARE

## 2022-06-27 VITALS
HEART RATE: 58 BPM | WEIGHT: 196 LBS | DIASTOLIC BLOOD PRESSURE: 78 MMHG | BODY MASS INDEX: 30 KG/M2 | SYSTOLIC BLOOD PRESSURE: 155 MMHG

## 2022-06-27 DIAGNOSIS — E03.8 SUBCLINICAL HYPOTHYROIDISM: ICD-10-CM

## 2022-06-27 DIAGNOSIS — E11.69 TYPE 2 DIABETES MELLITUS WITH OTHER SPECIFIED COMPLICATION, UNSPECIFIED WHETHER LONG TERM INSULIN USE (HCC): Primary | ICD-10-CM

## 2022-06-27 LAB
GLUCOSE BLOOD: 192
TEST STRIP LOT #: NORMAL NUMERIC

## 2022-06-27 PROCEDURE — 99214 OFFICE O/P EST MOD 30 MIN: CPT | Performed by: INTERNAL MEDICINE

## 2022-06-27 PROCEDURE — 82947 ASSAY GLUCOSE BLOOD QUANT: CPT | Performed by: INTERNAL MEDICINE

## 2022-07-05 RX ORDER — METFORMIN HYDROCHLORIDE 500 MG/1
TABLET, EXTENDED RELEASE ORAL
Qty: 270 TABLET | Refills: 0 | Status: SHIPPED | OUTPATIENT
Start: 2022-07-05 | End: 2022-07-08

## 2022-07-06 ENCOUNTER — PATIENT MESSAGE (OUTPATIENT)
Dept: INTERNAL MEDICINE CLINIC | Facility: CLINIC | Age: 72
End: 2022-07-06

## 2022-07-06 RX ORDER — ATENOLOL 50 MG/1
50 TABLET ORAL 2 TIMES DAILY
Qty: 180 TABLET | Refills: 1 | Status: SHIPPED | OUTPATIENT
Start: 2022-07-06

## 2022-07-07 NOTE — TELEPHONE ENCOUNTER
Dr. Aiyana Mcintosh, patient is requesting refill for Atenolol  Last refill: 3/27/22  Last office visit: 5/2/22

## 2022-07-07 NOTE — TELEPHONE ENCOUNTER
Gilbert Gaming RN 2022 5:10 PM CDT        ----- Message -----  From: Noemi Nath  Sent: 2022 3:25 PM CDT  To: [facility] T[facility]  Subject: Atenolol 50mg refill     Hello. Could you please issue a fresh prescription to [facility] in [place] for a 3-month supply (2 tablets per day) of Atenolol 50 mg. [facility] is awaiting your approval of this medication, and I am completely out as of this morning. Thanks for your help. Rob Friedman   1950

## 2022-07-08 RX ORDER — METFORMIN HYDROCHLORIDE 500 MG/1
TABLET, EXTENDED RELEASE ORAL
Qty: 270 TABLET | Refills: 0 | Status: SHIPPED | OUTPATIENT
Start: 2022-07-08

## 2022-08-02 RX ORDER — LISINOPRIL 40 MG/1
TABLET ORAL
Qty: 90 TABLET | Refills: 0 | Status: SHIPPED | OUTPATIENT
Start: 2022-08-02

## 2022-08-16 ENCOUNTER — TELEPHONE (OUTPATIENT)
Dept: ENDOCRINOLOGY CLINIC | Facility: CLINIC | Age: 72
End: 2022-08-16

## 2022-08-16 ENCOUNTER — LAB ENCOUNTER (OUTPATIENT)
Dept: LAB | Facility: HOSPITAL | Age: 72
End: 2022-08-16
Attending: INTERNAL MEDICINE
Payer: MEDICARE

## 2022-08-16 DIAGNOSIS — E03.8 SUBCLINICAL HYPOTHYROIDISM: ICD-10-CM

## 2022-08-16 DIAGNOSIS — E11.69 TYPE 2 DIABETES MELLITUS WITH OTHER SPECIFIED COMPLICATION, UNSPECIFIED WHETHER LONG TERM INSULIN USE (HCC): ICD-10-CM

## 2022-08-16 DIAGNOSIS — R97.20 ELEVATED PSA: ICD-10-CM

## 2022-08-16 DIAGNOSIS — C67.9 MALIGNANT NEOPLASM OF URINARY BLADDER, UNSPECIFIED SITE (HCC): ICD-10-CM

## 2022-08-16 DIAGNOSIS — E78.5 DYSLIPIDEMIA: ICD-10-CM

## 2022-08-16 DIAGNOSIS — C67.2 MALIGNANT NEOPLASM OF LATERAL WALL OF URINARY BLADDER (HCC): ICD-10-CM

## 2022-08-16 LAB
ALBUMIN SERPL-MCNC: 3.7 G/DL (ref 3.4–5)
ALBUMIN/GLOB SERPL: 1.1 {RATIO} (ref 1–2)
ALP LIVER SERPL-CCNC: 41 U/L
ALT SERPL-CCNC: 58 U/L
ANION GAP SERPL CALC-SCNC: 6 MMOL/L (ref 0–18)
AST SERPL-CCNC: 28 U/L (ref 15–37)
BILIRUB SERPL-MCNC: 0.7 MG/DL (ref 0.1–2)
BUN BLD-MCNC: 20 MG/DL (ref 7–18)
BUN/CREAT SERPL: 18.7 (ref 10–20)
CALCIUM BLD-MCNC: 9.4 MG/DL (ref 8.5–10.1)
CHLORIDE SERPL-SCNC: 99 MMOL/L (ref 98–112)
CHOLEST SERPL-MCNC: 167 MG/DL (ref ?–200)
CO2 SERPL-SCNC: 27 MMOL/L (ref 21–32)
CREAT BLD-MCNC: 1.07 MG/DL
CREAT UR-SCNC: 54.1 MG/DL
FASTING PATIENT LIPID ANSWER: YES
FASTING STATUS PATIENT QL REPORTED: YES
GFR SERPLBLD BASED ON 1.73 SQ M-ARVRAT: 73 ML/MIN/1.73M2 (ref 60–?)
GLOBULIN PLAS-MCNC: 3.4 G/DL (ref 2.8–4.4)
GLUCOSE BLD-MCNC: 169 MG/DL (ref 70–99)
HDLC SERPL-MCNC: 32 MG/DL (ref 40–59)
LDLC SERPL CALC-MCNC: 73 MG/DL (ref ?–100)
MICROALBUMIN UR-MCNC: 4.17 MG/DL
MICROALBUMIN/CREAT 24H UR-RTO: 77.1 UG/MG (ref ?–30)
NONHDLC SERPL-MCNC: 135 MG/DL (ref ?–130)
OSMOLALITY SERPL CALC.SUM OF ELEC: 281 MOSM/KG (ref 275–295)
POTASSIUM SERPL-SCNC: 4.5 MMOL/L (ref 3.5–5.1)
PROT SERPL-MCNC: 7.1 G/DL (ref 6.4–8.2)
PSA FREE MFR SERPL: 23 %
PSA FREE SERPL-MCNC: 1.05 NG/ML
PSA SERPL-MCNC: 4.47 NG/ML (ref ?–4)
SODIUM SERPL-SCNC: 132 MMOL/L (ref 136–145)
T4 FREE SERPL-MCNC: 0.9 NG/DL (ref 0.8–1.7)
TRIGL SERPL-MCNC: 388 MG/DL (ref 30–149)
TSI SER-ACNC: 4.7 MIU/ML (ref 0.36–3.74)
VLDLC SERPL CALC-MCNC: 60 MG/DL (ref 0–30)

## 2022-08-16 PROCEDURE — 84439 ASSAY OF FREE THYROXINE: CPT

## 2022-08-16 PROCEDURE — 80053 COMPREHEN METABOLIC PANEL: CPT

## 2022-08-16 PROCEDURE — 80061 LIPID PANEL: CPT

## 2022-08-16 PROCEDURE — 84443 ASSAY THYROID STIM HORMONE: CPT

## 2022-08-16 PROCEDURE — 84154 ASSAY OF PSA FREE: CPT

## 2022-08-16 PROCEDURE — 36415 COLL VENOUS BLD VENIPUNCTURE: CPT

## 2022-08-16 PROCEDURE — 82570 ASSAY OF URINE CREATININE: CPT

## 2022-08-16 PROCEDURE — 88108 CYTOPATH CONCENTRATE TECH: CPT

## 2022-08-16 PROCEDURE — 84153 ASSAY OF PSA TOTAL: CPT

## 2022-08-16 PROCEDURE — 82043 UR ALBUMIN QUANTITATIVE: CPT

## 2022-08-16 RX ORDER — LEVOTHYROXINE SODIUM 0.03 MG/1
25 TABLET ORAL
Qty: 90 TABLET | Refills: 0 | Status: SHIPPED | OUTPATIENT
Start: 2022-08-16

## 2022-08-16 NOTE — TELEPHONE ENCOUNTER
Ur. MA is lightly high --> good BP and Bg control    TG is high --> low fat diet      TSH is slightly high , has been high, stays stable  We has been monitoring this  We can start LT4 25 mcg daily and repeat labs in 6 weeks if he develops symptoms of hypothyroidism like weight gain, fatigue, cold intolerance, constipation    Thanks

## 2022-08-16 NOTE — TELEPHONE ENCOUNTER
rn called patient with message below , patient verbalized understanding of instructions  Agreed to start levothyroxine 25 mg  Instructions given to patient on how to take daily at least 30 min before eating and 4 hours away from supplements    rx sent to [facility]

## 2022-08-17 LAB — NON GYNE INTERPRETATION: NEGATIVE

## 2022-08-19 NOTE — TELEPHONE ENCOUNTER
Icosapent Ethyl (VASCEPA) 1 g Oral Cap, Take 1 capsule by mouth 2 (two) times daily. , Disp: 180 capsule, Rfl: 1      PATIENT IS REQUESTING A 90 DAY SUPPLY No - the patient is unable to be screened due to medical condition

## 2022-08-22 RX ORDER — ICOSAPENT ETHYL 1000 MG/1
1 CAPSULE ORAL 2 TIMES DAILY
Qty: 180 CAPSULE | Refills: 1 | Status: SHIPPED | OUTPATIENT
Start: 2022-08-22

## 2022-08-22 NOTE — TELEPHONE ENCOUNTER
LOV: 6/27/22    RTC: 4 Months    FU: No FU Appt Scheduled    Last Refill: 5/25/22    6 Month Supply Pending Below    MyChart message sent to schedule an appt

## 2022-08-26 ENCOUNTER — OFFICE VISIT (OUTPATIENT)
Dept: OTOLARYNGOLOGY | Facility: CLINIC | Age: 72
End: 2022-08-26
Payer: MEDICARE

## 2022-08-26 VITALS — HEIGHT: 68 IN | TEMPERATURE: 97 F | WEIGHT: 196 LBS | BODY MASS INDEX: 29.7 KG/M2

## 2022-08-26 DIAGNOSIS — H61.23 BILATERAL IMPACTED CERUMEN: Primary | ICD-10-CM

## 2022-08-26 PROCEDURE — 69210 REMOVE IMPACTED EAR WAX UNI: CPT | Performed by: OTOLARYNGOLOGY

## 2022-09-14 ENCOUNTER — PATIENT MESSAGE (OUTPATIENT)
Dept: INTERNAL MEDICINE CLINIC | Facility: CLINIC | Age: 72
End: 2022-09-14

## 2022-09-15 ENCOUNTER — OFFICE VISIT (OUTPATIENT)
Dept: DERMATOLOGY CLINIC | Facility: CLINIC | Age: 72
End: 2022-09-15
Payer: MEDICARE

## 2022-09-15 DIAGNOSIS — L81.4 LENTIGINES: ICD-10-CM

## 2022-09-15 DIAGNOSIS — B35.3 TINEA PEDIS OF BOTH FEET: ICD-10-CM

## 2022-09-15 DIAGNOSIS — B35.6 TINEA CRURIS: ICD-10-CM

## 2022-09-15 DIAGNOSIS — D22.9 MULTIPLE BENIGN NEVI: ICD-10-CM

## 2022-09-15 DIAGNOSIS — L82.1 SEBORRHEIC KERATOSES: ICD-10-CM

## 2022-09-15 DIAGNOSIS — L57.0 MULTIPLE ACTINIC KERATOSES: Primary | ICD-10-CM

## 2022-09-15 DIAGNOSIS — D18.01 CHERRY ANGIOMA: ICD-10-CM

## 2022-09-15 PROCEDURE — 17000 DESTRUCT PREMALG LESION: CPT | Performed by: STUDENT IN AN ORGANIZED HEALTH CARE EDUCATION/TRAINING PROGRAM

## 2022-09-15 PROCEDURE — 17003 DESTRUCT PREMALG LES 2-14: CPT | Performed by: STUDENT IN AN ORGANIZED HEALTH CARE EDUCATION/TRAINING PROGRAM

## 2022-09-15 PROCEDURE — 99214 OFFICE O/P EST MOD 30 MIN: CPT | Performed by: STUDENT IN AN ORGANIZED HEALTH CARE EDUCATION/TRAINING PROGRAM

## 2022-09-16 ENCOUNTER — TELEPHONE (OUTPATIENT)
Dept: INTERNAL MEDICINE CLINIC | Facility: CLINIC | Age: 72
End: 2022-09-16

## 2022-09-19 ENCOUNTER — PATIENT MESSAGE (OUTPATIENT)
Dept: INTERNAL MEDICINE CLINIC | Facility: CLINIC | Age: 72
End: 2022-09-19

## 2022-09-19 ENCOUNTER — PATIENT MESSAGE (OUTPATIENT)
Dept: SURGERY | Facility: CLINIC | Age: 72
End: 2022-09-19

## 2022-09-19 RX ORDER — ICOSAPENT ETHYL 1000 MG/1
1 CAPSULE ORAL 2 TIMES DAILY
Qty: 180 CAPSULE | Refills: 1 | Status: SHIPPED | OUTPATIENT
Start: 2022-09-19

## 2022-09-19 NOTE — TELEPHONE ENCOUNTER
Noted script was sent today    Icosapent Ethyl (VASCEPA) 1 g Oral Cap 180 capsule 1 9/19/2022     Sig - Route:  Take 1 capsule by mouth 2 (two) times daily. - Oral    Sent to pharmacy as: Icosapent Ethyl 1 GM Oral Capsule (Vascepa)    E-Prescribing Status: Receipt confirmed by pharmacy (9/19/2022 11:51 AM CDT)    Prior authorization: Approved

## 2022-09-19 NOTE — TELEPHONE ENCOUNTER
[facility] requesting medication to be changed to generic form - Icosapent capsules - it would be cheaper option for the patient.

## 2022-09-20 ENCOUNTER — PATIENT MESSAGE (OUTPATIENT)
Dept: INTERNAL MEDICINE CLINIC | Facility: CLINIC | Age: 72
End: 2022-09-20

## 2022-09-20 DIAGNOSIS — I10 ESSENTIAL HYPERTENSION: ICD-10-CM

## 2022-09-20 NOTE — TELEPHONE ENCOUNTER
From: Ricky Rai  To: Tammie Wallace MD  Sent: 2022 3:37 PM CDT  Subject: Upcoming cystoscopy    Dr. Beth Degroot had me stop taking Vascepa for 10 days before the procedure. Does Dr. Alie Brennan want me also to stop taking the Vascepa befroe the procedure. I took one with my morning meds after breakfast.  Also, I would love to have Gennaro Ellis or Gato Ramirez as my nurse for the procedure. They are excellent.     Bill Ward   1950

## 2022-09-21 RX ORDER — AMLODIPINE BESYLATE 5 MG/1
5 TABLET ORAL 2 TIMES DAILY
Qty: 180 TABLET | Refills: 1 | Status: SHIPPED | OUTPATIENT
Start: 2022-09-21

## 2022-09-21 NOTE — TELEPHONE ENCOUNTER
From: Joon Boateng  To: Robert Steele MD  Sent: 2022 4:56 PM CDT  Subject: Amlodipine 5 mg twice a day    Hello. I am almost out of Amlodipine and have no refills available. Would you issue a new prescription for a 90 day supply to [facility] [place], please.     Thank you  Kari Cummings   1950

## 2022-09-26 DIAGNOSIS — I10 ESSENTIAL HYPERTENSION: ICD-10-CM

## 2022-09-27 RX ORDER — AMLODIPINE BESYLATE 5 MG/1
TABLET ORAL
Qty: 180 TABLET | Refills: 1 | OUTPATIENT
Start: 2022-09-27

## 2022-09-27 NOTE — TELEPHONE ENCOUNTER
[employer] message sent for preferred pharmacy clarification. It was sent 9/21/22 to [facility] and now [facility] is requesting for the refill.

## 2022-09-28 ENCOUNTER — TELEPHONE (OUTPATIENT)
Dept: ENDOCRINOLOGY CLINIC | Facility: CLINIC | Age: 72
End: 2022-09-28

## 2022-09-28 ENCOUNTER — LAB ENCOUNTER (OUTPATIENT)
Dept: LAB | Facility: HOSPITAL | Age: 72
End: 2022-09-28
Attending: INTERNAL MEDICINE
Payer: MEDICARE

## 2022-09-28 DIAGNOSIS — E11.69 TYPE 2 DIABETES MELLITUS WITH OTHER SPECIFIED COMPLICATION, UNSPECIFIED WHETHER LONG TERM INSULIN USE (HCC): ICD-10-CM

## 2022-09-28 DIAGNOSIS — E03.8 SUBCLINICAL HYPOTHYROIDISM: Primary | ICD-10-CM

## 2022-09-28 DIAGNOSIS — E03.9 HYPOTHYROIDISM, UNSPECIFIED TYPE: ICD-10-CM

## 2022-09-28 LAB — TSI SER-ACNC: 2.59 MIU/ML (ref 0.36–3.74)

## 2022-09-28 PROCEDURE — 36415 COLL VENOUS BLD VENIPUNCTURE: CPT

## 2022-09-28 PROCEDURE — 84443 ASSAY THYROID STIM HORMONE: CPT

## 2022-09-28 NOTE — TELEPHONE ENCOUNTER
Dr. Prasanna Rivas    Please advise. Thyroid lab orders pended.  Patient has an appointment 10/31/22

## 2022-09-29 ENCOUNTER — PROCEDURE (OUTPATIENT)
Dept: SURGERY | Facility: CLINIC | Age: 72
End: 2022-09-29

## 2022-09-29 VITALS
BODY MASS INDEX: 30 KG/M2 | HEART RATE: 61 BPM | WEIGHT: 196 LBS | DIASTOLIC BLOOD PRESSURE: 77 MMHG | SYSTOLIC BLOOD PRESSURE: 175 MMHG

## 2022-09-29 DIAGNOSIS — N40.1 BENIGN PROSTATIC HYPERPLASIA WITH WEAK URINARY STREAM: ICD-10-CM

## 2022-09-29 DIAGNOSIS — C67.2 MALIGNANT NEOPLASM OF LATERAL WALL OF URINARY BLADDER (HCC): Primary | ICD-10-CM

## 2022-09-29 DIAGNOSIS — Z12.5 SCREENING PSA (PROSTATE SPECIFIC ANTIGEN): ICD-10-CM

## 2022-09-29 DIAGNOSIS — R39.12 BENIGN PROSTATIC HYPERPLASIA WITH WEAK URINARY STREAM: ICD-10-CM

## 2022-09-29 PROCEDURE — 52000 CYSTOURETHROSCOPY: CPT | Performed by: UROLOGY

## 2022-09-29 PROCEDURE — 99213 OFFICE O/P EST LOW 20 MIN: CPT | Performed by: UROLOGY

## 2022-09-29 RX ORDER — CIPROFLOXACIN 500 MG/1
500 TABLET, FILM COATED ORAL ONCE
Status: COMPLETED | OUTPATIENT
Start: 2022-09-29 | End: 2022-09-29

## 2022-09-29 RX ADMIN — CIPROFLOXACIN 500 MG: 500 TABLET, FILM COATED ORAL at 11:02:00

## 2022-10-23 RX ORDER — LISINOPRIL 40 MG/1
TABLET ORAL
Qty: 90 TABLET | Refills: 0 | Status: SHIPPED | OUTPATIENT
Start: 2022-10-23

## 2022-10-25 ENCOUNTER — OFFICE VISIT (OUTPATIENT)
Dept: OPHTHALMOLOGY | Facility: CLINIC | Age: 72
End: 2022-10-25
Payer: MEDICARE

## 2022-10-25 DIAGNOSIS — H40.1131 PRIMARY OPEN ANGLE GLAUCOMA OF BOTH EYES, MILD STAGE: Primary | ICD-10-CM

## 2022-10-25 PROCEDURE — 1126F AMNT PAIN NOTED NONE PRSNT: CPT | Performed by: OPHTHALMOLOGY

## 2022-10-25 PROCEDURE — 99213 OFFICE O/P EST LOW 20 MIN: CPT | Performed by: OPHTHALMOLOGY

## 2022-10-25 NOTE — PATIENT INSTRUCTIONS
Primary open angle glaucoma of both eyes, mild stage  IOP is stable. Continue Latanoprost; one drop in both eyes at bedtime.     Will have patient back in 4 months for a VF, OCT, diabetic exam and photos

## 2022-10-25 NOTE — ASSESSMENT & PLAN NOTE
IOP is stable. Continue Latanoprost; one drop in both eyes at bedtime.     Will have patient back in 4 months for a VF, OCT, diabetic exam and photos

## 2022-10-27 ENCOUNTER — OFFICE VISIT (OUTPATIENT)
Dept: DERMATOLOGY CLINIC | Facility: CLINIC | Age: 72
End: 2022-10-27
Payer: MEDICARE

## 2022-10-27 DIAGNOSIS — B35.4 TINEA CORPORIS: ICD-10-CM

## 2022-10-27 DIAGNOSIS — B35.3 TINEA PEDIS OF BOTH FEET: Primary | ICD-10-CM

## 2022-10-27 PROCEDURE — 1126F AMNT PAIN NOTED NONE PRSNT: CPT | Performed by: STUDENT IN AN ORGANIZED HEALTH CARE EDUCATION/TRAINING PROGRAM

## 2022-10-27 PROCEDURE — 99213 OFFICE O/P EST LOW 20 MIN: CPT | Performed by: STUDENT IN AN ORGANIZED HEALTH CARE EDUCATION/TRAINING PROGRAM

## 2022-10-27 RX ORDER — KETOCONAZOLE 20 MG/G
CREAM TOPICAL
Qty: 60 G | Refills: 11 | Status: SHIPPED | OUTPATIENT
Start: 2022-10-27

## 2022-10-31 ENCOUNTER — OFFICE VISIT (OUTPATIENT)
Dept: ENDOCRINOLOGY CLINIC | Facility: CLINIC | Age: 72
End: 2022-10-31
Payer: MEDICARE

## 2022-10-31 VITALS
DIASTOLIC BLOOD PRESSURE: 74 MMHG | WEIGHT: 197 LBS | HEART RATE: 62 BPM | BODY MASS INDEX: 30 KG/M2 | SYSTOLIC BLOOD PRESSURE: 136 MMHG

## 2022-10-31 DIAGNOSIS — E11.69 TYPE 2 DIABETES MELLITUS WITH OTHER SPECIFIED COMPLICATION, UNSPECIFIED WHETHER LONG TERM INSULIN USE (HCC): Primary | ICD-10-CM

## 2022-10-31 DIAGNOSIS — E78.5 DYSLIPIDEMIA: ICD-10-CM

## 2022-10-31 PROCEDURE — 99213 OFFICE O/P EST LOW 20 MIN: CPT | Performed by: INTERNAL MEDICINE

## 2022-10-31 RX ORDER — METFORMIN HYDROCHLORIDE 500 MG/1
1000 TABLET, EXTENDED RELEASE ORAL 2 TIMES DAILY WITH MEALS
Qty: 360 TABLET | Refills: 0 | Status: SHIPPED | OUTPATIENT
Start: 2022-10-31

## 2022-10-31 RX ORDER — LEVOTHYROXINE SODIUM 0.03 MG/1
25 TABLET ORAL
Qty: 90 TABLET | Refills: 1 | Status: SHIPPED | OUTPATIENT
Start: 2022-10-31

## 2022-11-02 ENCOUNTER — HOSPITAL ENCOUNTER (OUTPATIENT)
Dept: GENERAL RADIOLOGY | Facility: HOSPITAL | Age: 72
Discharge: HOME | End: 2022-11-02
Attending: INTERNAL MEDICINE
Payer: MEDICARE

## 2022-11-02 ENCOUNTER — OFFICE VISIT (OUTPATIENT)
Dept: INTERNAL MEDICINE CLINIC | Facility: CLINIC | Age: 72
End: 2022-11-02
Payer: MEDICARE

## 2022-11-02 ENCOUNTER — PATIENT MESSAGE (OUTPATIENT)
Dept: ENDOCRINOLOGY CLINIC | Facility: CLINIC | Age: 72
End: 2022-11-02

## 2022-11-02 VITALS
DIASTOLIC BLOOD PRESSURE: 74 MMHG | HEIGHT: 68 IN | TEMPERATURE: 98 F | RESPIRATION RATE: 20 BRPM | HEART RATE: 54 BPM | WEIGHT: 196 LBS | BODY MASS INDEX: 29.7 KG/M2 | SYSTOLIC BLOOD PRESSURE: 150 MMHG

## 2022-11-02 DIAGNOSIS — M25.561 CHRONIC PAIN OF RIGHT KNEE: ICD-10-CM

## 2022-11-02 DIAGNOSIS — Z23 NEED FOR VACCINATION: ICD-10-CM

## 2022-11-02 DIAGNOSIS — G89.29 CHRONIC PAIN OF RIGHT KNEE: ICD-10-CM

## 2022-11-02 DIAGNOSIS — E78.5 HYPERLIPIDEMIA, UNSPECIFIED HYPERLIPIDEMIA TYPE: ICD-10-CM

## 2022-11-02 DIAGNOSIS — E11.9 TYPE 2 DIABETES MELLITUS WITHOUT COMPLICATION, WITHOUT LONG-TERM CURRENT USE OF INSULIN (HCC): ICD-10-CM

## 2022-11-02 DIAGNOSIS — I10 ESSENTIAL HYPERTENSION: Primary | ICD-10-CM

## 2022-11-02 PROCEDURE — 90662 IIV NO PRSV INCREASED AG IM: CPT | Performed by: INTERNAL MEDICINE

## 2022-11-02 PROCEDURE — G0008 ADMIN INFLUENZA VIRUS VAC: HCPCS | Performed by: INTERNAL MEDICINE

## 2022-11-02 PROCEDURE — 99214 OFFICE O/P EST MOD 30 MIN: CPT | Performed by: INTERNAL MEDICINE

## 2022-11-02 PROCEDURE — 1126F AMNT PAIN NOTED NONE PRSNT: CPT | Performed by: INTERNAL MEDICINE

## 2022-11-02 PROCEDURE — 73564 X-RAY EXAM KNEE 4 OR MORE: CPT | Performed by: INTERNAL MEDICINE

## 2022-11-02 RX ORDER — CLONIDINE HYDROCHLORIDE 0.1 MG/1
0.1 TABLET ORAL 2 TIMES DAILY
Qty: 60 TABLET | Refills: 5 | Status: SHIPPED | OUTPATIENT
Start: 2022-11-02 | End: 2022-11-03 | Stop reason: SINTOL

## 2022-11-02 NOTE — TELEPHONE ENCOUNTER
From: Savannah Fields  To: Oumar Bradley MD  Sent: 2022 8:01 AM CDT  Subject: New Prescriptions    Good morning, Dr. Alyce Whitman. We decided on 2 prescriptions after my appointment on Monday. They are:  Levothyoxine, . 025mg, 1 per day before breakfast. 90 day supply. Metformin, 1gm, 4 times a day. 90 day supply. I have not received a text from [facility] in [address] [place] [address] that they are ready, so I want to check and make sure the prescriptions were sent to them. I will run out of Metformin after tomorrow. Thanks for your help.   Viviane Garcia  : 1950

## 2022-11-03 ENCOUNTER — OFFICE VISIT (OUTPATIENT)
Dept: INTERNAL MEDICINE CLINIC | Facility: CLINIC | Age: 72
End: 2022-11-03
Payer: MEDICARE

## 2022-11-03 ENCOUNTER — NURSE TRIAGE (OUTPATIENT)
Dept: INTERNAL MEDICINE CLINIC | Facility: CLINIC | Age: 72
End: 2022-11-03

## 2022-11-03 VITALS
HEART RATE: 54 BPM | SYSTOLIC BLOOD PRESSURE: 108 MMHG | HEIGHT: 68 IN | WEIGHT: 197 LBS | DIASTOLIC BLOOD PRESSURE: 61 MMHG | BODY MASS INDEX: 29.86 KG/M2

## 2022-11-03 DIAGNOSIS — I10 ESSENTIAL HYPERTENSION: Primary | ICD-10-CM

## 2022-11-03 PROCEDURE — 99213 OFFICE O/P EST LOW 20 MIN: CPT | Performed by: INTERNAL MEDICINE

## 2022-11-03 RX ORDER — HYDRALAZINE HYDROCHLORIDE 10 MG/1
10 TABLET, FILM COATED ORAL 3 TIMES DAILY
Qty: 90 TABLET | Refills: 1 | Status: SHIPPED | OUTPATIENT
Start: 2022-11-03

## 2022-11-09 ENCOUNTER — OFFICE VISIT (OUTPATIENT)
Dept: FAMILY MEDICINE CLINIC | Facility: CLINIC | Age: 72
End: 2022-11-09
Payer: MEDICARE

## 2022-11-09 VITALS
WEIGHT: 195.25 LBS | RESPIRATION RATE: 16 BRPM | HEIGHT: 68 IN | OXYGEN SATURATION: 99 % | SYSTOLIC BLOOD PRESSURE: 150 MMHG | HEART RATE: 60 BPM | BODY MASS INDEX: 29.59 KG/M2 | TEMPERATURE: 97 F | DIASTOLIC BLOOD PRESSURE: 58 MMHG

## 2022-11-09 DIAGNOSIS — J06.9 UPPER RESPIRATORY TRACT INFECTION, UNSPECIFIED TYPE: Primary | ICD-10-CM

## 2022-11-09 LAB
FLUAV + FLUBV RNA SPEC NAA+PROBE: NOT DETECTED
FLUAV + FLUBV RNA SPEC NAA+PROBE: NOT DETECTED
RSV RNA SPEC NAA+PROBE: NOT DETECTED
SARS-COV-2 RNA RESP QL NAA+PROBE: NOT DETECTED

## 2022-11-09 PROCEDURE — 99202 OFFICE O/P NEW SF 15 MIN: CPT | Performed by: NURSE PRACTITIONER

## 2022-11-09 PROCEDURE — 87637 SARSCOV2&INF A&B&RSV AMP PRB: CPT | Performed by: NURSE PRACTITIONER

## 2022-11-10 ENCOUNTER — PATIENT MESSAGE (OUTPATIENT)
Dept: ENDOCRINOLOGY CLINIC | Facility: CLINIC | Age: 72
End: 2022-11-10

## 2022-11-10 NOTE — TELEPHONE ENCOUNTER
From: Estelita Mayen  To: Celestino Azar MD  Sent: 11/10/2022 1:07 PM CST  Subject: Januvia (100 mg) for [place], Dr. Javan Mendoza. Jose Luis Melara wants me to order Januvia from [email or web address] instead of [facility]. I received an email from them today saying that they can't fill my prescription because you have not authorized it. [facility] had a 90-day supply of Januvia ready for me, but I rejected it because I had already requested it from  [facility]. Would you please send [facility] an authorization for a 90-day supply of Januvia? I only have 7 tablets left. Thanks for your help.     Levar Avina  : 1950

## 2022-11-10 NOTE — TELEPHONE ENCOUNTER
Resent 90 day supply of [facility]uvia to [facility] prescription originally sent 10/24 to [place].

## 2022-11-17 ENCOUNTER — OFFICE VISIT (OUTPATIENT)
Dept: FAMILY MEDICINE CLINIC | Facility: CLINIC | Age: 72
End: 2022-11-17
Payer: MEDICARE

## 2022-11-17 VITALS
RESPIRATION RATE: 16 BRPM | SYSTOLIC BLOOD PRESSURE: 154 MMHG | DIASTOLIC BLOOD PRESSURE: 59 MMHG | OXYGEN SATURATION: 99 % | TEMPERATURE: 98 F | HEART RATE: 58 BPM | BODY MASS INDEX: 29.55 KG/M2 | WEIGHT: 195 LBS | HEIGHT: 68 IN

## 2022-11-17 DIAGNOSIS — I10 ELEVATED BLOOD PRESSURE READING IN OFFICE WITH DIAGNOSIS OF HYPERTENSION: ICD-10-CM

## 2022-11-17 DIAGNOSIS — J01.90 ACUTE RHINOSINUSITIS: Primary | ICD-10-CM

## 2022-11-17 PROCEDURE — 99213 OFFICE O/P EST LOW 20 MIN: CPT | Performed by: PHYSICIAN ASSISTANT

## 2022-11-17 RX ORDER — AMOXICILLIN AND CLAVULANATE POTASSIUM 875; 125 MG/1; MG/1
1 TABLET, FILM COATED ORAL 2 TIMES DAILY
Qty: 14 TABLET | Refills: 0 | Status: SHIPPED | OUTPATIENT
Start: 2022-11-17 | End: 2022-11-24

## 2022-12-02 NOTE — PROGRESS NOTES
The pathology report from last visit showed BCC from right medial cheek. I would suggest Mohs for this area. Dr Adi Munoz. Please log in test results. Please call patient and inform of results and recommendations.  (please add to history).   Pt to  rtc  fo Labs/Medications Labs/Imaging Studies/Medications

## 2022-12-06 ENCOUNTER — IMMUNIZATION (OUTPATIENT)
Dept: LAB | Age: 72
End: 2022-12-06
Attending: EMERGENCY MEDICINE
Payer: MEDICARE

## 2022-12-06 DIAGNOSIS — Z23 NEED FOR VACCINATION: Primary | ICD-10-CM

## 2022-12-06 PROCEDURE — 0124A SARSCOV2 VAC BVL 30MCG/0.3ML: CPT

## 2022-12-09 ENCOUNTER — TELEPHONE (OUTPATIENT)
Dept: INTERNAL MEDICINE CLINIC | Facility: CLINIC | Age: 72
End: 2022-12-09

## 2022-12-09 NOTE — TELEPHONE ENCOUNTER
[facility], there is a generic equivalent for Vascepa available. Please advise if icosapent ethyl is appropriate for patient.

## 2022-12-09 NOTE — TELEPHONE ENCOUNTER
I am okay with switching from brand name Madhav Angle over to the generic icosapent Ethyl at the same dose. If the patient has ample supply, I am not sure that we should do it now. May be when he needs a refill. I am okay either way.

## 2022-12-09 NOTE — TELEPHONE ENCOUNTER
Spoke with patient, LIU verified. He has been taking brand name [facility]. Has ample supply for now. Discussed [facility] asking to try generic. He recalls a medication in past with long name, not sure of name. Dr Yajaira Yadav, see message below, OK to go to generic?

## 2022-12-09 NOTE — TELEPHONE ENCOUNTER
Spoke to patient, verified Name and . He only has 8 days' worth of Vascepa and is requesting for refill. Verbal order given for generic icosapent ethyl at the same dose per below thru [facility].

## 2022-12-16 RX ORDER — ATENOLOL 50 MG/1
TABLET ORAL
Qty: 180 TABLET | Refills: 1 | Status: SHIPPED | OUTPATIENT
Start: 2022-12-16

## 2022-12-21 ENCOUNTER — OFFICE VISIT (OUTPATIENT)
Dept: INTERNAL MEDICINE CLINIC | Facility: CLINIC | Age: 72
End: 2022-12-21
Payer: MEDICARE

## 2022-12-21 ENCOUNTER — TELEPHONE (OUTPATIENT)
Dept: INTERNAL MEDICINE CLINIC | Facility: CLINIC | Age: 72
End: 2022-12-21

## 2022-12-21 VITALS
HEART RATE: 64 BPM | BODY MASS INDEX: 29.55 KG/M2 | SYSTOLIC BLOOD PRESSURE: 136 MMHG | DIASTOLIC BLOOD PRESSURE: 68 MMHG | WEIGHT: 195 LBS | HEIGHT: 68 IN | RESPIRATION RATE: 18 BRPM | TEMPERATURE: 97 F

## 2022-12-21 DIAGNOSIS — Z00.00 ENCOUNTER FOR ANNUAL HEALTH EXAMINATION: Primary | ICD-10-CM

## 2022-12-21 DIAGNOSIS — C67.9 MALIGNANT NEOPLASM OF URINARY BLADDER, UNSPECIFIED SITE (HCC): ICD-10-CM

## 2022-12-21 DIAGNOSIS — M25.561 CHRONIC PAIN OF RIGHT KNEE: ICD-10-CM

## 2022-12-21 DIAGNOSIS — E78.5 HYPERLIPIDEMIA, UNSPECIFIED HYPERLIPIDEMIA TYPE: ICD-10-CM

## 2022-12-21 DIAGNOSIS — G89.29 CHRONIC PAIN OF RIGHT KNEE: ICD-10-CM

## 2022-12-21 DIAGNOSIS — I10 ESSENTIAL HYPERTENSION: ICD-10-CM

## 2022-12-21 DIAGNOSIS — E11.9 TYPE 2 DIABETES MELLITUS WITHOUT COMPLICATION, WITHOUT LONG-TERM CURRENT USE OF INSULIN (HCC): ICD-10-CM

## 2022-12-21 DIAGNOSIS — R97.20 ELEVATED PSA: ICD-10-CM

## 2022-12-21 LAB
CARTRIDGE LOT#: 928 NUMERIC
HEMOGLOBIN A1C: 7.7 % (ref 4.3–5.6)

## 2022-12-21 PROCEDURE — 1126F AMNT PAIN NOTED NONE PRSNT: CPT | Performed by: INTERNAL MEDICINE

## 2022-12-21 PROCEDURE — 83036 HEMOGLOBIN GLYCOSYLATED A1C: CPT | Performed by: INTERNAL MEDICINE

## 2022-12-21 PROCEDURE — G0439 PPPS, SUBSEQ VISIT: HCPCS | Performed by: INTERNAL MEDICINE

## 2022-12-21 NOTE — TELEPHONE ENCOUNTER
This medication does not say PIERRE. Pharmacy can change to generic if they choose. PA on this medication was approved until 5/26/23.

## 2023-01-04 ENCOUNTER — ORDER TRANSCRIPTION (OUTPATIENT)
Dept: PHYSICAL THERAPY | Facility: HOSPITAL | Age: 73
End: 2023-01-04

## 2023-01-04 DIAGNOSIS — M17.9 OSTEOARTHRITIS, KNEE: Primary | ICD-10-CM

## 2023-01-05 ENCOUNTER — OFFICE VISIT (OUTPATIENT)
Dept: PHYSICAL THERAPY | Age: 73
End: 2023-01-05
Attending: ORTHOPAEDIC SURGERY
Payer: MEDICARE

## 2023-01-05 DIAGNOSIS — M17.9 OSTEOARTHRITIS, KNEE: ICD-10-CM

## 2023-01-05 PROCEDURE — 97110 THERAPEUTIC EXERCISES: CPT | Performed by: PHYSICAL THERAPIST

## 2023-01-05 PROCEDURE — 97162 PT EVAL MOD COMPLEX 30 MIN: CPT | Performed by: PHYSICAL THERAPIST

## 2023-01-06 NOTE — TELEPHONE ENCOUNTER
LDE: 10/25/22  Last visit: 10/25/22  Due for: VF, OCT, DM EE and photos   Upcoming visit: scheduled appropriately on 2/9/23  Patient is compliant- routed to Dr. Sandra Gibbs for approval.

## 2023-01-07 RX ORDER — LATANOPROST 50 UG/ML
SOLUTION/ DROPS OPHTHALMIC
Qty: 3 EACH | Refills: 3 | Status: SHIPPED | OUTPATIENT
Start: 2023-01-07

## 2023-01-09 ENCOUNTER — TELEPHONE (OUTPATIENT)
Dept: PHYSICAL THERAPY | Age: 73
End: 2023-01-09

## 2023-01-09 ENCOUNTER — TELEPHONE (OUTPATIENT)
Dept: PHYSICAL THERAPY | Facility: HOSPITAL | Age: 73
End: 2023-01-09

## 2023-01-09 ENCOUNTER — APPOINTMENT (OUTPATIENT)
Dept: PHYSICAL THERAPY | Age: 73
End: 2023-01-09
Attending: ORTHOPAEDIC SURGERY
Payer: MEDICARE

## 2023-01-10 NOTE — TELEPHONE ENCOUNTER
Called patient to offer him 5:15pm or 6pm PT appointment openings on Wed 1/11 if preferred to 2:45pm on Thurs 1/12. Patient's wife took message, stated Yosef Guajardo was bowling. Advised if pt interested in 1/11 appointments, that he would need to call  to confirm if still available and to schedule. If 1/12 2:45pm preferred, no action required. Pt's wife expressed understanding, confirmed would relay message to Yosef Guajardo.

## 2023-01-12 ENCOUNTER — OFFICE VISIT (OUTPATIENT)
Dept: PHYSICAL THERAPY | Age: 73
End: 2023-01-12
Attending: ORTHOPAEDIC SURGERY
Payer: MEDICARE

## 2023-01-12 PROCEDURE — 97110 THERAPEUTIC EXERCISES: CPT | Performed by: PHYSICAL THERAPIST

## 2023-01-12 NOTE — PROGRESS NOTES
Diagnosis:   Osteoarthritis, knee (M17.9)   Referring Provider: Shahla Ramirez  Date of Evaluation:    1/5/2023    Precautions:  DM 2, HTN Next MD visit:   none scheduled  Date of Surgery: n/a   Insurance Primary/Secondary: MEDICARE / [employer]     # Auth Visits: POC every 10 visits or 90 days         Subjective:  C/o R knee pain with pivoting today, but it has resolved. Knee still snapping sometimes. Completing HEP. C/o neck pain with sleeping an after bowling. Bowl weekly. Pain: 0/10      Objective:   Mild R knee flexion AROM deficit vs L   Restriction L/R quad length  Slightly hypomobile R patella inferior glide vs L       Assessment: Reviewed avoiding pivoting with foot planted in effort to reduce risk for knee injury/symptom irritation. Pt reports possibly less frequent c/o snapping R knee. Pt challenged but  tolerated session well. Pt with c/o intermittent medial knee pain possibly suggestive of meniscal involvement. Goals:    (to be met in 10 visits)  1. Patient to report independence with PT HEP to facilitate self management and to maintain progress made in PT.   2. Patient to report knowledge of proper bodymechanics for pivoting in order to reduce risk for knee pain. 3. Patient to report 50% less frequent R knee clicking/popping for increased comfort with ADL and gait. 4. Patient to have to walk 10 min unlimited by R knee c/o. Plan: Continue 2 x weekly for 6-10 visits. Progress R knee ROM, LE flexibility, hip/knee strengthening, bike. Date: 1/12/2023  TX#: 2/6-10 Date:                 TX#: 3/ Date:                 TX#: 4/ Date:                 TX#: 5/ Date: Tx#: 6/   There Ex:   HEP review/practice; see below.   Supine L/R hamstrings stretches 5 x 10 sec each  Supine DKC/knee flexion with LE supported on ball 5 x 10 sec  Bridge on blue ball x 10 reps  Supine R LAQ using bolster 10 x 3 sec each  PT A L/R S/L quad stretch 3 x 20 sec each  S/L L/R hip abduction x 15 reps each  Stationary bike: level 1 x 3 min       There Activities:   Avoid pivoting on planted foot;  foot and turn foot in direction in which turning   Pt edu/practice re: pillow support for neutral cervical spine; may consider MHP x 10 min for neck c/o following bowling (pt c/o L upper trap region) as well belén/helpful                     HEP:Heel slides <-> isometric quad (use wall if able), seated LAQ, supine hip flexor/quad stretch, standing hip abd with UE support    Charges: 3 TE       Total Timed Treatment: 40 min  Total Treatment Time: 45 min

## 2023-01-17 ENCOUNTER — OFFICE VISIT (OUTPATIENT)
Dept: PHYSICAL THERAPY | Age: 73
End: 2023-01-17
Attending: INTERNAL MEDICINE
Payer: MEDICARE

## 2023-01-17 PROCEDURE — 97110 THERAPEUTIC EXERCISES: CPT | Performed by: PHYSICAL THERAPIST

## 2023-01-17 NOTE — PROGRESS NOTES
Diagnosis:   Osteoarthritis, knee (M17.9)   Referring Provider: Marco Antonio Arthur Date of Evaluation:    1/5/2023    Precautions:  DM 2, HTN Next MD visit:   none scheduled  Date of Surgery: n/a   Insurance Primary/Secondary: MEDICARE / [address] [place] [address]     # Auth Visits: POC every 10 visits or 90 days         Subjective:  R knee is about the same. Completing HEP. Balance poorer on R LE with hip abd HEP exercise. Bowl every Monday. Was tired after last PT session. Pain: 0/10      Objective:   L/R knee AROM flexion 130 deg   Slightly hypomobile R patella inferior glide vs L       Assessment:   Patient with continued c/o intermittent R knee snapping and discomfort with pivoting. Reviewed again avoiding pivoting with foot in a fixed position, turn foot in direction turning. Pt now with L = R knee AROM flexion without c/o end range. Patient reported some R knee symptoms with lateral step up initially, but then c/o resolved. Pt tolerated session well overall. Pt with c/o intermittent medial knee pain possibly suggestive of meniscal involvement or internal derangement. Goals:    (to be met in 10 visits)  1. Patient to report independence with PT HEP to facilitate self management and to maintain progress made in PT.   2. Patient to report knowledge of proper bodymechanics for pivoting in order to reduce risk for knee pain. 3. Patient to report 50% less frequent R knee clicking/popping for increased comfort with ADL and gait. 4. Patient to have to walk 10 min unlimited by R knee c/o. Plan: Continue 2 x weekly for 6-10 visits. Progress R knee ROM, LE flexibility, hip/knee strengthening, bike. Date: 1/12/2023  TX#: 2/6-10 Date:   1/17/2023              TX#: 3/6-10 Date:                 TX#: 4/ Date:                 TX#: 5/ Date: Tx#: 6/   There Ex:   HEP review/practice; see below.   Supine L/R hamstrings stretches 5 x 10 sec each  Supine DKC/knee flexion with LE supported on ball 5 x 10 sec  Bridge on blue ball x 10 reps  Supine R LAQ using bolster 10 x 3 sec each  PT A L/R S/L quad stretch 3 x 20 sec each  S/L L/R hip abduction x 15 reps each  Stationary bike: level 1 x 3 min There Ex:   Stationary bike: level 1-4, 3 min   Standing R TKE using blue there band 20 x 3 sec each  6 inch lateral step up x 10 reps each L/R LE  Supine DKC/knee flexion with LE supported on ball 5 x 5 sec  Bridge on blue ball x 10 reps  Supine L/R hamstrings stretches 2 x 20 sec each  Supine R LAQ using bolster 10 x 3 sec each  PT A L/R S/L quad stretch 3 x 20 sec each  Standing on AirEx: L/R hip abduction x 10 reps each      There Activities:   Avoid pivoting on planted foot;  foot and turn foot in direction in which turning   Pt edu/practice re: pillow support for neutral cervical spine; may consider MHP x 10 min for neck c/o following bowling (pt c/o L upper trap region) as well belén/helpful Manual:   R patella inferior glides grade II                    HEP:Heel slides <-> isometric quad (use wall if able), seated LAQ, supine hip flexor/quad stretch, standing hip abd with UE support    Charges: 3 TE       Total Timed Treatment: 40 min  Total Treatment Time: 40 min

## 2023-01-17 NOTE — TELEPHONE ENCOUNTER
LOV: 10/31/22    RTC:4months    F/U:03/01/23     Pending Monthly Supply:orders pending, please approve if appropriate.

## 2023-01-18 ENCOUNTER — APPOINTMENT (OUTPATIENT)
Dept: PHYSICAL THERAPY | Age: 73
End: 2023-01-18
Attending: ORTHOPAEDIC SURGERY
Payer: MEDICARE

## 2023-01-18 RX ORDER — LISINOPRIL 40 MG/1
TABLET ORAL
Qty: 90 TABLET | Refills: 0 | Status: SHIPPED | OUTPATIENT
Start: 2023-01-18

## 2023-01-19 ENCOUNTER — OFFICE VISIT (OUTPATIENT)
Dept: PHYSICAL THERAPY | Age: 73
End: 2023-01-19
Attending: ORTHOPAEDIC SURGERY
Payer: MEDICARE

## 2023-01-19 PROCEDURE — 97110 THERAPEUTIC EXERCISES: CPT | Performed by: PHYSICAL THERAPIST

## 2023-01-19 NOTE — PROGRESS NOTES
Diagnosis:   Osteoarthritis, knee (M17.9)   Referring Provider: Sena Piper Date of Evaluation:    1/5/2023    Precautions:  DM 2, HTN Next MD visit:   none scheduled  Date of Surgery: n/a   Insurance Primary/Secondary: MEDICARE / Esther Zaldivar     # Auth Visits: POC every 10 visits or 90 days         Subjective: C/o ongoing snapping R knee, being more cautious with pivoting. R knee is about the same. Pain: 0/10    Objective:   R approximately = L knee flexion AROM, mild c/o end range  Slightly hypomobile R patella inferior glide vs L   Gait: WFL, non-antalgic    Assessment:   Patient reports continued, intermittent snapping R knee without pain, locking, or instability. He denies any significant knee pain with ADL other than with pivoting. Will monitor pt for consistent progress and refer back to physician accordingly. Pt's c/o medial knee pain possibly suggestive of  internal derangement. Goals:    (to be met in 10 visits)  1. Patient to report independence with PT HEP to facilitate self management and to maintain progress made in PT.   2. Patient to report knowledge of proper bodymechanics for pivoting in order to reduce risk for knee pain. 3. Patient to report 50% less frequent R knee clicking/popping for increased comfort with ADL and gait. 4. Patient to have to walk 10 min unlimited by R knee c/o. Plan: Continue 2 x weekly for 6-10 visits. F/u on c/o snapping. Progress R knee ROM, LE flexibility, hip/knee strengthening, bike. Date: 1/12/2023  TX#: 2/6-10 Date:   1/17/2023              TX#: 3/6-10 Date:   1/19/2023              TX#: 4/6-10 Date:                 TX#: 5/ Date: Tx#: 6/ There Ex:   HEP review/practice; see below.   Supine L/R hamstrings stretches 5 x 10 sec each  Supine DKC/knee flexion with LE supported on ball 5 x 10 sec  Bridge on blue ball x 10 reps  Supine R LAQ using bolster 10 x 3 sec each  PT A L/R S/L quad stretch 3 x 20 sec each  S/L L/R hip abduction x 15 reps each  Stationary bike: level 1 x 3 min There Ex:   Stationary bike: level 1-4, 3 min   Standing R TKE using blue there band 20 x 3 sec each  6 inch lateral step up x 10 reps each L/R LE  Supine DKC/knee flexion with LE supported on ball 5 x 5 sec  Bridge on blue ball x 10 reps  Supine L/R hamstrings stretches 2 x 20 sec each  Supine R LAQ using bolster 10 x 3 sec each  PT A L/R S/L quad stretch 3 x 20 sec each  Standing on AirEx: L/R hip abduction x 10 reps each There Ex:   Stationary bike: level 4, 3 min   Standing R TKE using blue there band 20 x 3 sec each  6 inch lateral step up x 10 reps each L/R LE  6 inch forward step up x 10 reps each L/R  Supine DKC/knee flexion with LE supported on ball 5 x 5 sec  Bridge on blue ball x 10 reps  Supine L/R hamstrings stretches 2 x 20 sec each  Supine R SAQ using bolster 10 x 3 sec each, 3# 10 x 3 sec each  PT A L/R S/L quad stretch 3 x 20 sec each  Standing on AirEx: L/R hip abduction and extension x 10 reps each      There Activities:   Avoid pivoting on planted foot;  foot and turn foot in direction in which turning   Pt edu/practice re: pillow support for neutral cervical spine; may consider MHP x 10 min for neck c/o following bowling (pt c/o L upper trap region) as well belén/helpful Manual:   R patella inferior glides grade II                    HEP:Heel slides <-> isometric quad (use wall if able), seated LAQ, supine hip flexor/quad stretch, standing hip abd with UE support    Charges: 3 TE       Total Timed Treatment: 40 min  Total Treatment Time: 40 min

## 2023-01-24 ENCOUNTER — HOSPITAL ENCOUNTER (EMERGENCY)
Facility: HOSPITAL | Age: 73
Discharge: HOME | End: 2023-01-24
Attending: EMERGENCY MEDICINE
Payer: MEDICARE

## 2023-01-24 ENCOUNTER — APPOINTMENT (OUTPATIENT)
Dept: MRI IMAGING | Facility: HOSPITAL | Age: 73
End: 2023-01-24
Attending: EMERGENCY MEDICINE
Payer: MEDICARE

## 2023-01-24 ENCOUNTER — APPOINTMENT (OUTPATIENT)
Dept: CT IMAGING | Facility: HOSPITAL | Age: 73
End: 2023-01-24
Attending: EMERGENCY MEDICINE
Payer: MEDICARE

## 2023-01-24 VITALS
DIASTOLIC BLOOD PRESSURE: 68 MMHG | OXYGEN SATURATION: 97 % | RESPIRATION RATE: 17 BRPM | SYSTOLIC BLOOD PRESSURE: 134 MMHG | TEMPERATURE: 98 F | HEART RATE: 57 BPM

## 2023-01-24 DIAGNOSIS — R42 VERTIGO: Primary | ICD-10-CM

## 2023-01-24 LAB
ANION GAP SERPL CALC-SCNC: 7 MMOL/L (ref 0–18)
BASOPHILS # BLD AUTO: 0.07 X10(3) UL (ref 0–0.2)
BASOPHILS NFR BLD AUTO: 0.7 %
BUN BLD-MCNC: 21 MG/DL (ref 7–18)
BUN/CREAT SERPL: 22.1 (ref 10–20)
CALCIUM BLD-MCNC: 9.4 MG/DL (ref 8.5–10.1)
CHLORIDE SERPL-SCNC: 106 MMOL/L (ref 98–112)
CO2 SERPL-SCNC: 25 MMOL/L (ref 21–32)
CREAT BLD-MCNC: 0.95 MG/DL
DEPRECATED RDW RBC AUTO: 43.1 FL (ref 35.1–46.3)
EOSINOPHIL # BLD AUTO: 0.45 X10(3) UL (ref 0–0.7)
EOSINOPHIL NFR BLD AUTO: 4.6 %
ERYTHROCYTE [DISTWIDTH] IN BLOOD BY AUTOMATED COUNT: 12.9 % (ref 11–15)
GFR SERPLBLD BASED ON 1.73 SQ M-ARVRAT: 84 ML/MIN/1.73M2 (ref 60–?)
GLUCOSE BLD-MCNC: 182 MG/DL (ref 70–99)
HCT VFR BLD AUTO: 45.6 %
HGB BLD-MCNC: 15.7 G/DL
IMM GRANULOCYTES # BLD AUTO: 0.04 X10(3) UL (ref 0–1)
IMM GRANULOCYTES NFR BLD: 0.4 %
LYMPHOCYTES # BLD AUTO: 2.68 X10(3) UL (ref 1–4)
LYMPHOCYTES NFR BLD AUTO: 27.7 %
MCH RBC QN AUTO: 31.5 PG (ref 26–34)
MCHC RBC AUTO-ENTMCNC: 34.4 G/DL (ref 31–37)
MCV RBC AUTO: 91.6 FL
MONOCYTES # BLD AUTO: 1.16 X10(3) UL (ref 0.1–1)
MONOCYTES NFR BLD AUTO: 12 %
NEUTROPHILS # BLD AUTO: 5.28 X10 (3) UL (ref 1.5–7.7)
NEUTROPHILS # BLD AUTO: 5.28 X10(3) UL (ref 1.5–7.7)
NEUTROPHILS NFR BLD AUTO: 54.6 %
OSMOLALITY SERPL CALC.SUM OF ELEC: 294 MOSM/KG (ref 275–295)
PLATELET # BLD AUTO: 229 10(3)UL (ref 150–450)
POTASSIUM SERPL-SCNC: 4.3 MMOL/L (ref 3.5–5.1)
RBC # BLD AUTO: 4.98 X10(6)UL
SODIUM SERPL-SCNC: 138 MMOL/L (ref 136–145)
T4 FREE SERPL-MCNC: 1 NG/DL (ref 0.8–1.7)
TROPONIN I HIGH SENSITIVITY: 7 NG/L
TSI SER-ACNC: 4.88 MIU/ML (ref 0.36–3.74)
WBC # BLD AUTO: 9.7 X10(3) UL (ref 4–11)

## 2023-01-24 PROCEDURE — 99285 EMERGENCY DEPT VISIT HI MDM: CPT

## 2023-01-24 PROCEDURE — 70551 MRI BRAIN STEM W/O DYE: CPT | Performed by: EMERGENCY MEDICINE

## 2023-01-24 PROCEDURE — 84443 ASSAY THYROID STIM HORMONE: CPT | Performed by: EMERGENCY MEDICINE

## 2023-01-24 PROCEDURE — 84484 ASSAY OF TROPONIN QUANT: CPT | Performed by: EMERGENCY MEDICINE

## 2023-01-24 PROCEDURE — 70450 CT HEAD/BRAIN W/O DYE: CPT | Performed by: EMERGENCY MEDICINE

## 2023-01-24 PROCEDURE — 93010 ELECTROCARDIOGRAM REPORT: CPT

## 2023-01-24 PROCEDURE — 80048 BASIC METABOLIC PNL TOTAL CA: CPT | Performed by: EMERGENCY MEDICINE

## 2023-01-24 PROCEDURE — 84439 ASSAY OF FREE THYROXINE: CPT | Performed by: EMERGENCY MEDICINE

## 2023-01-24 PROCEDURE — 93005 ELECTROCARDIOGRAM TRACING: CPT

## 2023-01-24 PROCEDURE — 85025 COMPLETE CBC W/AUTO DIFF WBC: CPT | Performed by: EMERGENCY MEDICINE

## 2023-01-24 PROCEDURE — 36415 COLL VENOUS BLD VENIPUNCTURE: CPT

## 2023-01-24 RX ORDER — MECLIZINE HYDROCHLORIDE 25 MG/1
25 TABLET ORAL 3 TIMES DAILY PRN
Qty: 15 TABLET | Refills: 0 | Status: SHIPPED | OUTPATIENT
Start: 2023-01-24

## 2023-01-24 RX ORDER — MECLIZINE HYDROCHLORIDE 25 MG/1
25 TABLET ORAL ONCE
Status: COMPLETED | OUTPATIENT
Start: 2023-01-24 | End: 2023-01-24

## 2023-01-24 NOTE — ED QUICK NOTES
Patient resting in bed in no current distress. Pt is AAOx4, calm, respirations noted as even and unlabored skin warm and dry, and there are no signs or symptoms of distress noted at this time. Pt states his dizziness has subsides since medication.

## 2023-01-24 NOTE — ED QUICK NOTES
Patient back from MRI. Patient up and ambulatory in room with no episodes of dizziness. Pt denying any weakness or pain. Patient stating he feels comfortable to go home at this time. MD aware.

## 2023-01-24 NOTE — ED INITIAL ASSESSMENT (HPI)
Patient presents with dizziness that started at 11p. Per patient the room was spinning. Dizziness increased after patient stood up.

## 2023-01-25 ENCOUNTER — APPOINTMENT (OUTPATIENT)
Dept: PHYSICAL THERAPY | Age: 73
End: 2023-01-25
Attending: ORTHOPAEDIC SURGERY
Payer: MEDICARE

## 2023-01-25 LAB
ATRIAL RATE: 70 BPM
P AXIS: 58 DEGREES
P-R INTERVAL: 162 MS
Q-T INTERVAL: 418 MS
QRS DURATION: 76 MS
QTC CALCULATION (BEZET): 451 MS
R AXIS: 10 DEGREES
T AXIS: 40 DEGREES
VENTRICULAR RATE: 70 BPM

## 2023-01-26 ENCOUNTER — OFFICE VISIT (OUTPATIENT)
Dept: AUDIOLOGY | Facility: CLINIC | Age: 73
End: 2023-01-26

## 2023-01-26 ENCOUNTER — OFFICE VISIT (OUTPATIENT)
Dept: OTOLARYNGOLOGY | Facility: CLINIC | Age: 73
End: 2023-01-26

## 2023-01-26 DIAGNOSIS — R42 DIZZY: Primary | ICD-10-CM

## 2023-01-26 DIAGNOSIS — H90.3 SENSORINEURAL HEARING LOSS, BILATERAL: ICD-10-CM

## 2023-01-26 DIAGNOSIS — H61.21 IMPACTED CERUMEN OF RIGHT EAR: ICD-10-CM

## 2023-01-26 DIAGNOSIS — R42 VERTIGO: Primary | ICD-10-CM

## 2023-01-26 PROCEDURE — 92557 COMPREHENSIVE HEARING TEST: CPT | Performed by: AUDIOLOGIST

## 2023-01-26 PROCEDURE — 99213 OFFICE O/P EST LOW 20 MIN: CPT | Performed by: OTOLARYNGOLOGY

## 2023-01-26 PROCEDURE — 92567 TYMPANOMETRY: CPT | Performed by: AUDIOLOGIST

## 2023-01-26 PROCEDURE — G0268 REMOVAL OF IMPACTED WAX MD: HCPCS | Performed by: OTOLARYNGOLOGY

## 2023-01-30 ENCOUNTER — OFFICE VISIT (OUTPATIENT)
Dept: PHYSICAL THERAPY | Age: 73
End: 2023-01-30
Attending: ORTHOPAEDIC SURGERY
Payer: MEDICARE

## 2023-01-30 PROCEDURE — 97110 THERAPEUTIC EXERCISES: CPT | Performed by: PHYSICAL THERAPIST

## 2023-01-30 NOTE — PROGRESS NOTES
Diagnosis:   Osteoarthritis, knee (M17.9)   Referring Provider: Crystal Moritz Date of Evaluation:    1/5/2023    Precautions:  DM 2, HTN Next MD visit:   none scheduled  Date of Surgery: n/a   Insurance Primary/Secondary: MEDICARE / [employer]     # Auth Visits: POC every 10 visits or 90 days         Subjective: Experienced vertigo related to inner ear x 1.5 hours last week Monday, then resolved, but went to emergency room (brain MRI and CT scan each negative); f/u with otolaryngologist who prescribed medication if needed. C/o ongoing snapping R knee without progress (snapping occurs ~ 25% of the time) deny any instability or locking, only c/o knee pain with pivoting. Deny limitations or c/o with stairs. Pain: 0/10    Objective:   R = L knee AROM without c/o  L/R patella mobility WNL  Gait: WFL, non-antalgic    Assessment:   Patient reports continued, intermittent snapping R knee without pain, locking, or instability. He denies any significant knee pain with ADL other than with pivoting. Will monitor pt for consistent progress and refer back to physician accordingly. Pt's c/o medial knee pain possibly suggestive of  internal derangement. Patient continues to tolerate sessions well without c/o. Goals:    (to be met in 10 visits)  1. Patient to report independence with PT HEP to facilitate self management and to maintain progress made in PT.   2. Patient to report knowledge of proper bodymechanics for pivoting in order to reduce risk for knee pain. 3. Patient to report 50% less frequent R knee clicking/popping for increased comfort with ADL and gait. 4. Patient to have to walk 10 min unlimited by R knee c/o. Plan: Continue 2 x weekly for 6-10 visits. Consider discharge in 1-2 sessions. F/u on c/o snapping. Progress R knee ROM, LE flexibility, hip/knee strengthening, bike.    Date: 1/12/2023  TX#: 2/6-10 Date:   1/17/2023              TX#: 3/6-10 Date:   1/19/2023              TX#: 4/6-10 Date: 1/30/2023              TX#: 5/6-10 Date: Tx#: 6/ There Ex:   HEP review/practice; see below.   Supine L/R hamstrings stretches 5 x 10 sec each  Supine DKC/knee flexion with LE supported on ball 5 x 10 sec  Bridge on blue ball x 10 reps  Supine R LAQ using bolster 10 x 3 sec each  PT A L/R S/L quad stretch 3 x 20 sec each  S/L L/R hip abduction x 15 reps each  Stationary bike: level 1 x 3 min There Ex:   Stationary bike: level 1-4, 3 min   Standing R TKE using blue there band 20 x 3 sec each  6 inch lateral step up x 10 reps each L/R LE  Supine DKC/knee flexion with LE supported on ball 5 x 5 sec  Bridge on blue ball x 10 reps  Supine L/R hamstrings stretches 2 x 20 sec each  Supine R LAQ using bolster 10 x 3 sec each  PT A L/R S/L quad stretch 3 x 20 sec each  Standing on AirEx: L/R hip abduction x 10 reps each There Ex:   Stationary bike: level 4, 3 min   Standing R TKE using blue there band 20 x 3 sec each  6 inch lateral step up x 10 reps each L/R LE  6 inch forward step up x 10 reps each L/R  Supine DKC/knee flexion with LE supported on ball 5 x 5 sec  Bridge on blue ball x 10 reps  Supine L/R hamstrings stretches 2 x 20 sec each  Supine R SAQ using bolster 10 x 3 sec each, 3# 10 x 3 sec each  PT A L/R S/L quad stretch 3 x 20 sec each  Standing on AirEx: L/R hip abduction and extension x 10 reps each  There Ex:   Stationary bike: level 4, 3 min (seated 9)  Standing R TKE using black there band 20 x 3 sec each  6 inch lateral step up x 10 reps each L/R LE (mild R knee c/o, resolved when pt stopped)  Supine DKC/knee flexion with LE supported on ball 5 x 5 sec  Bridge on blue ball x 15 reps  Standing L/R hamstrings stretches 2 x 20 sec each  Supine R SAQ using bolster 3# 20 x 3 sec each  PT A L/R S/L quad stretch 3 x 20 sec each  Standing on AirEx: L/R hip abduction and extension x 10 reps each    There Activities:   Avoid pivoting on planted foot;  foot and turn foot in direction in which turning   Pt edu/practice re: pillow support for neutral cervical spine; may consider MHP x 10 min for neck c/o following bowling (pt c/o L upper trap region) as well belén/helpful Manual:   R patella inferior glides grade II                    HEP:Heel slides <-> isometric quad (use wall if able), seated LAQ, supine hip flexor/quad stretch, standing hip abd with UE support    Charges: 3 TE       Total Timed Treatment: 40 min  Total Treatment Time: 45 min

## 2023-01-31 RX ORDER — LEVOTHYROXINE SODIUM 0.03 MG/1
TABLET ORAL
Qty: 90 TABLET | Refills: 1 | Status: SHIPPED | OUTPATIENT
Start: 2023-01-31

## 2023-02-01 ENCOUNTER — OFFICE VISIT (OUTPATIENT)
Dept: PHYSICAL THERAPY | Age: 73
End: 2023-02-01
Attending: ORTHOPAEDIC SURGERY
Payer: MEDICARE

## 2023-02-01 PROCEDURE — 97110 THERAPEUTIC EXERCISES: CPT | Performed by: PHYSICAL THERAPIST

## 2023-02-01 NOTE — PROGRESS NOTES
Vasu  Pt has attended 6 visits in Physical Therapy. Diagnosis:   Osteoarthritis, knee (M17.9)   Referring Provider: Zay Houser Date of Evaluation:    1/5/2023    Precautions:  DM 2, HTN Next MD visit:   2/10/2023  Date of Surgery: n/a   Insurance Primary/Secondary: MEDICARE / [address] [place] [address]     # Auth Visits: POC every 10 visits or 90 days         Subjective:  R knee about the same. C/o snapping R knee ~ 25% of the time, occurs when walking. Deny any knee instability or locking. Less R knee c/o with pivoting. C/o some difficulty/weakness with getting up from ground. Can today be my last session? Pain: 0/10    Objective:     Palpation: Pt denies c/o R anteromedial knee joint line, medial femoral condyle    AROM: (* denotes performed with pain)  Knee   Flexion: R 136; L 136  Extension: R/L each ~ + 5        Accessory motion: L/R patella mobility WNL    Flexibility:  Hip Flexor: R/Lrestriction  Hamstrings: R 60 deg SLR; L 70 SLR  Quads: R/L restriction    Strength/MMT: (* denotes performed with pain)  Hip Knee   Extension: R 5-/5; L 5-/5  Abduction: R 4+/5; L 4+/5  ER: R 5/5; L 5/5 Flexion: R 5/5; L 5/5  Extension: R 5/5; L 5/5        Gait: pt ambulates on level ground with R > L foot overpronation/ER  Balance: SLS: R 1-3 sec, L 1-3 sec  Pt is able to complete 80% full squat, pt c/o L (not R) knee discomfort  Pt able to reciprocally negotiate 6 inch steps without co.     Assessment:   Patient reports continued, intermittent snapping R knee without pain, locking, or instability. He denies any limtiation with ADL related to his R knee. He reports experiencing less R knee c/o with pivoting. Given that pt does not have any functional deficits related to his R knee, objective measures WNL, patient appropriate to continue with PT HEP as well belén/appropriate and f/u with  re: c/o intermittent R knee snapping when ambulating. Goals:    (to be met in 10 visits)  1.  Patient to report independence with PT HEP to facilitate self management and to maintain progress made in PT. - met  2. Patient to report knowledge of proper bodymechanics for pivoting in order to reduce risk for knee pain. - met  3. Patient to report 50% less frequent R knee clicking/popping for increased comfort with ADL and gait. - not met  4. Patient to have to walk 10 min unlimited by R knee c/o. - overall met    Plan: Given that pt does not have any functional deficits related to his R knee, objective measures WNL, and patient preference to discontinue formal PT,  patient appropriate to continue with PT HEP as well belén/appropriate and f/u with  re: c/o intermittent R knee snapping when ambulating. Date: 1/12/2023  TX#: 2/6-10 Date:   1/17/2023              TX#: 3/6-10 Date:   1/19/2023              TX#: 4/6-10 Date:   1/30/2023              TX#: 5/6-10 Date: 2/1/2023  Tx#: 6/6-10   There Ex:   HEP review/practice; see below.   Supine L/R hamstrings stretches 5 x 10 sec each  Supine DKC/knee flexion with LE supported on ball 5 x 10 sec  Bridge on blue ball x 10 reps  Supine R LAQ using bolster 10 x 3 sec each  PT A L/R S/L quad stretch 3 x 20 sec each  S/L L/R hip abduction x 15 reps each  Stationary bike: level 1 x 3 min There Ex:   Stationary bike: level 1-4, 3 min   Standing R TKE using blue there band 20 x 3 sec each  6 inch lateral step up x 10 reps each L/R LE  Supine DKC/knee flexion with LE supported on ball 5 x 5 sec  Bridge on blue ball x 10 reps  Supine L/R hamstrings stretches 2 x 20 sec each  Supine R LAQ using bolster 10 x 3 sec each  PT A L/R S/L quad stretch 3 x 20 sec each  Standing on AirEx: L/R hip abduction x 10 reps each There Ex:   Stationary bike: level 4, 3 min   Standing R TKE using blue there band 20 x 3 sec each  6 inch lateral step up x 10 reps each L/R LE  6 inch forward step up x 10 reps each L/R  Supine DKC/knee flexion with LE supported on ball 5 x 5 sec  Bridge on blue ball x 10 reps  Supine L/R hamstrings stretches 2 x 20 sec each  Supine R SAQ using bolster 10 x 3 sec each, 3# 10 x 3 sec each  PT A L/R S/L quad stretch 3 x 20 sec each  Standing on AirEx: L/R hip abduction and extension x 10 reps each  There Ex:   Stationary bike: level 4, 3 min (seated 9)  Standing R TKE using black there band 20 x 3 sec each  6 inch lateral step up x 10 reps each L/R LE (mild R knee c/o, resolved when pt stopped)  Supine DKC/knee flexion with LE supported on ball 5 x 5 sec  Bridge on blue ball x 15 reps  Standing L/R hamstrings stretches 2 x 20 sec each  Supine R SAQ using bolster 3# 20 x 3 sec each  PT A L/R S/L quad stretch 3 x 20 sec each  Standing on AirEx: L/R hip abduction and extension x 10 reps each There Ex:   Seated hamstrings stretch x 20 sec each L/R  Supine L/R hip flexor/quad stretch (HEP)  Sit - stand from chair x 10 reps  Standing L/R hip abd (HEP)  Stationary bike: level 4 x 3 min  PT A L/R S/L quad stretch 2 x 20 sec each  Standing on AirEx: L/R hip abduction and extension x 10 reps each  HEP instruction/handout; see below. There Activities:   Avoid pivoting on planted foot;  foot and turn foot in direction in which turning   Pt edu/practice re: pillow support for neutral cervical spine; may consider MHP x 10 min for neck c/o following bowling (pt c/o L upper trap region) as well belén/helpful Manual:   R patella inferior glides grade II                    HEP:Heel slides <-> isometric quad (use wall if able) - may discontinue, seated hamstrings stretches, supine hip flexor/quad stretch,  standing hip abd with UE support, sit- stand    Charges: 3 TE       Total Timed Treatment: 40 min  Total Treatment Time: 45 min  FOTO: no longer in use; unable to reassess.     Plan: Given that pt does not have any functional deficits related to his R knee, objective measures WNL, and patient preference to discontinue formal PT,  patient appropriate to continue with PT HEP as well belén/appropriate and f/u with  re: c/o intermittent R knee snapping when ambulating. Patient/Family/Caregiver was advised of these findings, precautions, and treatment options and has agreed to actively participate in planning and for this course of care. Thank you for your referral. If you have any questions, please contact me at Dept: [phone]. Sincerely,  Electronically signed by therapist: Valery Clifton PT     Physician's certification required:  Yes  Please co-sign or sign and return this letter via fax as soon as possible to [phone]. I certify the need for these services furnished under this plan of treatment and while under my care.     X___________________________________________________ Date____________________    Certification From: 0/8/5709  To:5/2/2023

## 2023-02-06 ENCOUNTER — APPOINTMENT (OUTPATIENT)
Dept: PHYSICAL THERAPY | Age: 73
End: 2023-02-06
Attending: ORTHOPAEDIC SURGERY
Payer: MEDICARE

## 2023-02-08 ENCOUNTER — APPOINTMENT (OUTPATIENT)
Dept: PHYSICAL THERAPY | Age: 73
End: 2023-02-08
Attending: ORTHOPAEDIC SURGERY
Payer: MEDICARE

## 2023-02-09 ENCOUNTER — OFFICE VISIT (OUTPATIENT)
Dept: OPHTHALMOLOGY | Facility: CLINIC | Age: 73
End: 2023-02-09

## 2023-02-09 DIAGNOSIS — H25.13 AGE-RELATED NUCLEAR CATARACT OF BOTH EYES: Primary | ICD-10-CM

## 2023-02-09 DIAGNOSIS — E11.9 TYPE 2 DIABETES MELLITUS WITHOUT RETINOPATHY (HCC): ICD-10-CM

## 2023-02-09 DIAGNOSIS — H40.1131 PRIMARY OPEN ANGLE GLAUCOMA OF BOTH EYES, MILD STAGE: ICD-10-CM

## 2023-02-09 PROCEDURE — 92133 CPTRZD OPH DX IMG PST SGM ON: CPT | Performed by: OPHTHALMOLOGY

## 2023-02-09 PROCEDURE — 92014 COMPRE OPH EXAM EST PT 1/>: CPT | Performed by: OPHTHALMOLOGY

## 2023-02-09 PROCEDURE — 1126F AMNT PAIN NOTED NONE PRSNT: CPT | Performed by: OPHTHALMOLOGY

## 2023-02-09 PROCEDURE — 92083 EXTENDED VISUAL FIELD XM: CPT | Performed by: OPHTHALMOLOGY

## 2023-02-09 PROCEDURE — 92250 FUNDUS PHOTOGRAPHY W/I&R: CPT | Performed by: OPHTHALMOLOGY

## 2023-02-09 NOTE — ASSESSMENT & PLAN NOTE
Visual field and OCT completed in office today with stable results that were discussed with patient in office today. Fundus photos taken today  IOP is stable. Continue Latanoprost; one drop in both eyes at bedtime.     Will have patient back in 4 months for a pressure check

## 2023-02-09 NOTE — PATIENT INSTRUCTIONS
Primary open angle glaucoma of both eyes, mild stage  Visual field and OCT completed in office today with stable results that were discussed with patient in office today. Fundus photos taken today  IOP is stable. Continue Latanoprost; one drop in both eyes at bedtime. Will have patient back in 4 months for a pressure check    Age-related nuclear cataract of both eyes  Discussed early cataracts with patient. Told patient that cataracts are age appropriate and they are not surgical at this time. No treatment recommended at this time. Type 2 diabetes mellitus without retinopathy ([address]  Diabetes type II: no background of retinopathy, no signs of neovascularization noted. Discussed ocular and systemic benefits of blood sugar control. Diagnosis and treatment discussed in detail with patient.

## 2023-03-14 ENCOUNTER — OFFICE VISIT (OUTPATIENT)
Dept: DERMATOLOGY CLINIC | Facility: CLINIC | Age: 73
End: 2023-03-14

## 2023-03-14 DIAGNOSIS — L82.1 SEBORRHEIC KERATOSIS: Primary | ICD-10-CM

## 2023-03-14 PROCEDURE — 99212 OFFICE O/P EST SF 10 MIN: CPT | Performed by: STUDENT IN AN ORGANIZED HEALTH CARE EDUCATION/TRAINING PROGRAM

## 2023-03-27 DIAGNOSIS — I10 ESSENTIAL HYPERTENSION: ICD-10-CM

## 2023-03-28 RX ORDER — AMLODIPINE BESYLATE 5 MG/1
5 TABLET ORAL 2 TIMES DAILY
Qty: 180 TABLET | Refills: 3 | Status: SHIPPED | OUTPATIENT
Start: 2023-03-28

## 2023-03-28 NOTE — TELEPHONE ENCOUNTER
Refill passed per [facility], [facility] protocol. Requested Prescriptions   Pending Prescriptions Disp Refills    amLODIPine 5 MG Oral Tab 180 tablet 3     Sig: Take 1 tablet (5 mg total) by mouth 2 (two) times daily.        Hypertensive Medications Protocol Passed - 3/27/2023  3:20 PM        Passed - In person appointment in the past 12 or next 3 months     Recent Outpatient Visits              2 weeks ago Seborrheic keratosis    Tracey Dickerson MD    Office Visit    1 month ago Age-related nuclear cataract of both eyes    [address], [address], Uri Carreon MD    Office Visit    1 month ago     [address] [place]    Office Visit    1 month ago     [address] [place]    Office Visit    2 months ago [address], [address] [facility]    Office Visit          Future Appointments         Provider Department Appt Notes    In 2 months Jaquelin Sam MD [address], [address] [facility], [place] 4 mos    In 2 months Daniel Rodriguez MD [address], [address], [place] EP/ IOP check    In 6 months Siomara Hernandez MD [address], [address], [place] cystoscopy    In 7 months Claudia Mclain MD [address], [facility] full body               Passed - Last BP reading less than 140/90     BP Readings from Last 1 Encounters:  01/24/23 : 134/68              Passed - CMP or BMP in past 6 months     Recent Results (from the past 4392 hour(s))   Basic Metabolic Panel (8)    Collection Time: 01/24/23  2:42 AM   Result Value Ref Range    Glucose 182 (H) 70 - 99 mg/dL    Sodium 138 136 - 145 mmol/L    Potassium 4.3 3.5 - 5.1 mmol/L    Chloride 106 98 - 112 mmol/L    CO2 25.0 21.0 - 32.0 mmol/L    Anion Gap 7 0 - 18 mmol/L    BUN 21 (H) 7 - 18 mg/dL    Creatinine 0.95 0.70 - 1.30 mg/dL    BUN/CREA Ratio 22.1 (H) 10.0 - 20.0    Calcium, Total 9.4 8.5 - 10.1 mg/dL    Calculated Osmolality 294 275 - 295 mOsm/kg    eGFR-Cr 84 >=60 mL/min/1.73m2     *Note: Due to a large number of results and/or encounters for the requested time period, some results have not been displayed. A complete set of results can be found in Results Review.                Passed - In person appointment or virtual visit in the past 6 months     Recent Outpatient Visits              2 weeks ago Seborrheic keratosis    [address], 148 Akosua Ruiz MD    Office Visit    1 month ago Age-related nuclear cataract of both eyes    [address], [address] [facility] [facility], [place] Libby Maxwell MD    Office Visit    1 month ago     [address] [place]    Office Visit    1 month ago     [address] [place]    Office Visit    2 months ago [address], [place], [place] [address], [address]    Office Visit          Future Appointments         Provider Department Appt Notes    In 2 months Rodolfo De Souza MD [address], [address] [facility] 4 mos    In 2 months Libby Maxwell MD [address], [address] [facility], [facility] EP/ IOP check    In 6 months Alo James MD [address], [address] [facility], [place] cystoscopy    In 7 months Vickie Ritter MD [address], [facility] full body               Passed - [facility] or GFRNAA > 50     GFR Evaluation  EGFRCR: 84 , resulted on 1/24/2023              Recent Outpatient Visits              2 weeks ago Seborrheic keratosis    Akosua Villegas Sand, MD    Office Visit 1 month ago Age-related nuclear cataract of both eyes    Marciano Landaverde, [address], [place], Anton Julian MD    Office Visit    1 month ago     [address] [place]    Office Visit    1 month ago     [address] [place]    Office Visit    2 months ago [address] [place], [address], [facility], [facility] [address], [address] [facility], [place]    Office Visit           Future Appointments         Provider Department Appt Notes    In 2 months MD Marciano Hodge, [address] [facility], [employer] 4 mos    In 2 months MD Marciano Johnson, [address], [employer] EP/ IOP check    In 6 months MD Marciano Boykin, [address], [place] cystoscopy    In 7 months Monda Cushing, MD [address] Dicker, [facility] full body

## 2023-04-12 RX ORDER — LISINOPRIL 40 MG/1
TABLET ORAL
Qty: 90 TABLET | Refills: 0 | Status: SHIPPED | OUTPATIENT
Start: 2023-04-12 | End: 2023-04-14

## 2023-04-12 NOTE — TELEPHONE ENCOUNTER
LOV: 10/31/2022    RTC: 4 Months    FU: 6/12/2023     Last Refill: 1/18/2023    3 Month Supply Pending

## 2023-04-14 RX ORDER — LISINOPRIL 40 MG/1
TABLET ORAL
Qty: 90 TABLET | Refills: 0 | Status: SHIPPED | OUTPATIENT
Start: 2023-04-14

## 2023-04-17 NOTE — ED QUICK NOTES
Discharge instructions including follow-up care and medications were reviewed and discussed with patient. Pt verbalized understanding to all information and all questions asked were answered at this time. Pt is AAOx4, calm, respirations noted as even and unlabored, skin warm and dry, and there are no signs or symptoms of distress noted at this time. Pt wheeled out via W/C to wifes car into her care. unknown

## 2023-04-28 RX ORDER — METFORMIN HYDROCHLORIDE 500 MG/1
1000 TABLET, EXTENDED RELEASE ORAL 2 TIMES DAILY WITH MEALS
Qty: 360 TABLET | Refills: 0 | Status: SHIPPED | OUTPATIENT
Start: 2023-04-28

## 2023-05-22 RX ORDER — ICOSAPENT ETHYL 1000 MG/1
1 CAPSULE ORAL 2 TIMES DAILY
Qty: 180 CAPSULE | Refills: 3 | Status: SHIPPED | OUTPATIENT
Start: 2023-05-22

## 2023-05-22 NOTE — TELEPHONE ENCOUNTER
Refill passed per [facility], [facility] protocol.      Requested Prescriptions   Pending Prescriptions Disp Refills    VASCEPA 1 g Oral Cap [Pharmacy Med Name: Carmela Nieves 1GM] 180 capsule 3     Sig: TAKE 1 CAPSULE TWICE A DAY       Cholesterol Medication Protocol Passed - 5/21/2023 11:47 PM        Passed - ALT in past 12 months        Passed - LDL in past 12 months        Passed - Last ALT < 80     Lab Results   Component Value Date    ALT 58 08/16/2022             Passed - Last LDL < 130     Lab Results   Component Value Date    LDL 73 08/16/2022               Passed - In person appointment or virtual visit in the past 12 mos or appointment in next 3 mos     Recent Outpatient Visits              2 months ago Seborrheic keratosis    Magdy Koch MD    Office Visit    3 months ago Age-related nuclear cataract of both eyes    [address], [address], [place] Soulier, MD    Office Visit    3 months ago     [address], [place]    Office Visit    3 months ago     [address] [place]    Office Visit    3 months ago [address], [place], [place] [address] [address], [address] [facility]    Office Visit          Future Appointments         Provider Department Appt Notes    In 3 weeks Tali Balderas MD [address], 602 [facility] 4 mos    In 3 weeks Jay Flowers MD [address], [address], [facility] EP/ IOP check    In 4 months Boris Hand MD [address], [address], [place] cystoscopy    In 5 months Jannette Piña MD [address],[place] [id number] full body                     Recent Outpatient Visits              2 months ago Seborrheic keratosis    [address],[place] [address], Britt[place] Monda Cushing, MD    Office Visit    3 months ago Age-related nuclear cataract of both eyes    Marciano Landaverde, [address], [place], Anton Julian MD    Office Visit    3 months ago     [address] [place]    Office Visit    3 months ago     [address] [place]    Office Visit    3 months ago [address] [facility], [address], [place], [facility] [address], [address] [facility]    Office Visit             Future Appointments         Provider Department Appt Notes    In 3 weeks MD Marciano Hodge, [address] [facility], [place] 4 mos    In 3 weeks MD Marciano Johnson, [address], [place] EP/ IOP check    In 4 months MD Marciano Boykin, [address], [place] cystoscopy    In 5 months [facility]

## 2023-06-06 ENCOUNTER — ORDER TRANSCRIPTION (OUTPATIENT)
Dept: PHYSICAL THERAPY | Age: 73
End: 2023-06-06

## 2023-06-06 DIAGNOSIS — S46.911A RIGHT SHOULDER STRAIN: Primary | ICD-10-CM

## 2023-06-12 ENCOUNTER — OFFICE VISIT (OUTPATIENT)
Dept: ENDOCRINOLOGY CLINIC | Facility: CLINIC | Age: 73
End: 2023-06-12

## 2023-06-12 VITALS
HEIGHT: 68 IN | DIASTOLIC BLOOD PRESSURE: 68 MMHG | HEART RATE: 59 BPM | SYSTOLIC BLOOD PRESSURE: 129 MMHG | WEIGHT: 196 LBS | BODY MASS INDEX: 29.7 KG/M2

## 2023-06-12 DIAGNOSIS — E78.5 DYSLIPIDEMIA: ICD-10-CM

## 2023-06-12 DIAGNOSIS — E03.8 SUBCLINICAL HYPOTHYROIDISM: ICD-10-CM

## 2023-06-12 DIAGNOSIS — E11.65 TYPE 2 DIABETES MELLITUS WITH HYPERGLYCEMIA, UNSPECIFIED WHETHER LONG TERM INSULIN USE (HCC): Primary | ICD-10-CM

## 2023-06-12 LAB
CARTRIDGE LOT#: ABNORMAL NUMERIC
GLUCOSE BLOOD: 228
HEMOGLOBIN A1C: 7.6 % (ref 4.3–5.6)
TEST STRIP LOT #: NORMAL NUMERIC

## 2023-06-12 PROCEDURE — 82947 ASSAY GLUCOSE BLOOD QUANT: CPT | Performed by: INTERNAL MEDICINE

## 2023-06-12 PROCEDURE — 83036 HEMOGLOBIN GLYCOSYLATED A1C: CPT | Performed by: INTERNAL MEDICINE

## 2023-06-12 PROCEDURE — 99214 OFFICE O/P EST MOD 30 MIN: CPT | Performed by: INTERNAL MEDICINE

## 2023-06-12 RX ORDER — BLOOD-GLUCOSE METER
1 KIT MISCELLANEOUS DAILY
Qty: 100 STRIP | Refills: 0 | Status: SHIPPED | OUTPATIENT
Start: 2023-06-12

## 2023-06-14 ENCOUNTER — OFFICE VISIT (OUTPATIENT)
Dept: OPHTHALMOLOGY | Facility: CLINIC | Age: 73
End: 2023-06-14

## 2023-06-14 DIAGNOSIS — H40.1131 PRIMARY OPEN ANGLE GLAUCOMA OF BOTH EYES, MILD STAGE: Primary | ICD-10-CM

## 2023-06-14 PROCEDURE — 99213 OFFICE O/P EST LOW 20 MIN: CPT | Performed by: OPHTHALMOLOGY

## 2023-06-21 ENCOUNTER — TELEPHONE (OUTPATIENT)
Dept: ENDOCRINOLOGY CLINIC | Facility: CLINIC | Age: 73
End: 2023-06-21

## 2023-06-21 NOTE — TELEPHONE ENCOUNTER
Received fax from [employer] attached is [employer] for Diabetes supplies, form placed in provider folder for review and signature.

## 2023-06-29 ENCOUNTER — OFFICE VISIT (OUTPATIENT)
Dept: PHYSICAL THERAPY | Age: 73
End: 2023-06-29
Attending: ORTHOPAEDIC SURGERY
Payer: MEDICARE

## 2023-06-29 DIAGNOSIS — S46.911A RIGHT SHOULDER STRAIN: Primary | ICD-10-CM

## 2023-06-29 PROCEDURE — 97161 PT EVAL LOW COMPLEX 20 MIN: CPT | Performed by: PHYSICAL THERAPIST

## 2023-06-29 PROCEDURE — 97110 THERAPEUTIC EXERCISES: CPT | Performed by: PHYSICAL THERAPIST

## 2023-06-29 RX ORDER — ATENOLOL 50 MG/1
50 TABLET ORAL 2 TIMES DAILY
Qty: 180 TABLET | Refills: 0 | Status: SHIPPED | OUTPATIENT
Start: 2023-06-29

## 2023-07-06 ENCOUNTER — OFFICE VISIT (OUTPATIENT)
Dept: PHYSICAL THERAPY | Age: 73
End: 2023-07-06
Attending: ORTHOPAEDIC SURGERY
Payer: MEDICARE

## 2023-07-06 PROCEDURE — 97110 THERAPEUTIC EXERCISES: CPT | Performed by: PHYSICAL THERAPIST

## 2023-07-06 NOTE — PROGRESS NOTES
Diagnosis:    Right shoulder strain (W84.488I)   Referring Provider: Julio England  Date of Evaluation:    6/29/2023    Precautions:   DM 2 Next MD visit:   none scheduled  Date of Surgery: n/a   Insurance Primary/Secondary: MEDICARE / [address] [place] [address]     # Auth Visits:  POC every 10 visits       Subjective: Completing HEP. R shoulder is about the same, c/o pain with reaching out to side, sleeping is not affected by R UE. Pain: 0/10 at rest, c/o shoulder pain with movement       Objective:     AROM: (* denotes performed with pain)  Shoulder    Abduction: R 100*; L 140*  IR: R T9*; L T8       Assessment: Patient with continued R shoulder signs/symptoms suggestive of rotator cuff and possible biceps involvement. Pt with some similar, but more mild symptoms L shoulder. Advised pt to consider L shoulder ER using YTB in additional to wall slides, rows as well belén to address, as well belén. Pt tolerated session well. Goals:   (to be met in 8-10 visits)   Patient to report independence with PT HEP to facilitate self management and to maintain progress made in PT. Patient to have at least 150 deg active R shoulder abd to facilitate overhead ADL (haircare, reaching) and work. Patient to have active R shoulder IR to at least T9 to facilitate don/doff clothing, bathing, self care. Patient to have R shoulder flex, abd, ER MMT at least 4/+/5 for min c/o with reaching. Patient to report at least 50% improvement in R shoulder for increased ease with ADL. Plan: Continue PT 2 x weekly for 8-10 visits. F/u on response to today's session. Progress R shoulder AAROM, rotator cuff/periscapular strengthening, postural exercises. Date: 7/6/2023  TX#: 2/8-10 Date:                 TX#: 3/ Date:                 TX#: 4/ Date:                 TX#: 5/ Date: Tx#: 6/   There Ex:   HEP review/practice; see below.    B row using GTB - verbal cuing form x 10 reps  R shoulder ER using YTB x 10 reps  Wall slide - flex, abd x 10 reps each  S/L R shoulder abduction 0#  10 reps   S/L R shoulder ER 0# x 10 reps - pt reports easy; 1# x 10 reps  UBE: 2 min forward/2 min backward, level 1 (4 min total)  Standing R shoulder IR using GTB 2 x 10 reps   B low row using blue tubing 3 x 10 reps   Seated shoulder pulleys: flex, scap, abd x 10 reps each         Manual:   R GH posterior and inferior glides grade II -III   PROM R shoulder IR (PROM flex, abd, ER each WNL, PROM not necessary)                     HEP: standing R shoulder ER, wall slides - flex/abd, standing row using GTB, s/l shoulder abduction     Charges: 3 TE (35 min), 0 manual (5 min)      Total Timed Treatment: 40 min  Total Treatment Time: 45 min

## 2023-07-10 ENCOUNTER — OFFICE VISIT (OUTPATIENT)
Dept: PHYSICAL THERAPY | Age: 73
End: 2023-07-10
Attending: ORTHOPAEDIC SURGERY
Payer: MEDICARE

## 2023-07-10 PROCEDURE — 97110 THERAPEUTIC EXERCISES: CPT | Performed by: PHYSICAL THERAPIST

## 2023-07-10 NOTE — PROGRESS NOTES
Diagnosis:    Right shoulder strain (T67.420D)   Referring Provider: Tico Johnson  Date of Evaluation:    6/29/2023    Precautions:   DM 2 Next MD visit:   none scheduled  Date of Surgery: n/a   Insurance Primary/Secondary: MEDICARE / [address] [place] [address]     # Auth Visits:  POC every 10 visits       Subjective:  R shoulder more noticeable. Popping with yellow band exercise - more of an observation, deny c/o. Completing HEP. Pain: 0/10 at rest, c/o 3/10 shoulder pain with movement       Objective:     AROM: (* denotes performed with pain)  Shoulder    Abduction: R ~130*; L 140*  IR: R T9*; L T8     Assessment:   Patient with some improvement in R shoulder AROM since initiating PT, but c/o pain persists. Limited subjective progress thus far. Reviewed R shoulder ER strengthening options (s/l or standing using YTB) due to pt c/o popping with band version at home. Pt denied popping today when completed/practiced. Patient with continued R shoulder signs/symptoms suggestive of rotator cuff and possible biceps involvement. Pt tolerated session well overall. Goals:   (to be met in 8-10 visits)   Patient to report independence with PT HEP to facilitate self management and to maintain progress made in PT. Patient to have at least 150 deg active R shoulder abd to facilitate overhead ADL (haircare, reaching) and work. Patient to have active R shoulder IR to at least T9 to facilitate don/doff clothing, bathing, self care. Patient to have R shoulder flex, abd, ER MMT at least 4/+/5 for min c/o with reaching. Patient to report at least 50% improvement in R shoulder for increased ease with ADL. Plan: Continue PT 2 x weekly for 8-10 visits. F/u on response to today's session. Progress R shoulder AAROM, rotator cuff/periscapular strengthening, postural exercises. Date: 7/6/2023  TX#: 2/8-10 Date:  7/10/2023             TX#: 3/8-10 Date:                 TX#: 4/ Date:                 TX#: 5/ Date:    Tx#: 6/ There Ex:   HEP review/practice; see below.    B row using GTB - verbal cuing form x 10 reps  R shoulder ER using YTB x 10 reps  Wall slide - flex, abd x 10 reps each  S/L R shoulder abduction 0#  10 reps   S/L R shoulder ER 0# x 10 reps - pt reports easy; 1# x 10 reps  UBE: 2 min forward/2 min backward, level 1 (4 min total)  Standing R shoulder IR using GTB 2 x 10 reps   B low row using blue tubing 3 x 10 reps   Seated shoulder pulleys: flex, scap, abd x 10 reps each   There Ex:   UBE: 2 min forward/2 min backward, level 1 (4 min total)  Reviewed/practiced ER strengthening options due to c/o popping with YTB: standing YTB x 10 reps; s/l R shoulder ER 1# x 10 reps, 2# x 10 reps   S/L R shoulder abduction 0# x 10 reps, 1# x 10 reps, 2# x 10 reps - progress next session   Hip hinge row R UE 0# x 3 reps; 5# x 20 reps  Standing R shoulder IR using GTB 2 x 10 reps   Wall circles using towel 2 x 5 reps each clockwise/counterclockwise      Manual:   R GH posterior and inferior glides grade II -III   PROM R shoulder IR (PROM flex, abd, ER each WNL, PROM not necessary) Manual:   R GH posterior and inferior glides grade II -III   PROM R shoulder IR (PROM flex, abd, ER each WNL but impingement end range)                    HEP: standing R shoulder ER, wall slides - flex/abd, standing row using GTB (progress to blue TB)    Charges: 3 TE (35 min), 0 manual (5 min)      Total Timed Treatment: 40 min  Total Treatment Time: 45 min

## 2023-07-12 ENCOUNTER — OFFICE VISIT (OUTPATIENT)
Dept: INTERNAL MEDICINE CLINIC | Facility: CLINIC | Age: 73
End: 2023-07-12

## 2023-07-12 VITALS
RESPIRATION RATE: 18 BRPM | WEIGHT: 194 LBS | SYSTOLIC BLOOD PRESSURE: 144 MMHG | HEART RATE: 64 BPM | HEIGHT: 68 IN | BODY MASS INDEX: 29.4 KG/M2 | DIASTOLIC BLOOD PRESSURE: 66 MMHG | TEMPERATURE: 98 F

## 2023-07-12 DIAGNOSIS — G31.84 MCI (MILD COGNITIVE IMPAIRMENT): ICD-10-CM

## 2023-07-12 DIAGNOSIS — E11.9 TYPE 2 DIABETES MELLITUS WITHOUT COMPLICATION, WITHOUT LONG-TERM CURRENT USE OF INSULIN (HCC): Primary | ICD-10-CM

## 2023-07-12 DIAGNOSIS — C67.9 MALIGNANT NEOPLASM OF URINARY BLADDER, UNSPECIFIED SITE (HCC): ICD-10-CM

## 2023-07-12 DIAGNOSIS — R97.20 ELEVATED PSA: ICD-10-CM

## 2023-07-12 DIAGNOSIS — E78.5 HYPERLIPIDEMIA, UNSPECIFIED HYPERLIPIDEMIA TYPE: ICD-10-CM

## 2023-07-12 DIAGNOSIS — I10 ESSENTIAL HYPERTENSION: ICD-10-CM

## 2023-07-12 DIAGNOSIS — I65.29 CAROTID ATHEROSCLEROSIS, UNSPECIFIED LATERALITY: ICD-10-CM

## 2023-07-12 DIAGNOSIS — Z12.5 SCREENING FOR PROSTATE CANCER: ICD-10-CM

## 2023-07-12 PROCEDURE — 99214 OFFICE O/P EST MOD 30 MIN: CPT | Performed by: INTERNAL MEDICINE

## 2023-07-12 PROCEDURE — 1126F AMNT PAIN NOTED NONE PRSNT: CPT | Performed by: INTERNAL MEDICINE

## 2023-07-12 RX ORDER — HYDRALAZINE HYDROCHLORIDE 10 MG/1
10 TABLET, FILM COATED ORAL 3 TIMES DAILY
Qty: 270 TABLET | Refills: 3 | Status: SHIPPED | OUTPATIENT
Start: 2023-07-12

## 2023-07-13 ENCOUNTER — OFFICE VISIT (OUTPATIENT)
Dept: PHYSICAL THERAPY | Age: 73
End: 2023-07-13
Attending: ORTHOPAEDIC SURGERY
Payer: MEDICARE

## 2023-07-13 PROCEDURE — 97110 THERAPEUTIC EXERCISES: CPT | Performed by: PHYSICAL THERAPIST

## 2023-07-13 NOTE — PROGRESS NOTES
Diagnosis:    Right shoulder strain (H79.364F)   Referring Provider: Sena Piper  Date of Evaluation:    6/29/2023    Precautions:   DM 2 Next MD visit:   8/2023  Date of Surgery: n/a   Insurance Primary/Secondary: MEDICARE / Esther Zaldivar     # Auth Visits:  POC every 10 visits       Subjective:  R shoulder is about the same. Have not completed HEP since Monday. Deny any questions. Pain: 0/10 at rest, c/o 2/10 shoulder pain with movement       Objective:     AROM: (* denotes performed with pain)  Shoulder    Abduction: R ~130*; L 140*  IR: R T9*; L T8     Assessment:   Patient with improvement in R shoulder AROM since initiating PT, but c/o pain persists. Pt tolerating there ex progression well. Limited subjective progress thus far. Patient with continued R shoulder signs/symptoms suggestive of rotator cuff and possible biceps involvement. Pt tolerated session well overall. Will monitor pt for consistent progress. Goals:   (to be met in 8-10 visits)   Patient to report independence with PT HEP to facilitate self management and to maintain progress made in PT. Patient to have at least 150 deg active R shoulder abd to facilitate overhead ADL (haircare, reaching) and work. Patient to have active R shoulder IR to at least T9 to facilitate don/doff clothing, bathing, self care. Patient to have R shoulder flex, abd, ER MMT at least 4/+/5 for min c/o with reaching. Patient to report at least 50% improvement in R shoulder for increased ease with ADL. Plan: Continue PT 2 x weekly for 8-10 visits. Monitor for consistent progress. Progress R shoulder AAROM, rotator cuff/periscapular strengthening, postural exercises. Date: 7/6/2023  TX#: 2/8-10 Date:  7/10/2023             TX#: 3/8-10 Date:   7/13/2023              TX#: 4/8-10 Date:                 TX#: 5/ Date: Tx#: 6/ There Ex:   HEP review/practice; see below.    B row using GTB - verbal cuing form x 10 reps  R shoulder ER using YTB x 10 reps  Wall slide - flex, abd x 10 reps each  S/L R shoulder abduction 0#  10 reps   S/L R shoulder ER 0# x 10 reps - pt reports easy; 1# x 10 reps  UBE: 2 min forward/2 min backward, level 1 (4 min total)  Standing R shoulder IR using GTB 2 x 10 reps   B low row using blue tubing 3 x 10 reps   Seated shoulder pulleys: flex, scap, abd x 10 reps each   There Ex:   UBE: 2 min forward/2 min backward, level 1 (4 min total)  Reviewed/practiced ER strengthening options due to c/o popping with YTB: standing YTB x 10 reps; s/l R shoulder ER 1# x 10 reps, 2# x 10 reps   S/L R shoulder abduction 0# x 10 reps, 1# x 10 reps, 2# x 10 reps - progress next session   Hip hinge row R UE 0# x 3 reps; 5# x 20 reps  Standing R shoulder IR using GTB 2 x 10 reps   Wall circles using towel 2 x 5 reps each clockwise/counterclockwise There Ex:   UBE: 2 min forward/2 min backward, level 1 (4 min total)  Supine B shoulder horizontal abduction using RTB x 20 reps  Supine B shoulder ER  x 5 reps - symptoms, stopped; using YTB  2 x 10   S/L R shoulder abduction 0# x 10 reps, 1# 2 x 10 reps, 2# - pt declined 2# today  Seated thoracic spine ext/pec stretch 5 x 5 sec each  B low row using blue tubing x 30 reps  Standing R shoulder IR using GTB 2 x 10 reps   R shoulder hand behind back IR stretch using green strap 10 x 3 sec  Wall circles using towel 2 x 5 reps each clockwise/counterclockwise     Manual:   R GH posterior and inferior glides grade II -III   PROM R shoulder IR (PROM flex, abd, ER each WNL, PROM not necessary) Manual:   R GH posterior and inferior glides grade II -III   PROM R shoulder IR (PROM flex, abd, ER each WNL but impingement end range) Manual:   R GH posterior and inferior glides grade II -III   PROM R shoulder - not completed due to ROM WNL                   HEP: standing R shoulder ER using yellow band, wall slides - flex/abd, standing row using GTB (progress to blue TB)    Charges: 3 TE (40 min), 0 manual (5 min)      Total Timed Treatment: 45 min  Total Treatment Time: 45 min

## 2023-07-17 ENCOUNTER — OFFICE VISIT (OUTPATIENT)
Dept: PHYSICAL THERAPY | Age: 73
End: 2023-07-17
Attending: ORTHOPAEDIC SURGERY
Payer: MEDICARE

## 2023-07-17 PROCEDURE — 97110 THERAPEUTIC EXERCISES: CPT | Performed by: PHYSICAL THERAPIST

## 2023-07-20 ENCOUNTER — OFFICE VISIT (OUTPATIENT)
Dept: PHYSICAL THERAPY | Age: 73
End: 2023-07-20
Attending: ORTHOPAEDIC SURGERY
Payer: MEDICARE

## 2023-07-20 ENCOUNTER — TELEPHONE (OUTPATIENT)
Dept: ENDOCRINOLOGY CLINIC | Facility: CLINIC | Age: 73
End: 2023-07-20

## 2023-07-20 PROCEDURE — 97110 THERAPEUTIC EXERCISES: CPT | Performed by: PHYSICAL THERAPIST

## 2023-07-20 NOTE — TELEPHONE ENCOUNTER
Pharmacy is calling states that they received a medical necessity form for testing 3 times daily but did not receive a script

## 2023-07-20 NOTE — PROGRESS NOTES
Diagnosis:    Right shoulder strain (R40.473J)   Referring Provider: David Patel  Date of Evaluation:    6/29/2023    Precautions:   DM 2 Next MD visit:   8/2023  Date of Surgery: n/a   Insurance Primary/Secondary: MEDICARE / [employer]     # Auth Visits:  POC every 10 visits       Subjective:  R shoulder is 80% better. 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 C/o stiff neck from looking down to fix piano; using MHP which helps. Completing HEP. Unable to attend PT next week due to traveling to [facility] for a piano conference. Deny c/o following last PT session. Pain: 0/10 at rest    Objective:     AROM: (* denotes performed with pain)  Shoulder    Abduction: R 160*c/o end range  IR: R ~ T9*      R shoulder PROM grossly WNL, impingement/c/o end range flex/abd    Accessory motion: Hypomobile L/R GH post glide    Flexibility: restriction B pec length    Strength/MMT: (* denotes performed with pain)  Shoulder   Abduction: R 4/5*; L 5/5  ER: R 4/5     Assessment:   Patient with improved R shoulder AROM, MMT and ADL tolerance since initiating PT, but deficits persist.  C/o continue to be suggestive of rotator cuff involvement, impingement. Pt may benefit from limited number of continued PT sessions with goal of discharge to HEP within 2-4 sessions due to progress/near independence with PT HEP, physician f/u for any unresolved c/o/concerns. Pt fatigued with PT session but tolerated well. Reviewed considerations for neck c/o; see below. Goals:   (to be met in 8-10 visits)   Patient to report independence with PT HEP to facilitate self management and to maintain progress made in PT. Patient to have at least 150 deg active R shoulder abd to facilitate overhead ADL (haircare, reaching) and work. Patient to have active R shoulder IR to at least T9 to facilitate don/doff clothing, bathing, self care.    Patient to have R shoulder flex, abd, ER MMT at least 4/+/5 for min c/o with reaching. Patient to report at least 50% improvement in R shoulder for increased ease with ADL. Plan: Continue PT 2 x weekly for 8-10 visits. Pt traveling x ~ 1 week, will complete HEP while away from PT. See Assessment. Progress R shoulder AAROM, rotator cuff/periscapular strengthening, postural exercises.   Date:  7/10/2023             TX#: 3/8-10 Date:   7/13/2023              TX#: 4/8-10 Date: 7/17/2023                TX#: 5/8-10 Date: 7/20/2023  Tx#: 6/8-10   There Ex:   UBE: 2 min forward/2 min backward, level 1 (4 min total)  Reviewed/practiced ER strengthening options due to c/o popping with YTB: standing YTB x 10 reps; s/l R shoulder ER 1# x 10 reps, 2# x 10 reps   S/L R shoulder abduction 0# x 10 reps, 1# x 10 reps, 2# x 10 reps - progress next session   Hip hinge row R UE 0# x 3 reps; 5# x 20 reps  Standing R shoulder IR using GTB 2 x 10 reps   Wall circles using towel 2 x 5 reps each clockwise/counterclockwise There Ex:   UBE: 2 min forward/2 min backward, level 1 (4 min total)  Supine B shoulder horizontal abduction using RTB x 20 reps  Supine B shoulder ER  x 5 reps - symptoms, stopped; using YTB  2 x 10   S/L R shoulder abduction 0# x 10 reps, 1# 2 x 10 reps, 2# - pt declined 2# today  Seated thoracic spine ext/pec stretch 5 x 5 sec each  B low row using blue tubing x 30 reps  Standing R shoulder IR using GTB 2 x 10 reps   R shoulder hand behind back IR stretch using green strap 10 x 3 sec  Wall circles using towel 2 x 5 reps each clockwise/counterclockwise There Ex:   UBE:  1 min forward, 1 min backward, level 1 (2 min total)  Seated thoracic spine ext/pec stretch 5 x 5 sec each  B low row using using blue tubing x 20 reps  Standing B shoulder ER using YTB x 10 reps, supine x 10 reps   Standing B shoulder horiz abd using YTB, supine x 10 reps   S/L shoulder abduction: R 0# x 5 reps, 1# x 12 reps, 2#  x 3  reps - stopped; L 0# x 5 reps, 1# x 12 reps  Standing R shoulder IR using  blue TB x 15 reps   R shoulder hand behind back IR stretch using green strap x 5 reps  Wall circles using towel x 7 reps each clockwise/counterclockwise       There Ex:   UBE: 1.5 min forward/1.5 min backward (3 min total), level 1  Seated thoracic spine ext/pec stretch 5 x 5 sec each  B low row using using blue tubing x 20 reps  Supine B shoulder ER using YTB x 10 reps - pt c/o, stopped  Standing/supine B shoulder horiz abd using YTB, supine x 10 reps each  S/L R shoulder ER 0# x 10 reps, 1# x 10 reps  S/L shoulder abduction: R 0# x 5 reps, 1# x 12 reps, 2#  x 3  reps - stopped; L 0# x 5 reps, 1# x 12 reps  Standing R shoulder IR using  blue TB- not completed, insufficient time  R shoulder hand behind back IR stretch using green strap - pt fatigued, not complted  Clock wall reaches x 4 reps each 9,10,12,1, 3 o'clock  HEP review; see below         Manual:   R GH posterior and inferior glides grade II -III   PROM R shoulder IR (PROM flex, abd, ER each WNL but impingement end range) Manual:   R GH posterior and inferior glides grade II -III   PROM R shoulder - not completed due to ROM WNL Manual:   B pec stretch 3 x 20 sec each   L/R GH posterior and inferior glides grade II -III  Manual:   B pec stretch 3 x 20 sec each   L/R GH posterior and inferior glides grade II -III       There Activities:  Re: c/o L sided neck pain, advised pt to consider: pillow support for neutral cervical spine for sleeping, MHP 10-15 reps, postural awareness         HEP: standing R shoulder ER using yellow band, wall slides - flex/abd, standing row using GTB (progress to blue TB)    Charges: 3 TE (35 min), 0 manual (5 min), 0 TA (5 min)     Total Timed Treatment: 45 min  Total Treatment Time: 45 min

## 2023-07-24 ENCOUNTER — APPOINTMENT (OUTPATIENT)
Dept: PHYSICAL THERAPY | Age: 73
End: 2023-07-24
Attending: ORTHOPAEDIC SURGERY
Payer: MEDICARE

## 2023-07-25 RX ORDER — LANCETS 28 GAUGE
EACH MISCELLANEOUS
Qty: 300 EACH | Refills: 1 | Status: SHIPPED | OUTPATIENT
Start: 2023-07-25

## 2023-07-25 RX ORDER — BLOOD-GLUCOSE METER
KIT MISCELLANEOUS
Qty: 300 STRIP | Refills: 1 | Status: SHIPPED | OUTPATIENT
Start: 2023-07-25

## 2023-07-27 ENCOUNTER — APPOINTMENT (OUTPATIENT)
Dept: PHYSICAL THERAPY | Age: 73
End: 2023-07-27
Attending: ORTHOPAEDIC SURGERY
Payer: MEDICARE

## 2023-07-31 ENCOUNTER — OFFICE VISIT (OUTPATIENT)
Dept: PHYSICAL THERAPY | Age: 73
End: 2023-07-31
Attending: ORTHOPAEDIC SURGERY
Payer: MEDICARE

## 2023-07-31 PROCEDURE — 97140 MANUAL THERAPY 1/> REGIONS: CPT | Performed by: PHYSICAL THERAPIST

## 2023-07-31 PROCEDURE — 97110 THERAPEUTIC EXERCISES: CPT | Performed by: PHYSICAL THERAPIST

## 2023-07-31 NOTE — PROGRESS NOTES
Diagnosis:    Right shoulder strain (F23.112S)   Referring Provider: Lilly Jacob  Date of Evaluation:    6/29/2023    Precautions:   DM 2 Next MD visit:   8/2023  Date of Surgery: n/a   Insurance Primary/Secondary: MEDICARE / [employer]     # Auth Visits:  POC every 10 visits       Subjective: Less HEP compliance while traveling last week. Did not notice my shoulder too much. 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 C/o hand tremor with UE use;  have discussed with PCP, told me that it is normal with aging. C/o bump L side of neck. Pain: 0/10 at rest    Objective:     + increased tone/c/o L upper trap/suboccipitals    AROM: (* denotes performed with pain)  Shoulder    Flexion: R 160 deg, L 165 deg  Abduction: R 170 deg, painful arc  ~ 160 deg  IR: R ~ T9, L T7  ER ~ T4* c/o R      Mild R altered shoulder mechanics      Strength/MMT: (* denotes performed with pain)  Shoulder   Flexion: R 4/5*; L 5-/5  Abduction: R 4/5*; L 4/5*  ER: R 4+/5*; L 5/5*  IR: R 5/5; L 5/5     Special tests:   Negative Speed's L/R  + R/L Empty can    Assessment:   Patient progressing well overall, improved tolerance for R/L shoulder abduction and ADL. Pt with some c/o neck discomfort. Pt with increased tone L suboccipitals/upper trap. Completed manual therapy for c/o, reviewed ergonomics/pillow support; see below. Advised PCP consultation re: c/o tremors affecting playing piano and fine motor skills. Goals:   (to be met in 8-10 visits)   Patient to report independence with PT HEP to facilitate self management and to maintain progress made in PT. Patient to have at least 150 deg active R shoulder abd to facilitate overhead ADL (haircare, reaching) and work. Patient to have active R shoulder IR to at least T9 to facilitate don/doff clothing, bathing, self care. Patient to have R shoulder flex, abd, ER MMT at least 4/+/5 for min c/o with reaching.    Patient to report at least 50% improvement in R shoulder for increased ease with ADL. Plan: Continue PT 2 x weekly for 8-10 visits. See Assessment. F/u on suboccipital c/o. Progress R shoulder AAROM, rotator cuff/periscapular strengthening, postural exercises.   Date:  7/10/2023             TX#: 3/8-10 Date:   7/13/2023              TX#: 4/8-10 Date: 7/17/2023                TX#: 5/8-10 Date: 7/20/2023  Tx#: 6/8-10 Date: 7/31/2023  Tx #: 7/8-10   There Ex:   UBE: 2 min forward/2 min backward, level 1 (4 min total)  Reviewed/practiced ER strengthening options due to c/o popping with YTB: standing YTB x 10 reps; s/l R shoulder ER 1# x 10 reps, 2# x 10 reps   S/L R shoulder abduction 0# x 10 reps, 1# x 10 reps, 2# x 10 reps - progress next session   Hip hinge row R UE 0# x 3 reps; 5# x 20 reps  Standing R shoulder IR using GTB 2 x 10 reps   Wall circles using towel 2 x 5 reps each clockwise/counterclockwise There Ex:   UBE: 2 min forward/2 min backward, level 1 (4 min total)  Supine B shoulder horizontal abduction using RTB x 20 reps  Supine B shoulder ER  x 5 reps - symptoms, stopped; using YTB  2 x 10   S/L R shoulder abduction 0# x 10 reps, 1# 2 x 10 reps, 2# - pt declined 2# today  Seated thoracic spine ext/pec stretch 5 x 5 sec each  B low row using blue tubing x 30 reps  Standing R shoulder IR using GTB 2 x 10 reps   R shoulder hand behind back IR stretch using green strap 10 x 3 sec  Wall circles using towel 2 x 5 reps each clockwise/counterclockwise There Ex:   UBE:  1 min forward, 1 min backward, level 1 (2 min total)  Seated thoracic spine ext/pec stretch 5 x 5 sec each  B low row using using blue tubing x 20 reps  Standing B shoulder ER using YTB x 10 reps, supine x 10 reps   Standing B shoulder horiz abd using YTB, supine x 10 reps   S/L shoulder abduction: R 0# x 5 reps, 1# x 12 reps, 2#  x 3  reps - stopped; L 0# x 5 reps, 1# x 12 reps  Standing R shoulder IR using  blue TB x 15 reps   R shoulder hand behind back IR stretch using green strap x 5 reps  Wall circles using towel x 7 reps each clockwise/counterclockwise       There Ex:   UBE: 1.5 min forward/1.5 min backward (3 min total), level 1  Seated thoracic spine ext/pec stretch 5 x 5 sec each  B low row using using blue tubing x 20 reps  Supine B shoulder ER using YTB x 10 reps - pt c/o, stopped  Standing/supine B shoulder horiz abd using YTB, supine x 10 reps each  S/L R shoulder ER 0# x 10 reps, 1# x 10 reps  S/L shoulder abduction: R 0# x 5 reps, 1# x 12 reps, 2#  x 3  reps - stopped; L 0# x 5 reps, 1# x 12 reps  Standing R shoulder IR using  blue TB- not completed, insufficient time  R shoulder hand behind back IR stretch using green strap - pt fatigued, not complted  Clock wall reaches x 4 reps each 9,10,12,1, 3 o'clock  HEP review; see below There Ex:   HEP review/practice; see below.    B shoulder ER using YTB 2 x 10 reps  B shoudler horiz abd using YTB 2 x 10 reps  Diagonal pull aparts using YTB 2 x 10 reps   Hand behind back stretch using strap 5 x 5 sec each L/R UE (pt requested to complete L UE as well)          Manual:   R GH posterior and inferior glides grade II -III   PROM R shoulder IR (PROM flex, abd, ER each WNL but impingement end range) Manual:   R GH posterior and inferior glides grade II -III   PROM R shoulder - not completed due to ROM WNL Manual:   B pec stretch 3 x 20 sec each   L/R GH posterior and inferior glides grade II -III  Manual:   B pec stretch 3 x 20 sec each   L/R GH posterior and inferior glides grade II -III  Manual:   B pec stretch 3 x 15 sec each  Myofascial release L > R upper traps  Suboccipital release 3 x 15 sec each      There Activities:  Re: c/o L sided neck pain, advised pt to consider: pillow support for neutral cervical spine for sleeping, MHP 10-15 reps, postural awareness There Activities:   Briefly reviewed ergonomics for  piano tuning  Pillow support for sleeping          HEP: standing R shoulder ER using yellow band, wall slides - flex/abd, standing row using GTB (progress to blue TB), B shoulder horiz abd    Charges: 2 TE (25 min), 1 manual (10 min), 0 TA (5 min)     Total Timed Treatment: 40 min  Total Treatment Time: 45 min

## 2023-08-03 ENCOUNTER — APPOINTMENT (OUTPATIENT)
Dept: PHYSICAL THERAPY | Age: 73
End: 2023-08-03
Attending: INTERNAL MEDICINE
Payer: MEDICARE

## 2023-08-09 ENCOUNTER — PATIENT MESSAGE (OUTPATIENT)
Dept: OTOLARYNGOLOGY | Facility: CLINIC | Age: 73
End: 2023-08-09

## 2023-08-09 ENCOUNTER — OFFICE VISIT (OUTPATIENT)
Dept: PHYSICAL THERAPY | Age: 73
End: 2023-08-09
Attending: INTERNAL MEDICINE
Payer: MEDICARE

## 2023-08-09 PROCEDURE — 97140 MANUAL THERAPY 1/> REGIONS: CPT | Performed by: PHYSICAL THERAPIST

## 2023-08-09 PROCEDURE — 97110 THERAPEUTIC EXERCISES: CPT | Performed by: PHYSICAL THERAPIST

## 2023-08-09 NOTE — PROGRESS NOTES
Diagnosis:    Right shoulder strain (B45.535N)   Referring Provider: Yuri Rudd Date of Evaluation:    6/29/2023    Precautions:   DM 2 Next MD visit:   8/2023  Date of Surgery: n/a   Insurance Primary/Secondary: MEDICARE / [facility]     # Auth Visits:  POC every 10 visits       Subjective:  C/o shoulder pain with abduction. 70% better since started PT. Min HEP compliance. 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 Pain: 0/10 at rest    Objective:     + increased tone/c/o L upper trap/suboccipitals    AROM: (* denotes performed with pain)  Shoulder    Flexion: R 160 deg, L 165 deg  Abduction: R 170 deg, painful arc   IR: R ~ T9, L T7  ER ~ T4* c/o R      Mild R altered shoulder mechanics with R UE reaching      Strength/MMT: (* denotes performed with pain)  Shoulder   Flexion: R 4/5*; L 5-/5  Abduction: R 4/5*; L 4/5*  ER: R 4+/5*; L 5/5*  IR: R 5/5; L 5/5       Assessment:   Pt progressing - less shoulder c/o improved ADL tolerance. Pt with similar c/o L/R shoulders; treating B today. Prioritized HEP exercises to facilitate compliance. Incorporated interventions for suboccipital c/o and discussed considerations to prevent c/o; see below. Advised PCP consultation re: c/o tremors affecting playing piano and fine motor skills. PT challenged but tolerated session well. Goals:   (to be met in 8-10 visits)   Patient to report independence with PT HEP to facilitate self management and to maintain progress made in PT. Patient to have at least 150 deg active R shoulder abd to facilitate overhead ADL (haircare, reaching) and work. Patient to have active R shoulder IR to at least T9 to facilitate don/doff clothing, bathing, self care. Patient to have R shoulder flex, abd, ER MMT at least 4/+/5 for min c/o with reaching. Patient to report at least 50% improvement in R shoulder for increased ease with ADL. Plan: Continue PT 2 x weekly for up to 10 visits. See Assessment. F/u on suboccipital c/o. Progress R shoulder AAROM, rotator cuff/periscapular strengthening, postural exercises. Date: 7/17/2023                TX#: 5/8-10 Date: 7/20/2023  Tx#: 6/8-10 Date: 7/31/2023  Tx #: 7/8-10 Date: 8/9/2023  Tx # 8/8-10   There Ex:   UBE:  1 min forward, 1 min backward, level 1 (2 min total)  Seated thoracic spine ext/pec stretch 5 x 5 sec each  B low row using using blue tubing x 20 reps  Standing B shoulder ER using YTB x 10 reps, supine x 10 reps   Standing B shoulder horiz abd using YTB, supine x 10 reps   S/L shoulder abduction: R 0# x 5 reps, 1# x 12 reps, 2#  x 3  reps - stopped; L 0# x 5 reps, 1# x 12 reps  Standing R shoulder IR using  blue TB x 15 reps   R shoulder hand behind back IR stretch using green strap x 5 reps  Wall circles using towel x 7 reps each clockwise/counterclockwise       There Ex:   UBE: 1.5 min forward/1.5 min backward (3 min total), level 1  Seated thoracic spine ext/pec stretch 5 x 5 sec each  B low row using using blue tubing x 20 reps  Supine B shoulder ER using YTB x 10 reps - pt c/o, stopped  Standing/supine B shoulder horiz abd using YTB, supine x 10 reps each  S/L R shoulder ER 0# x 10 reps, 1# x 10 reps  S/L shoulder abduction: R 0# x 5 reps, 1# x 12 reps, 2#  x 3  reps - stopped; L 0# x 5 reps, 1# x 12 reps  Standing R shoulder IR using  blue TB- not completed, insufficient time  R shoulder hand behind back IR stretch using green strap - pt fatigued, not complted  Clock wall reaches x 4 reps each 9,10,12,1, 3 o'clock  HEP review; see below There Ex:   HEP review/practice; see below.    B shoulder ER using YTB 2 x 10 reps  B shoudler horiz abd using YTB 2 x 10 reps  Diagonal pull aparts using YTB 2 x 10 reps   Hand behind back stretch using strap 5 x 5 sec each L/R UE (pt requested to complete L UE as well) There Ex:   HEP review, including exercise priorities to facilitate compliance  Seated thoracic spine ext/pec - shoulder ER stretch  5 x 10 sec each   UBE: 1.5 min forward/1.5 min (3 min total), level 1   B shoulder ER using YTB  x 10 reps- pt declined 2nd set, reported fatigue  B shoudler horiz abd using YTB  x 10 reps- pt declined 2nd set, reported fatigue  Diagonal pull aparts using YTB  x 10 reps - pt declined 2nd set, reported fatigue  L/R hand behind back IR AAROM using strap 5 x 5 sec each  Standing L/R shoulder abduction wall slide x 10 reps each           Manual:   B pec stretch 3 x 20 sec each   L/R GH posterior and inferior glides grade II -III  Manual:   B pec stretch 3 x 20 sec each   L/R GH posterior and inferior glides grade II -III  Manual:   B pec stretch 3 x 15 sec each  Myofascial release L > R upper traps  Suboccipital release 3 x 15 sec each Manual:   B pec stretch 3 x 15 sec each  Myofascial release L > R upper traps  Suboccipital release 3 x 15 sec each  R GH posterior and inferior glides grade II-III  R shoulder PROM WNL - not completed    There Activities:  Re: c/o L sided neck pain, advised pt to consider: pillow support for neutral cervical spine for sleeping, MHP 10-15 reps, postural awareness There Activities:   Briefly reviewed ergonomics for  piano tuning  Pillow support for sleeping There Activities:   Pt education/considerations for neck c/o: MHP, STM, stretches/breaks if has to look downward for longer period of time to tune piano - cervical spine ext, seated cervical/thoracic spine ext/pec stretch, posture at wall, pec stretch in door frame         HEP: standing R shoulder ER using yellow band*, wall slides - flex/abd*, standing row using GTB (progress to blue TB), B shoulder horiz abd.  * = priorities if short on time    Charges: 2 TE (25 min), 1 manual (10 min), 0 TA (5 min)     Total Timed Treatment: 40 min  Total Treatment Time: 45 min

## 2023-08-10 ENCOUNTER — OFFICE VISIT (OUTPATIENT)
Dept: OTOLARYNGOLOGY | Facility: CLINIC | Age: 73
End: 2023-08-10

## 2023-08-10 VITALS — HEIGHT: 68 IN | WEIGHT: 194 LBS | BODY MASS INDEX: 29.4 KG/M2

## 2023-08-10 DIAGNOSIS — R09.82 POSTNASAL DISCHARGE: ICD-10-CM

## 2023-08-10 DIAGNOSIS — H61.21 IMPACTED CERUMEN OF RIGHT EAR: Primary | ICD-10-CM

## 2023-08-10 PROCEDURE — 69210 REMOVE IMPACTED EAR WAX UNI: CPT | Performed by: OTOLARYNGOLOGY

## 2023-08-10 PROCEDURE — 99213 OFFICE O/P EST LOW 20 MIN: CPT | Performed by: OTOLARYNGOLOGY

## 2023-08-10 RX ORDER — MONTELUKAST SODIUM 10 MG/1
10 TABLET ORAL NIGHTLY
Qty: 30 TABLET | Refills: 3 | Status: SHIPPED | OUTPATIENT
Start: 2023-08-10

## 2023-08-10 RX ORDER — AZELASTINE 1 MG/ML
2 SPRAY, METERED NASAL 2 TIMES DAILY
Qty: 30 ML | Refills: 3 | Status: SHIPPED | OUTPATIENT
Start: 2023-08-10

## 2023-08-11 NOTE — TELEPHONE ENCOUNTER
From: Last Peguero  To: Williams Richards. Cody Zavala MD  Sent: 8/9/2023 9:38 PM CDT  Subject: Percy Pizano    Dear Dr. Cody Zavala,    I would also like for you to clear out the wax in my ears at my appointment on August 10.     Thank you

## 2023-08-17 ENCOUNTER — OFFICE VISIT (OUTPATIENT)
Dept: PHYSICAL THERAPY | Age: 73
End: 2023-08-17
Attending: INTERNAL MEDICINE
Payer: MEDICARE

## 2023-08-17 PROCEDURE — 97140 MANUAL THERAPY 1/> REGIONS: CPT | Performed by: PHYSICAL THERAPIST

## 2023-08-17 PROCEDURE — 97110 THERAPEUTIC EXERCISES: CPT | Performed by: PHYSICAL THERAPIST

## 2023-08-17 NOTE — PROGRESS NOTES
[place]  Pt has attended 9 visits in Physical Therapy. Diagnosis:    Right shoulder strain (R69.099I)   Referring Provider: Zay Houser Date of Evaluation:    6/29/2023    Precautions:   DM 2 Next MD visit:   8/2023  Date of Surgery: n/a   Insurance Primary/Secondary: MEDICARE / Anais Lara     # Auth Visits:  POC every 10 visits       Subjective:  Feel R shoulder c/o with horiz abd exercises at times. Was able to practice bowling without R UE c/o. Had f/u with Dr. Lexi Garcia; all okay, f/u with him as needed. 80-90% better since started PT. Today can be last PT session. 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 Pain: 0/10 at rest    Objective:     + increased tone/c/o L upper trap/suboccipitals    Gross restriction active cervical spine AROM    AROM: (* denotes performed with pain)  Shoulder    Flexion: R 160 deg, L 165 deg  Abduction: R/L 160   IR: R ~ T9, L T7  ER R/L each C7      L/R shoulder mechanics with reaching WNL      Strength/MMT: (* denotes performed with pain)  Shoulder   Flexion: R 4+/5; L 5-/5  Abduction: R 4/5*; L 4/5*  ER: R 55 L 5/5  IR: R 5/5; L 5/5     + R Empty Can  - R Speed's test    Assessment:   Pt has progressed well with PT, demonstrating subjective, objective and quick dash score improvements. Pt appropriate to continue with PT HEP due to progress. Pt with some c/o neck pain that may be muscular, related to work and neck ROM deficits. Have reviewed considerations for c/o. Goals:   (to be met in 8-10 visits)   Patient to report independence with PT HEP to facilitate self management and to maintain progress made in PT. - MET  Patient to have at least 150 deg active R shoulder abd to facilitate overhead ADL (haircare, reaching) and work. - MET  Patient to have active R shoulder IR to at least T9 to facilitate don/doff clothing, bathing, self care.  - MET  Patient to have R shoulder flex, abd, ER MMT at least 4/+/5 for min c/o with reaching. - PARTIALLY MET  Patient to report at least 50% improvement in R shoulder for increased ease with ADL. - MET    Plan: Discharge patient from PT to HEP as well tolerated due to progress, goals met, independence with PT HEP, patient preference. Pt to f/u with physician for any unresolved c/o/concerns re: shoulder/neck. Date: 7/17/2023                TX#: 5/8-10 Date: 7/20/2023  Tx#: 6/8-10 Date: 7/31/2023  Tx #: 7/8-10 Date: 8/9/2023  Tx # 8/8-10 Date: 8/17/2023  Tx #: 9/10   There Ex:   UBE:  1 min forward, 1 min backward, level 1 (2 min total)  Seated thoracic spine ext/pec stretch 5 x 5 sec each  B low row using using blue tubing x 20 reps  Standing B shoulder ER using YTB x 10 reps, supine x 10 reps   Standing B shoulder horiz abd using YTB, supine x 10 reps   S/L shoulder abduction: R 0# x 5 reps, 1# x 12 reps, 2#  x 3  reps - stopped; L 0# x 5 reps, 1# x 12 reps  Standing R shoulder IR using  blue TB x 15 reps   R shoulder hand behind back IR stretch using green strap x 5 reps  Wall circles using towel x 7 reps each clockwise/counterclockwise       There Ex:   UBE: 1.5 min forward/1.5 min backward (3 min total), level 1  Seated thoracic spine ext/pec stretch 5 x 5 sec each  B low row using using blue tubing x 20 reps  Supine B shoulder ER using YTB x 10 reps - pt c/o, stopped  Standing/supine B shoulder horiz abd using YTB, supine x 10 reps each  S/L R shoulder ER 0# x 10 reps, 1# x 10 reps  S/L shoulder abduction: R 0# x 5 reps, 1# x 12 reps, 2#  x 3  reps - stopped; L 0# x 5 reps, 1# x 12 reps  Standing R shoulder IR using  blue TB- not completed, insufficient time  R shoulder hand behind back IR stretch using green strap - pt fatigued, not complted  Clock wall reaches x 4 reps each 9,10,12,1, 3 o'clock  HEP review; see below There Ex:   HEP review/practice; see below.    B shoulder ER using YTB 2 x 10 reps  B shoudler horiz abd using YTB 2 x 10 reps  Diagonal pull aparts using YTB 2 x 10 reps   Hand behind back stretch using strap 5 x 5 sec each L/R UE (pt requested to complete L UE as well) There Ex:   HEP review, including exercise priorities to facilitate compliance  Seated thoracic spine ext/pec - shoulder ER stretch  5 x 10 sec each   UBE: 1.5 min forward/1.5 min (3 min total), level 1   B shoulder ER using YTB  x 10 reps- pt declined 2nd set, reported fatigue  B shoudler horiz abd using YTB  x 10 reps- pt declined 2nd set, reported fatigue  Diagonal pull aparts using YTB  x 10 reps - pt declined 2nd set, reported fatigue  L/R hand behind back IR AAROM using strap 5 x 5 sec each  Standing L/R shoulder abduction wall slide x 10 reps each   There Ex:   HEP review/practice; see below.    B shoulder ER using YTB x 10 reps - may use mirror or towels for cuing for form, as needed  Standing and supine B shoulder horiz abd using YTB x 10 reps  Posterior L/R GH capsule stretch 2 x 20 sec each  Inferior glide R shoulder 2 x 15 sec each using 10# weight (pt requested 10#, suggested 5#) - pt belén well          Manual:   B pec stretch 3 x 20 sec each   L/R GH posterior and inferior glides grade II -III  Manual:   B pec stretch 3 x 20 sec each   L/R GH posterior and inferior glides grade II -III  Manual:   B pec stretch 3 x 15 sec each  Myofascial release L > R upper traps  Suboccipital release 3 x 15 sec each Manual:   B pec stretch 3 x 15 sec each  Myofascial release L > R upper traps  Suboccipital release 3 x 15 sec each  R GH posterior and inferior glides grade II-III  R shoulder PROM WNL - not completed Manual:   B pec stretch 3 x 15 sec each  Myofascial release L > R upper traps  Suboccipital release 3 x 15 sec each  R GH posterior and inferior glides grade II-III    There Activities:  Re: c/o L sided neck pain, advised pt to consider: pillow support for neutral cervical spine for sleeping, MHP 10-15 reps, postural awareness There Activities:   Briefly reviewed ergonomics for  piano tuning  Pillow support for sleeping There Activities:   Pt education/considerations for neck c/o: MHP, STM, stretches/breaks if has to look downward for longer period of time to tune piano - cervical spine ext, seated cervical/thoracic spine ext/pec stretch, posture at wall, pec stretch in door frame There activities:  Consider general neck AROM, possibly using MHP          HEP: standing R shoulder ER using yellow band*, wall slides - flex/abd*, standing row using GTB (progress to blue TB), B shoulder horiz abd. * = priorities if short on time    Charges: 2 TE (25 min), 1 manual (10 min), 0 TA (5 min)     Total Timed Treatment: 40 min  Total Treatment Time: 45 min  QuickDASH Outcome Score  Score: 29.55 % (6/29/2023  3:32 PM)    Post QuickDASH Outcome Score  Post Score: 11.36 % (8/17/2023 10:16 AM)    18.19 % improvement    Plan: Discharge patient from PT to HEP as well tolerated due to progress, goals met, independence with PT HEP, patient preference. Pt to f/u with physician for any unresolved c/o/concerns re: shoulder/neck. Patient/Family/Caregiver was advised of these findings, precautions, and treatment options and has agreed to actively participate in planning and for this course of care. Thank you for your referral. If you have any questions, please contact me at Dept: [phone]. Sincerely,  Electronically signed by therapist: Darwin Mccurdy, PT     Physician's certification required:  Yes  Please co-sign or sign and return this letter via fax as soon as possible to [phone]. I certify the need for these services furnished under this plan of treatment and while under my care.     X___________________________________________________ Date____________________    Certification From: 1/22/9264  To:11/15/2023

## 2023-08-21 ENCOUNTER — PATIENT MESSAGE (OUTPATIENT)
Dept: ENDOCRINOLOGY CLINIC | Facility: CLINIC | Age: 73
End: 2023-08-21

## 2023-08-21 RX ORDER — METFORMIN HYDROCHLORIDE 500 MG/1
1000 TABLET, EXTENDED RELEASE ORAL 2 TIMES DAILY WITH MEALS
Qty: 360 TABLET | Refills: 0 | Status: SHIPPED | OUTPATIENT
Start: 2023-08-21

## 2023-08-21 NOTE — TELEPHONE ENCOUNTER
From: Savannah Fields  To: Oumar Bradley MD  Sent: 2023 10:28 AM CDT  Subject: Metformin    Good morning, Dr. Alyce Whitman. I need to request a new prescription for Metformin ER 500mg. I am currently taking 4 per day; 1.5 with breakfast, 1 with lunch, and 1.5 with dinner. I have about 5 days of Metformin left. Thank you.     Viviane Garcia  : [id number]

## 2023-08-24 ENCOUNTER — APPOINTMENT (OUTPATIENT)
Dept: PHYSICAL THERAPY | Age: 73
End: 2023-08-24
Attending: INTERNAL MEDICINE
Payer: MEDICARE

## 2023-09-01 RX ORDER — ATENOLOL 50 MG/1
50 TABLET ORAL 2 TIMES DAILY
Qty: 180 TABLET | Refills: 1 | Status: SHIPPED | OUTPATIENT
Start: 2023-09-01

## 2023-09-15 ENCOUNTER — HOSPITAL ENCOUNTER (OUTPATIENT)
Dept: ULTRASOUND IMAGING | Facility: HOSPITAL | Age: 73
Discharge: HOME | End: 2023-09-15
Attending: INTERNAL MEDICINE
Payer: MEDICARE

## 2023-09-15 DIAGNOSIS — I65.29 CAROTID ATHEROSCLEROSIS, UNSPECIFIED LATERALITY: ICD-10-CM

## 2023-09-15 PROCEDURE — 93880 EXTRACRANIAL BILAT STUDY: CPT | Performed by: INTERNAL MEDICINE

## 2023-09-19 ENCOUNTER — TELEPHONE (OUTPATIENT)
Dept: SURGERY | Facility: CLINIC | Age: 73
End: 2023-09-19

## 2023-09-22 NOTE — TELEPHONE ENCOUNTER
Spoke with patient, assisted in rescheduling procedure. PT confirmed and verbalized understanding.      Future Appointments   Date Time Provider Anjali Bella   10/11/2023 10:00 AM Aurea Butts MD [facility]   10/13/2023  9:30 AM Anna Chopra MD [facility]   10/17/2023 11:15 AM Trinidad Foote MD [address] [place] [address]   10/20/2023 10:40 AM Tj Garcia MD [facility] [place]   11/1/2023  8:00 AM Zeina Vazquez MD [facility] Jeniffer Oliver

## 2023-09-29 ENCOUNTER — LAB ENCOUNTER (OUTPATIENT)
Dept: LAB | Facility: HOSPITAL | Age: 73
End: 2023-09-29
Attending: INTERNAL MEDICINE
Payer: MEDICARE

## 2023-09-29 DIAGNOSIS — Z12.5 SCREENING FOR PROSTATE CANCER: ICD-10-CM

## 2023-09-29 DIAGNOSIS — E11.9 TYPE 2 DIABETES MELLITUS WITHOUT COMPLICATION, WITHOUT LONG-TERM CURRENT USE OF INSULIN (HCC): ICD-10-CM

## 2023-09-29 LAB
ALBUMIN SERPL-MCNC: 3.6 G/DL (ref 3.4–5)
ALBUMIN/GLOB SERPL: 1 {RATIO} (ref 1–2)
ALP LIVER SERPL-CCNC: 34 U/L
ALT SERPL-CCNC: 49 U/L
ANION GAP SERPL CALC-SCNC: 9 MMOL/L (ref 0–18)
AST SERPL-CCNC: 17 U/L (ref 15–37)
BASOPHILS # BLD AUTO: 0.05 X10(3) UL (ref 0–0.2)
BASOPHILS NFR BLD AUTO: 0.6 %
BILIRUB SERPL-MCNC: 0.6 MG/DL (ref 0.1–2)
BUN BLD-MCNC: 23 MG/DL (ref 7–18)
BUN/CREAT SERPL: 22.8 (ref 10–20)
CALCIUM BLD-MCNC: 9.5 MG/DL (ref 8.5–10.1)
CHLORIDE SERPL-SCNC: 103 MMOL/L (ref 98–112)
CHOLEST SERPL-MCNC: 189 MG/DL (ref ?–200)
CO2 SERPL-SCNC: 23 MMOL/L (ref 21–32)
COMPLEXED PSA SERPL-MCNC: 4.42 NG/ML (ref ?–4)
CREAT BLD-MCNC: 1.01 MG/DL
CREAT UR-SCNC: 15.3 MG/DL
DEPRECATED RDW RBC AUTO: 44.1 FL (ref 35.1–46.3)
EGFRCR SERPLBLD CKD-EPI 2021: 78 ML/MIN/1.73M2 (ref 60–?)
EOSINOPHIL # BLD AUTO: 0.43 X10(3) UL (ref 0–0.7)
EOSINOPHIL NFR BLD AUTO: 5 %
ERYTHROCYTE [DISTWIDTH] IN BLOOD BY AUTOMATED COUNT: 13.1 % (ref 11–15)
FASTING PATIENT LIPID ANSWER: YES
FASTING STATUS PATIENT QL REPORTED: YES
GLOBULIN PLAS-MCNC: 3.6 G/DL (ref 2.8–4.4)
GLUCOSE BLD-MCNC: 158 MG/DL (ref 70–99)
HCT VFR BLD AUTO: 45 %
HDLC SERPL-MCNC: 33 MG/DL (ref 40–59)
HGB BLD-MCNC: 15.4 G/DL
IMM GRANULOCYTES # BLD AUTO: 0.04 X10(3) UL (ref 0–1)
IMM GRANULOCYTES NFR BLD: 0.5 %
LDLC SERPL CALC-MCNC: 83 MG/DL (ref ?–100)
LYMPHOCYTES # BLD AUTO: 2.68 X10(3) UL (ref 1–4)
LYMPHOCYTES NFR BLD AUTO: 31.3 %
MCH RBC QN AUTO: 31.6 PG (ref 26–34)
MCHC RBC AUTO-ENTMCNC: 34.2 G/DL (ref 31–37)
MCV RBC AUTO: 92.2 FL
MICROALBUMIN UR-MCNC: 1.37 MG/DL
MICROALBUMIN/CREAT 24H UR-RTO: 89.5 UG/MG (ref ?–30)
MONOCYTES # BLD AUTO: 0.82 X10(3) UL (ref 0.1–1)
MONOCYTES NFR BLD AUTO: 9.6 %
NEUTROPHILS # BLD AUTO: 4.55 X10 (3) UL (ref 1.5–7.7)
NEUTROPHILS # BLD AUTO: 4.55 X10(3) UL (ref 1.5–7.7)
NEUTROPHILS NFR BLD AUTO: 53 %
NONHDLC SERPL-MCNC: 156 MG/DL (ref ?–130)
OSMOLALITY SERPL CALC.SUM OF ELEC: 287 MOSM/KG (ref 275–295)
PLATELET # BLD AUTO: 232 10(3)UL (ref 150–450)
POTASSIUM SERPL-SCNC: 3.9 MMOL/L (ref 3.5–5.1)
PROT SERPL-MCNC: 7.2 G/DL (ref 6.4–8.2)
RBC # BLD AUTO: 4.88 X10(6)UL
SODIUM SERPL-SCNC: 135 MMOL/L (ref 136–145)
TRIGL SERPL-MCNC: 454 MG/DL (ref 30–149)
TSI SER-ACNC: 2.47 MIU/ML (ref 0.36–3.74)
VLDLC SERPL CALC-MCNC: 73 MG/DL (ref 0–30)
WBC # BLD AUTO: 8.6 X10(3) UL (ref 4–11)

## 2023-09-29 PROCEDURE — 82043 UR ALBUMIN QUANTITATIVE: CPT

## 2023-09-29 PROCEDURE — 36415 COLL VENOUS BLD VENIPUNCTURE: CPT

## 2023-09-29 PROCEDURE — 84443 ASSAY THYROID STIM HORMONE: CPT

## 2023-09-29 PROCEDURE — 85025 COMPLETE CBC W/AUTO DIFF WBC: CPT

## 2023-09-29 PROCEDURE — 80061 LIPID PANEL: CPT

## 2023-09-29 PROCEDURE — 80053 COMPREHEN METABOLIC PANEL: CPT

## 2023-09-29 PROCEDURE — 82570 ASSAY OF URINE CREATININE: CPT

## 2023-10-11 ENCOUNTER — PROCEDURE (OUTPATIENT)
Dept: SURGERY | Facility: CLINIC | Age: 73
End: 2023-10-11

## 2023-10-11 VITALS — HEART RATE: 60 BPM | DIASTOLIC BLOOD PRESSURE: 61 MMHG | SYSTOLIC BLOOD PRESSURE: 136 MMHG

## 2023-10-11 DIAGNOSIS — Z12.5 SCREENING PSA (PROSTATE SPECIFIC ANTIGEN): ICD-10-CM

## 2023-10-11 DIAGNOSIS — R97.20 ELEVATED PSA: ICD-10-CM

## 2023-10-11 DIAGNOSIS — N40.1 BENIGN PROSTATIC HYPERPLASIA WITH WEAK URINARY STREAM: ICD-10-CM

## 2023-10-11 DIAGNOSIS — C67.2 MALIGNANT NEOPLASM OF LATERAL WALL OF URINARY BLADDER (HCC): Primary | ICD-10-CM

## 2023-10-11 DIAGNOSIS — R39.12 BENIGN PROSTATIC HYPERPLASIA WITH WEAK URINARY STREAM: ICD-10-CM

## 2023-10-11 PROCEDURE — 52000 CYSTOURETHROSCOPY: CPT | Performed by: UROLOGY

## 2023-10-11 PROCEDURE — 99213 OFFICE O/P EST LOW 20 MIN: CPT | Performed by: UROLOGY

## 2023-10-11 RX ORDER — CIPROFLOXACIN 500 MG/1
500 TABLET, FILM COATED ORAL ONCE
Status: COMPLETED | OUTPATIENT
Start: 2023-10-11 | End: 2023-10-11

## 2023-10-11 RX ADMIN — CIPROFLOXACIN 500 MG: 500 TABLET, FILM COATED ORAL at 11:31:00

## 2023-10-11 NOTE — PROGRESS NOTES
[facility] Urology  Follow-Up Visit    HPI: Alethea Machado is a 74year old male presents for a follow up visit. Patient was last seen on 3/28/22 by Dr. Pamela Christian. INTERVAL HISTORY: Patient presents to establish urological care following Dr. Viki Green [facility]. He denies gross hematuria or dysuria. No bone pain or unintentional weight loss. Urine cytology 8/2022 was benign. Stable mild LUTS from BPH. Not on any medical therapy at this point. Screening PSA level 9/2023 was 4.42 ng/mL, stable compared to the past 2 years. 1. Low Risk NMIBC (Ta LG)  2. Prostate cancer screening. 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 No intravesical therapy. Has been undergoing bladder cancer surveillance without evidence of disease recurrence. Cystoscopy 3/2022 SLY. No family history of prostate cancer. PSA 8/16/22 was 4.47 ng/mL with 23% free PSA. This is stable when compared to 8/2021 where PSA was 4.42 ng/mL. - 9/2022 F/U: Urine cytology from 8/2022 was benign. BTS cystoscopy revealing no evidence of disease recurrence. - 10/2023 F/U: BTS cystoscopy without evidence of disease recurrence. PSA mildly elevated yet stable for the past 2 years. Reviewed past medical, surgical, family, and social history. Reviewed med list and allergies. REVIEW OF SYSTEMS:  Pertinent positives and negatives per HPI. A 10-point ROS was performed and is otherwise negative. EXAM:  /61 (BP Location: Left arm, Patient Position: Sitting, Cuff Size: adult)   Pulse 60      Physical Exam  Constitutional:       Appearance: He is well-developed. HENT:      Head: Normocephalic. Eyes:      General: No scleral icterus. Cardiovascular:      Rate and Rhythm: Normal rate.    Pulmonary:      Effort: Pulmonary effort is normal.   Genitourinary:     Penis: Uncircumcised. Phimosis present. Skin:     General: Skin is warm and dry. Neurological:      Mental Status: He is alert and oriented to person, place, and time. Psychiatric:         Mood and Affect: Mood normal.         Behavior: Behavior normal.       PATHOLOGY:  See HPI for details. LABS:    Component PSA, % Free PSA   Latest Ref Rng & Units % <=4.00 ng/mL   9/29/2023  4.42 (H)   8/16/2022 23 4.47 (H)   8/20/2021  4.42 (H)   8/11/2020  3.88   4/24/2019  3.07   2/6/2018  3.6   1/14/2017  2.7       IMAGING:  No results found. UROLOGY PROCEDURE:    BTS CYSTOURETHROSCOPY  Informed consent was obtained for the procedure. The site was prepped and draped in the usual sterile fashion. An Olympus 16 [place] citizen flexible cystoscope was utilized for the procedure. Anesthesia:  2% lidocaine gel. Urethra: Normal without strictures or masses. Prostate / Pelvic: Abnormal 1-2 + enlarged with lateral lobe hypertrophy and some median lobe enlargement. .    Bladder: Normal.  No tumor, stone, diverticulum, or glomerulation. TURBT scar noted. U.O's: Normal bilaterally. Trabeculation: +1. POST CYSTOSCOPY MEDICATIONS: sample one tablet Cipro 500 mg given to patient. DIAGNOSIS: No evidence of bladder cancer recurrence. Mild to moderate BPH. Justino Guajardo IMPRESSION:  74year old male with history of low risk nonmuscle invasive urothelial bladder cancer diagnosed 6/2019 upon TURBT. No evidence of disease recurrence upon surveillance cystoscopies. Cytology benign. BTS cystoscopy today without evidence of disease recurrence. Findings reviewed with patient. Recommend annual cystoscopy for another 1 year per guidelines. PSA levels mildly elevated however acceptable for patient's age. Levels have been relatively stable over the past 2 years. Recommend continued routine prostate cancer screening with annual PSA until the age of 74. Patient agreeable. He has BPH with mild LUTS.   Currently not on any medical therapy. Not bothered by his symptoms. He wishes to continue watchful waiting for the time being. All questions answered. PLAN:  1.  Schedule for BTS cystoscopy in 1 year. 2.  Continue routine prostate cancer screening until the age of 74. Next PSA 8/2024. Follow-up in 1 year for cystoscopy, PSA prior to office visit.       Genevive Leventhal, MD  10/11/2023

## 2023-10-12 RX ORDER — METFORMIN HYDROCHLORIDE 500 MG/1
1000 TABLET, EXTENDED RELEASE ORAL 2 TIMES DAILY WITH MEALS
Qty: 360 TABLET | Refills: 0 | Status: SHIPPED | OUTPATIENT
Start: 2023-10-12

## 2023-10-12 RX ORDER — LISINOPRIL 40 MG/1
40 TABLET ORAL DAILY
Qty: 90 TABLET | Refills: 0 | Status: SHIPPED | OUTPATIENT
Start: 2023-10-12

## 2023-10-12 RX ORDER — LEVOTHYROXINE SODIUM 0.03 MG/1
25 TABLET ORAL
Qty: 90 TABLET | Refills: 1 | Status: SHIPPED | OUTPATIENT
Start: 2023-10-12

## 2023-10-13 ENCOUNTER — OFFICE VISIT (OUTPATIENT)
Dept: ENDOCRINOLOGY CLINIC | Facility: CLINIC | Age: 73
End: 2023-10-13

## 2023-10-13 VITALS
WEIGHT: 191 LBS | SYSTOLIC BLOOD PRESSURE: 127 MMHG | DIASTOLIC BLOOD PRESSURE: 57 MMHG | HEIGHT: 68 IN | BODY MASS INDEX: 28.95 KG/M2 | HEART RATE: 57 BPM

## 2023-10-13 DIAGNOSIS — E11.65 TYPE 2 DIABETES MELLITUS WITH HYPERGLYCEMIA, UNSPECIFIED WHETHER LONG TERM INSULIN USE (HCC): Primary | ICD-10-CM

## 2023-10-13 DIAGNOSIS — E78.5 DYSLIPIDEMIA: ICD-10-CM

## 2023-10-13 DIAGNOSIS — E03.8 SUBCLINICAL HYPOTHYROIDISM: ICD-10-CM

## 2023-10-13 LAB
CARTRIDGE LOT#: ABNORMAL NUMERIC
GLUCOSE BLOOD: 141
HEMOGLOBIN A1C: 7 % (ref 4.3–5.6)
TEST STRIP LOT #: NORMAL NUMERIC

## 2023-10-13 PROCEDURE — 83036 HEMOGLOBIN GLYCOSYLATED A1C: CPT | Performed by: INTERNAL MEDICINE

## 2023-10-13 PROCEDURE — 82947 ASSAY GLUCOSE BLOOD QUANT: CPT | Performed by: INTERNAL MEDICINE

## 2023-10-13 PROCEDURE — 99214 OFFICE O/P EST MOD 30 MIN: CPT | Performed by: INTERNAL MEDICINE

## 2023-10-13 RX ORDER — FENOFIBRATE 134 MG/1
134 CAPSULE ORAL DAILY
Qty: 90 CAPSULE | Refills: 0 | Status: SHIPPED | OUTPATIENT
Start: 2023-10-13

## 2023-10-13 NOTE — PROGRESS NOTES
Return Office Visit     CHIEF COMPLAINT:    DM    Dyslipidemia  Subclinical Hypothyroidism      HISTORY OF PRESENT ILLNESS:  Jennefer Curling is a 74year old male who presents for follow up for DM. DM HISTORY:  Diagnosed in 2000       HISTORY OF DIABETES COMPLICATIONS: :  History of Retinopathy: No - last eye exam:  June 2023  History of Neuropathy: No  History of Nephropathy: No    ASSOCIATED COMPLICATIONS:    HTN: Yes  Hyperlipidemia: Yes  Coronary Artery Disease:  No  Cerebrovascular Disease: No      HOME GLUCOSE READINGS:   Fasting 140-170    CURRENT DIABETIC MEDICATIONS INCLUDE:    MTF ER 1000 mg BID  Januvia 100 mg daily    MEALS:  Three meals a day  Dietary compliance is moderate      EXERCISE:  no      CURRENT MEDICATION:    Current Outpatient Medications   Medication Sig Dispense Refill    Fenofibrate 134 MG Oral Cap Take 134 mg by mouth daily. 90 capsule 0    metFORMIN  MG Oral Tablet 24 Hr Take 2 tablets (1,000 mg total) by mouth 2 (two) times daily with meals. 360 tablet 0    levothyroxine 25 MCG Oral Tab Take 1 tablet (25 mcg total) by mouth before breakfast. 90 tablet 1    lisinopril 40 MG Oral Tab Take 1 tablet (40 mg total) by mouth daily. 90 tablet 0    atenolol 50 MG Oral Tab Take 1 tablet (50 mg total) by mouth 2 (two) times daily. 180 tablet 1    montelukast 10 MG Oral Tab Take 1 tablet (10 mg total) by mouth nightly. 30 tablet 3    azelastine 0.1 % Nasal Solution 2 sprays by Nasal route 2 (two) times daily. 30 mL 3    Glucose Blood (FREESTYLE LITE TEST) In Vitro Strip Use test strips to check blood sugar 3 times per day as directed. 300 strip 1    FreeStyle Lancets Does not apply Misc Use lancets to check blood sugar 3 times per day as directed. 300 each 1    hydrALAZINE 10 MG Oral Tab Take 1 tablet (10 mg total) by mouth 3 (three) times daily. 270 tablet 3    SITagliptin Phosphate (JANUVIA) 100 MG Oral Tab Take 1 tablet (100 mg total) by mouth daily.  90 tablet 1    Icosapent Ethyl (VASCEPA) 1 g Oral Cap Take 1 capsule by mouth 2 (two) times daily. 180 capsule 3    amLODIPine 5 MG Oral Tab Take 1 tablet (5 mg total) by mouth 2 (two) times daily. 180 tablet 3    meclizine 25 MG Oral Tab Take 1 tablet (25 mg total) by mouth 3 (three) times daily as needed. 15 tablet 0    latanoprost 0.005 % Ophthalmic Solution INSTILL 1 DROP INTO BOTH EYES EVERY EVEING 3 each 3    pravastatin 40 MG Oral Tab Take 1 tablet (40 mg total) by mouth nightly. 90 tablet 1    ketoconazole 2 % External Cream Apply to feet 3 times weekly 60 g 11         ALLERGY:    Tetanus Immune Glob*    UNKNOWN    Comment:Unsure of reaction from childhood             Allergy to horse hair/serum. PAST MEDICAL, SOCIAL AND FAMILY HISTORY:  See past medical history marked as reviewed. See past surgical history marked as reviewed. See past family history marked as reviewed. See past social history marked as reviewed. ASSESSMENTS:     REVIEW OF SYSTEMS:  Constitutional: Negative for: weight change, fever, fatigue, cold/heat intolerance  Eyes: Negative for:  Visual changes, proptosis, blurring  ENT: Negative for:  dysphagia, neck swelling, dysphonia  Respiratory: Negative for:  dyspnea, cough  Cardiovascular: Negative for:  chest pain, palpitations, orthopnea  GI: Negative for:  abdominal pain, nausea, vomiting, diarrhea, constipation, bleeding  Neurology: Negative for: headache, numbness, weakness  Genito-Urinary: Negative for: dysuria, frequency  Psychiatric: Negative for:  depression, anxiety  Hematology/Lymphatics: Negative for: bruising, lower extremity edema  Endocrine: Negative for: polyuria, polydypsia  Skin: Negative for: rash, blister, cellulitis,       PHYSICAL EXAM:   Vitals reviewed    General Appearance:  alert, well developed, in no acute distress  Head: Atraumatic  Eyes:  normal conjunctivae, sclera. , normal sclera and normal pupils  Throat/Neck: normal sound to voice.  Normal hearing, normal speech  Respiratory: Speaking in full sentences, non-labored. no increased work of breathing, no audible wheezing    Neuro: motor grossly intact, moving all extremities without difficulty  Psychiatric:  oriented to time, self, and place  Extremities:   Done on LOV  Bilateral barefoot skin diabetic exam is normal, visualized feet and the appearance is normal.  Bilateral monofilament/sensation of both feet is normal.  Pulsation pedal pulse exam of both lower legs/feet is normal as well. DATA:         A1c 7.6  % ( 6/2023) --> 7 % ( 10/2023)  Reviewed labs    ASSESSMENT AND PLAN:    1. Type 2 DM:      Plan:  Discussed the pathogenesis, natural course of diabetes. Patient understands the importance of glycemic control and the implications of uncontrolled diabetes including Diabetic ketoacidosis and various micro vascular and macrovascular complications. a). Medications:    A1c is better   He notes some higher sugars in the last 2 weeks, after cortisone injection to the back  These have come down now  He will like to cut back on MTF since The 4 tablets a day cause some GI SE    Since sugars are better now, will decrease MTF  He will check sugars beore bF  and before dinner  Update in 1-2 weeks  If BG go significantly higher, we will add  Amaryl    C/w MTF ER 1000 mg BID with meals--> TID with meals  Take with food  C/w Januvia 100 mg daily  SE reviewed  Monitor and send BG as discussed      SGLT2 inhibitors and actos: will avoid given h/o bladder cancer    Reviewed exercise  Continue to work on low carb diet        b). Last ur MA elevated, will monitor. Good Bg and BP control  c). Instructed on importance of annual eye exams. d). Foot exam: Daily feet exam explained  e). BG log maintainence explained in great detail, to get log and glucometer on next visit. f). Life style changes discussed  g). Hypoglycemia recognition and management discussed    2. Dyslipidemia  A) Discussed lifestyle modifications including reductions in dietary total and saturated fat, weight loss, aerobic exercise, and eating a diet rich in fruits and vegetables. B) c/w simvastatin. C) Fasting lipid panel reviewed  LDL cholesterol is okay   However, his TG level is elevated   Discussed that when these are over 500, they can increase risk of pancreatitis  We could start fenofibrate and monitor labs in addition to a low fat diet and daily exercise  Fenofibrate 134 mg daily   Fasting lipid panel in two months  3. BP is slightly high. Low salt diet   4. Subclinical Hypothyroidism  On LT4 25 mcg daily  Reports compliance and takes it empty stomach      RTC in 4 months.

## 2023-10-17 ENCOUNTER — OFFICE VISIT (OUTPATIENT)
Dept: OPHTHALMOLOGY | Facility: CLINIC | Age: 73
End: 2023-10-17
Payer: MEDICARE

## 2023-10-17 DIAGNOSIS — H40.1131 PRIMARY OPEN ANGLE GLAUCOMA OF BOTH EYES, MILD STAGE: Primary | ICD-10-CM

## 2023-10-17 PROCEDURE — 1126F AMNT PAIN NOTED NONE PRSNT: CPT | Performed by: OPHTHALMOLOGY

## 2023-10-17 PROCEDURE — 99213 OFFICE O/P EST LOW 20 MIN: CPT | Performed by: OPHTHALMOLOGY

## 2023-10-17 RX ORDER — GLIMEPIRIDE 1 MG/1
1 TABLET ORAL
COMMUNITY

## 2023-10-17 NOTE — PROGRESS NOTES
"  SUBJECTIVE:   Shelley Mccrary is a 68 year old female who presents for Preventive Visit.  click delete button to remove this line now  click delete button to remove this line now  Are you in the first 12 months of your Medicare Part B coverage?  No    Physical Health:    In general, how would you rate your overall physical health? good    Outside of work, how many days during the week do you exercise? 6-7 days/week    Outside of work, approximately how many minutes a day do you exercise?15-30 minutes    If you drink alcohol do you typically have >3 drinks per day or >7 drinks per week? No    Do you usually eat at least 4 servings of fruit and vegetables a day, include whole grains & fiber and avoid regularly eating high fat or \"junk\" foods? Yes    Do you have any problems taking medications regularly?  No    Do you have any side effects from medications? none    Needs assistance for the following daily activities: no assistance needed    Which of the following safety concerns are present in your home?  none identified     Hearing impairment: Yes,     In the past 6 months, have you been bothered by leaking of urine? no    Mental Health:    In general, how would you rate your overall mental or emotional health? excellent  PHQ-2 Score:      Do you feel safe in your environment? Yes    Do you have a Health Care Directive? Yes: Advance Directive has been received and scanned.    Additional concerns to address?  No    Fall risk:  Fallen 2 or more times in the past year?: No  Any fall with injury in the past year?: No  click delete button to remove this line now  Cognitive Screenin) Repeat 3 items (Leader, Season, Table)    2) Clock draw: NORMAL  3) 3 item recall: Recalls 3 objects  Results: 3 items recalled: COGNITIVE IMPAIRMENT LESS LIKELY    Mini-CogTM Copyright KAVON Iniguez. Licensed by the author for use in [employer]; reprinted with permission (tonio@.[facility]). All rights reserved.      Do you have " Estelita Mayen is a 74year old male. HPI:     HPI    Pt in today for an IOP check. Per pt is taking Latanoprost in both eyes at bedtime as directed. Pt states vision is stable. Last edited by Eamon Montanez OT on 10/17/2023 11:28 AM.        Patient History:  Past Medical History:   Diagnosis Date    Asthma     BCC (basal cell carcinoma)     right medial cheek    Bell's palsy     Calculus of kidney     Cancer [facility]) May, 2019    Bladder cancer. Patient of Dr. Trenton Song    Diabetes [facility])     Essential hypertension     Glaucoma ~20 1st visit w/ [facility], patient was on Latanoprost qhs OU since around  per Dr. Bre Duarte     High blood pressure     High cholesterol     Hyperlipidemia     Kidney stone     Pyloric stenosis     [place] Hunt syndrome (geniculate herpes zoster) 2016    Rx with prednisone    Screen for colon cancer     repeat CLN in 5-7 years    Skin cancer     left shoulder        Surgical History: Estelita Mayen has a past surgical history that includes tonsillectomy; colonoscopy; colonoscopy () (Polyps removed, I have a colonoscopy every 5 years.); and colonoscopy (N/A, 2021) (Procedure: COLONOSCOPY;  Surgeon: Shanta West MD;  [facility]: [facility]).     Family History   Problem Relation Age of Onset    Other (Other) Father          of stroke    Other (Other) Mother         Alzheimer's Disease    Diabetes Sister     Glaucoma Neg     Macular degeneration Neg        Social History:   Social History     Socioeconomic History    Marital status:    Tobacco Use    Smoking status: Never     Passive exposure: Never    Smokeless tobacco: Never    Tobacco comments:     Second-hand smoke from smoking parents and college roommate   Vaping Use    Vaping Use: Never used   Substance and Sexual Activity    Alcohol use: Yes     Comment: 1-2 drinks per month    Drug use: Never    Sexual activity: Not Currently     Partners: Female   Other Topics Concern    Reaction to local anesthetic No    Pt has a pacemaker No    Pt has a defibrillator No       Medications:  Current Outpatient Medications   Medication Sig Dispense Refill    glimepiride 1 MG Oral Tab Take 1 tablet (1 mg total) by mouth daily with breakfast.      Fenofibrate 134 MG Oral Cap Take 134 mg by mouth daily. 90 capsule 0    metFORMIN  MG Oral Tablet 24 Hr Take 2 tablets (1,000 mg total) by mouth 2 (two) times daily with meals. 360 tablet 0    levothyroxine 25 MCG Oral Tab Take 1 tablet (25 mcg total) by mouth before breakfast. 90 tablet 1    lisinopril 40 MG Oral Tab Take 1 tablet (40 mg total) by mouth daily. 90 tablet 0    atenolol 50 MG Oral Tab Take 1 tablet (50 mg total) by mouth 2 (two) times daily. 180 tablet 1    montelukast 10 MG Oral Tab Take 1 tablet (10 mg total) by mouth nightly. 30 tablet 3    azelastine 0.1 % Nasal Solution 2 sprays by Nasal route 2 (two) times daily. 30 mL 3    Glucose Blood (FREESTYLE LITE TEST) In Vitro Strip Use test strips to check blood sugar 3 times per day as directed. 300 strip 1    FreeStyle Lancets Does not apply Misc Use lancets to check blood sugar 3 times per day as directed. 300 each 1    hydrALAZINE 10 MG Oral Tab Take 1 tablet (10 mg total) by mouth 3 (three) times daily. 270 tablet 3    SITagliptin Phosphate (JANUVIA) 100 MG Oral Tab Take 1 tablet (100 mg total) by mouth daily. 90 tablet 1    Icosapent Ethyl (VASCEPA) 1 g Oral Cap Take 1 capsule by mouth 2 (two) times daily. 180 capsule 3    amLODIPine 5 MG Oral Tab Take 1 tablet (5 mg total) by mouth 2 (two) times daily. 180 tablet 3    meclizine 25 MG Oral Tab Take 1 tablet (25 mg total) by mouth 3 (three) times daily as needed. 15 tablet 0    latanoprost 0.005 % Ophthalmic Solution INSTILL 1 DROP INTO BOTH EYES EVERY EVEING 3 each 3    pravastatin 40 MG Oral Tab Take 1 tablet (40 mg total) by mouth nightly.  90 tablet 1    ketoconazole 2 % External Cream Apply to feet 3 times weekly sleep apnea, excessive snoring or daytime drowsiness?: no        PROBLEMS TO ADD ON...   rt ear is feeling plugged last few days and she was trying to clean and felt irritated ,so worried . No other uri sx           Med check  Follow-up      Need refill on med's. Also due for labs     Depression and Anxiety Follow-Up    Status since last visit: doing well on current med's. Had tried going down last year, but had to go back on it. She wants to continue and need refill     Other associated symptoms:See phq-9 and debi 7     Complicating factors:     Significant life event: No     Current substance abuse: None    PHQ 2018   PHQ-9 Total Score 0 0 0   Q9: Suicide Ideation Not at all Not at all Not at all     DEBI-7 SCORE 2018   Total Score - - -   Total Score 1 (minimal anxiety) - -   Total Score - 0 3       PHQ-9  English  PHQ-9   Any Language  DEBI-7  Suicide Assessment Five-step Evaluation and Treatment (SAFE-T)    Reviewed and updated as needed this visit by clinical staff         Reviewed and updated as needed this visit by Provider        Social History     Tobacco Use     Smoking status: Former Smoker     Types: Cigarettes     Last attempt to quit: 1990     Years since quittin.9     Smokeless tobacco: Never Used     Tobacco comment: smoked 1-2 pack week, 30 years   Substance Use Topics     Alcohol use: No     Alcohol/week: 0.0 oz                           Current providers sharing in care for this patient include:   Patient Care Team:  Fabiola Mahmood MD as PCP - General (Family Practice)    The following health maintenance items are reviewed in Epic and correct as of today:  Health Maintenance   Topic Date Due     HF ACTION PLAN Q3 YR  1950     ZOSTER IMMUNIZATION (1 of 2) 1998     CBC Q1 YR  2017     COLON CANCER SCREEN (SYSTEM ASSIGNED)  11/15/2017     BMP Q6 MOS  2018     DEBI QUESTIONNAIRE 6 MONTHS  2018     INFLUENZA  60 g 11       Allergies:    Tetanus Immune Glob*    UNKNOWN    Comment:Unsure of reaction from childhood             Allergy to horse hair/serum. ROS:       PHYSICAL EXAM:     Base Eye Exam       Visual Acuity (Snellen - Linear)         Right Left    Dist cc 20/20 -1 20/30    Dist ph cc  20/25 -3      Correction: Glasses              Tonometry (Applanation, 11:17 AM)         Right Left    Pressure 14 14              Pachymetry (1/13/2021)         Right Left    Thickness 548/0 540/0              Pupils         Pupils    Right PERRL    Left PERRL              Neuro/Psych       Oriented x3: Yes                  Slit Lamp and Fundus Exam       Slit Lamp Exam         Right Left    Lids/Lashes Dermatochalasis, Meibomian gland dysfunction, 1+ Scurf Dermatochalasis, Meibomian gland dysfunction, 1+ Scurf    Conjunctiva/Sclera Temp pinguecula, 1+ Injection Temp pinguecula, 1+ Injection    Cornea no Krukenberg's spindle no Krukenberg's spindle    Anterior Chamber Deep and quiet Deep and quiet    Iris No transillumination defects No transillumination defects              Fundus Exam         Right Left    Disc Sloping margin, Peripapillary atrophy Sloping margin, Peripapillary atrophy    C/D Ratio 0.8 0.9                  Refraction       Wearing Rx         Sphere Cylinder Axis Add    Right -1.00 +0.75 170 +2.50    Left -1.50 +1.25 175 +2.50      Type: Progressive bifocal                     ASSESSMENT/PLAN:     Diagnoses and Plan:     Primary open angle glaucoma of both eyes, mild stage  IOP is stable. Continue Latanoprost; one drop in both eyes at bedtime.     Will have patient back in 4 months for visual field, OCT and diabetic exam    Orders Placed This Encounter      OCT, Optic Nerve - OU - Both Eyes      Sanchez Visual Field - OU - Both Eyes      Meds This Visit:  Requested Prescriptions      No prescriptions requested or ordered in this encounter        Follow up instructions:  Return in about 4 months (around VACCINE (1) 2018     LIPID MONITORING Q1 YEAR  10/10/2018     CMP Q1 YR  2018     MAMMO SCREEN Q2 YR (SYSTEM ASSIGNED)  2018     FALL RISK ASSESSMENT  2018     PHQ-9 Q6 MONTHS  2019     ADVANCE DIRECTIVE PLANNING Q5 YRS  2020     DTAP/TDAP/TD IMMUNIZATION (4 - Td) 2028     DEPRESSION ACTION PLAN  Completed     PNEUMOVAX IMMUNIZATION 65+ HIGH/HIGHEST RISK  Completed     HEPATITIS C SCREENING  Completed     DEXA SCAN SCREENING (SYSTEM ASSIGNED)  Addressed     IPV IMMUNIZATION  Aged Out     MENINGITIS IMMUNIZATION  Aged Out     Patient Active Problem List   Diagnosis     CAD (coronary artery disease)     Hyperlipidemia LDL goal <70     Disturbance of skin sensation     Elevated LFTs     Adjustment disorder with mixed anxiety and depressed mood     Myocardial infarction (H)     Ischemic cardiomyopathy     Palpitations     Acute myocardial infarction of other inferior wall, initial episode of care     Anxiety     Coronary artery disease involving native coronary artery of native heart without angina pectoris     Tinnitus, bilateral     Gastroesophageal reflux disease, esophagitis presence not specified     Cardiomyopathy, unspecified (H)     Past Surgical History:   Procedure Laterality Date     ANGIOPLASTY  2015    MAURISIO to mid circ, thrombectomy -MAURISIO to proximal, mid, distal RCA , successful balloon to 1st RPL done Resolute stents used procedure done at [facility]     INNER EAR SURGERY      [facility]ign [facility] of miners diseas        Social History     Tobacco Use     Smoking status: Former Smoker     Types: Cigarettes     Last attempt to quit: 1990     Years since quittin.9     Smokeless tobacco: Never Used     Tobacco comment: smoked 1-2 pack week, 30 years   Substance Use Topics     Alcohol use: No     Alcohol/week: 0.0 oz     Family History   Problem Relation Age of Onset     Coronary Artery Disease Father         at age 39, and other heart problem       "Hyperlipidemia Father         ?      Hypertension Mother      Diabetes No family hx of      Cerebrovascular Disease No family hx of      Breast Cancer No family hx of      Colon Cancer No family hx of          Pneumonia Vaccine:Adults age 65+ who received Pneumovax (PPSV23) at 65 years or older: Should be given PCV13 > 1 year after their most recent PPSV23  Mammogram Screening: Mammogram Screening: Patient over age 50, mutual decision to screen reflected in health maintenance.    ROS:  CONSTITUTIONAL: NEGATIVE for fever, chills, change in weight  INTEGUMENTARY/SKIN: NEGATIVE for worrisome rashes, moles or lesions  EYES: NEGATIVE for vision changes or irritation  ENT/MOUTH: NEGATIVE for ear, mouth and throat problems and except rt ear is feeling plugged last few days and she was trying to clean and felt irritated so worried   RESP: NEGATIVE for significant cough or SOB  BREAST: NEGATIVE for masses, tenderness or discharge  CV: NEGATIVE for chest pain, palpitations or peripheral edema  GI: NEGATIVE for nausea, abdominal pain, heartburn, or change in bowel habits  : NEGATIVE for frequency, dysuria, or hematuria  MUSCULOSKELETAL: NEGATIVE for significant arthralgias or myalgia  NEURO: NEGATIVE for weakness, dizziness or paresthesias  ENDOCRINE: NEGATIVE for temperature intolerance, skin/hair changes  HEME: NEGATIVE for bleeding problems  PSYCHIATRIC: NEGATIVE for changes in mood or affect    OBJECTIVE:   There were no vitals taken for this visit. Estimated body mass index is 25.17 kg/m  as calculated from the following:    Height as of 9/12/18: 1.575 m (5' 2\").    Weight as of 9/12/18: 62.4 kg (137 lb 9.6 oz).  EXAM:   GENERAL: healthy, alert and no distress  EYES: Eyes grossly normal to inspection, PERRL and conjunctivae and sclerae normal  HENT: both ear canals occluded with cerumen. mouth without ulcers or lesions  HENT: nose and mouth without ulcers or lesions, oropharynx clear and oral mucous membranes " 2/17/2024) for Visual Field, OCT, Diabetic eye exam.    10/17/2023  Scribed by: Annelise Stanley MD moist  NECK: no adenopathy, no asymmetry, masses, or scars and thyroid normal to palpation  RESP: lungs clear to auscultation - no rales, rhonchi or wheezes  BREAST: normal without masses, tenderness or nipple discharge and no palpable axillary masses or adenopathy  CV: regular rate and rhythm, normal S1 S2, no S3 or S4, no murmur, click or rub, no peripheral edema   ABDOMEN: soft, nontender, no hepatosplenomegaly, no masses and bowel sounds normal   (female): deferred  RECTAL: deferred  MS: no gross musculoskeletal defects noted, no edema  SKIN: no suspicious lesions or rashes  NEURO: Normal strength and tone, mentation intact and speech normal  PSYCH: mentation appears normal, affect normal/bright        ASSESSMENT / PLAN:   (Z00.00) Routine history and physical examination of adult  (primary encounter diagnosis)  Comment:   Plan:     (Z23) Need for prophylactic vaccination and inoculation against influenza  Comment:   Plan: FLU VACCINE, INCREASED ANTIGEN, PRESV FREE, AGE        65+ [75067], Vaccine Administration, Initial         [29932]            (F43.23) Adjustment disorder with mixed anxiety and depressed mood  Comment: doing well. Wants to continue and need refill  Plan: sertraline (ZOLOFT) 25 MG tablet            (E78.5) Hyperlipidemia LDL goal <70  Comment:   Plan: Lipid panel reflex to direct LDL Fasting,         COMPREHENSIVE METABOLIC PANEL, atorvastatin         (LIPITOR) 40 MG tablet            (Z12.11) Screening for colon cancer  Comment: she is declining colonoscopy   Plan: Fecal colorectal cancer screen FIT            (I42.8) Other cardiomyopathy (H)  Comment: stable , seeing cardiology. Due for labs   Plan: COMPREHENSIVE METABOLIC PANEL, CBC with         platelets, metoprolol succinate ER (TOPROL-XL)         25 MG 24 hr tablet            (H61.23) Bilateral impacted cerumen  Comment:   Plan: REMOVE IMPACTED CERUMEN          Cerumenosis is noted.  Wax is removed by syringing and manual debridement.  "Discussed  home care to prevent wax buildup are given.      End of Life Planning:  Patient currently has an advanced directive: Yes.  Practitioner is supportive of decision.    COUNSELING:  Reviewed preventive health counseling, as reflected in patient instructions  Special attention given to:       Regular exercise       Healthy diet/nutrition       Vision screening       Dental care       Immunizations    Vaccinated for: Influenza and  Should also consider Zoster. She will check with insurance and return if needed              Osteoporosis Prevention/Bone Health    BP Readings from Last 1 Encounters:   09/12/18 125/82     Estimated body mass index is 25.17 kg/m  as calculated from the following:    Height as of 9/12/18: 1.575 m (5' 2\").    Weight as of 9/12/18: 62.4 kg (137 lb 9.6 oz).           reports that she quit smoking about 28 years ago. Her smoking use included cigarettes. she has never used smokeless tobacco.      Appropriate preventive services were discussed with this patient, including applicable screening as appropriate for cardiovascular disease, diabetes, osteopenia/osteoporosis, and glaucoma.  As appropriate for age/gender, discussed screening for colorectal cancer, prostate cancer, breast cancer, and cervical cancer. Checklist reviewing preventive services available has been given to the patient.    Reviewed patients plan of care and provided an AVS. The Basic Care Plan (routine screening as documented in Health Maintenance) for Shelley meets the Care Plan requirement. This Care Plan has been established and reviewed with the Patient.    Counseling Resources:  ATP IV Guidelines  Pooled Cohorts Equation Calculator  Breast Cancer Risk Calculator  FRAX Risk Assessment  ICSI Preventive Guidelines  Dietary Guidelines for Americans, 2010  USDA's MyPlate  ASA Prophylaxis  Lung CA Screening    Fabiola Mahmood MD  [facility] [place]  Injectable Influenza Immunization Documentation    1.  Is " the person to be vaccinated sick today?   No    2. Does the person to be vaccinated have an allergy to a component   of the vaccine?   No  Egg Allergy Algorithm Link    3. Has the person to be vaccinated ever had a serious reaction   to influenza vaccine in the past?   No    4. Has the person to be vaccinated ever had Guillain-Barré syndrome?   No    Form completed by af

## 2023-10-17 NOTE — PATIENT INSTRUCTIONS
Primary open angle glaucoma of both eyes, mild stage  IOP is stable. Continue Latanoprost; one drop in both eyes at bedtime.     Will have patient back in 4 months for visual field, OCT and diabetic exam

## 2023-10-17 NOTE — ASSESSMENT & PLAN NOTE
IOP is stable. Continue Latanoprost; one drop in both eyes at bedtime.     Will have patient back in 4 months for visual field, OCT and diabetic exam

## 2023-10-20 ENCOUNTER — OFFICE VISIT (OUTPATIENT)
Dept: INTERNAL MEDICINE CLINIC | Facility: CLINIC | Age: 73
End: 2023-10-20

## 2023-10-20 VITALS
BODY MASS INDEX: 28.95 KG/M2 | HEIGHT: 68 IN | DIASTOLIC BLOOD PRESSURE: 60 MMHG | RESPIRATION RATE: 20 BRPM | TEMPERATURE: 97 F | WEIGHT: 191 LBS | HEART RATE: 70 BPM | SYSTOLIC BLOOD PRESSURE: 126 MMHG

## 2023-10-20 DIAGNOSIS — J01.90 ACUTE NON-RECURRENT SINUSITIS, UNSPECIFIED LOCATION: ICD-10-CM

## 2023-10-20 DIAGNOSIS — E78.5 HYPERLIPIDEMIA, UNSPECIFIED HYPERLIPIDEMIA TYPE: ICD-10-CM

## 2023-10-20 DIAGNOSIS — I10 ESSENTIAL HYPERTENSION: Primary | ICD-10-CM

## 2023-10-20 DIAGNOSIS — Z23 NEED FOR VACCINATION: ICD-10-CM

## 2023-10-20 DIAGNOSIS — G31.84 MCI (MILD COGNITIVE IMPAIRMENT): ICD-10-CM

## 2023-10-20 DIAGNOSIS — I65.29 CAROTID ATHEROSCLEROSIS, UNSPECIFIED LATERALITY: ICD-10-CM

## 2023-10-20 DIAGNOSIS — E11.9 TYPE 2 DIABETES MELLITUS WITHOUT COMPLICATION, WITHOUT LONG-TERM CURRENT USE OF INSULIN (HCC): ICD-10-CM

## 2023-10-20 PROCEDURE — 99214 OFFICE O/P EST MOD 30 MIN: CPT | Performed by: INTERNAL MEDICINE

## 2023-10-20 PROCEDURE — 90662 IIV NO PRSV INCREASED AG IM: CPT | Performed by: INTERNAL MEDICINE

## 2023-10-20 PROCEDURE — G0008 ADMIN INFLUENZA VIRUS VAC: HCPCS | Performed by: INTERNAL MEDICINE

## 2023-10-20 PROCEDURE — 1126F AMNT PAIN NOTED NONE PRSNT: CPT | Performed by: INTERNAL MEDICINE

## 2023-10-20 RX ORDER — AMOXICILLIN 875 MG/1
875 TABLET, COATED ORAL 2 TIMES DAILY
Qty: 14 TABLET | Refills: 0 | Status: SHIPPED | OUTPATIENT
Start: 2023-10-20 | End: 2023-10-27

## 2023-11-01 ENCOUNTER — OFFICE VISIT (OUTPATIENT)
Dept: DERMATOLOGY CLINIC | Facility: CLINIC | Age: 73
End: 2023-11-01
Payer: MEDICARE

## 2023-11-01 DIAGNOSIS — L57.0 ACTINIC KERATOSIS: ICD-10-CM

## 2023-11-01 DIAGNOSIS — L81.4 LENTIGINES: ICD-10-CM

## 2023-11-01 DIAGNOSIS — L82.1 SEBORRHEIC KERATOSES: ICD-10-CM

## 2023-11-01 DIAGNOSIS — D22.9 MULTIPLE BENIGN NEVI: Primary | ICD-10-CM

## 2023-11-01 DIAGNOSIS — D18.01 CHERRY ANGIOMA: ICD-10-CM

## 2023-11-01 PROCEDURE — 99213 OFFICE O/P EST LOW 20 MIN: CPT | Performed by: STUDENT IN AN ORGANIZED HEALTH CARE EDUCATION/TRAINING PROGRAM

## 2023-11-01 PROCEDURE — 17000 DESTRUCT PREMALG LESION: CPT | Performed by: STUDENT IN AN ORGANIZED HEALTH CARE EDUCATION/TRAINING PROGRAM

## 2023-11-01 NOTE — PROGRESS NOTES
November 1, 2023    Established patient     CHIEF COMPLAINT: FBSE    HISTORY OF PRESENT ILLNESS: .    - No particular lesions of concern. DERM HISTORY:  History of skin cancer: Yes ([address])    FAMILY HISTORY:  History of melanoma: No    PAST MEDICAL HISTORY:  Past Medical History:   Diagnosis Date    Asthma     BCC (basal cell carcinoma) 2021    right medial cheek    Bell's palsy     Calculus of kidney     Cancer ([address] May, 2019    Bladder cancer. Patient of Dr. Robby Soto    Diabetes [facility])     Essential hypertension     Glaucoma ~2015 12/31/20 1st visit w/ GISELLEM, patient was on Latanoprost qhs OU since around 2015 per Dr. Brad Pichardo     High blood pressure     High cholesterol     Hyperlipidemia     Kidney stone     Pyloric stenosis     [place] Hunt syndrome (geniculate herpes zoster) 2016    Rx with prednisone    Screen for colon cancer 2021    repeat CLN in 5-7 years    Skin cancer 2021    left shoulder        REVIEW OF SYSTEMS:  Constitutional: Denies fever, chills, unintentional weight loss. Skin as per HPI    Medications  Current Outpatient Medications   Medication Sig Dispense Refill    glimepiride 1 MG Oral Tab Take 1 tablet (1 mg total) by mouth daily with breakfast.      Fenofibrate 134 MG Oral Cap Take 134 mg by mouth daily. 90 capsule 0    metFORMIN  MG Oral Tablet 24 Hr Take 2 tablets (1,000 mg total) by mouth 2 (two) times daily with meals. 360 tablet 0    levothyroxine 25 MCG Oral Tab Take 1 tablet (25 mcg total) by mouth before breakfast. 90 tablet 1    lisinopril 40 MG Oral Tab Take 1 tablet (40 mg total) by mouth daily. 90 tablet 0    atenolol 50 MG Oral Tab Take 1 tablet (50 mg total) by mouth 2 (two) times daily. 180 tablet 1    montelukast 10 MG Oral Tab Take 1 tablet (10 mg total) by mouth nightly. 30 tablet 3    azelastine 0.1 % Nasal Solution 2 sprays by Nasal route 2 (two) times daily.  30 mL 3    Glucose Blood (FREESTYLE LITE TEST) In Vitro Strip Use test strips to check blood sugar 3 times per day as directed. 300 strip 1    FreeStyle Lancets Does not apply Misc Use lancets to check blood sugar 3 times per day as directed. 300 each 1    hydrALAZINE 10 MG Oral Tab Take 1 tablet (10 mg total) by mouth 3 (three) times daily. 270 tablet 3    SITagliptin Phosphate (JANUVIA) 100 MG Oral Tab Take 1 tablet (100 mg total) by mouth daily. 90 tablet 1    Icosapent Ethyl (VASCEPA) 1 g Oral Cap Take 1 capsule by mouth 2 (two) times daily. 180 capsule 3    amLODIPine 5 MG Oral Tab Take 1 tablet (5 mg total) by mouth 2 (two) times daily. 180 tablet 3    meclizine 25 MG Oral Tab Take 1 tablet (25 mg total) by mouth 3 (three) times daily as needed. 15 tablet 0    latanoprost 0.005 % Ophthalmic Solution INSTILL 1 DROP INTO BOTH EYES EVERY EVEING 3 each 3    pravastatin 40 MG Oral Tab Take 1 tablet (40 mg total) by mouth nightly. 90 tablet 1    ketoconazole 2 % External Cream Apply to feet 3 times weekly 60 g 11       PHYSICAL EXAM:  General: awake, alert, no acute distress  Skin: Skin exam was performed today including the following: head and face, scalp, neck, chest (including breasts and axillae), abdomen, back, bilateral upper extremities, bilateral lower extremities, hands, feet, digits, nails. Pertinent findings include:   - Scattered bright red-purple dome-shaped papules on the trunk and extremities   - Scattered light brown stellate macules on sun exposed sites  - Scattered, evenly colored, round brown macules and papules with regular borders on the trunk and extremities  - Numerous scattered skin-colored and brown, waxy, stuck-on papules and plaques on the trunk and extremities  - Scalp with a pink gritty papule    ASSESSMENT & PLAN:  Pathophysiology of diagnoses discussed with patient. Therapeutic options reviewed. Risks, benefits, and alternatives discussed with patient. Instructions reviewed at length.     #Lentigines  #Seborrheic keratoses   #Cherry angiomas   - Reassurance provided regarding the benign nature of these lesions. #Multiple benign nevi  - Complete skin exam performed today with no outlier lesions identified   - Reassured patient of benign nature of these lesions.   - Recommend daily photoprotection with broad-spectrum sunscreen, avoidance of sun during peak hours, and sun protective clothing.    - Dermoscopy was used for physical examination of pigmented lesions during today's office visit. #Actinic Keratosis  - Discussed premalignant etiology and possibility of transformation to [facility]  - Recommended cryotherapy today   - Discussed side effects including redness, swelling, crusting, and discolortion after treatment, wound care with soap/water and vaseline     - Procedure Note Cryosurgery of pre-malignant lesion(s)  Risks, benefits, alternatives, complications, and personnel required for cryosurgery reviewed with patient. Patient verbalizes understanding and wishes to proceed. - Cryosurgery performed with Liquid Nitrogen via cryostat spray gun to Actinic Keratosis . 1 lesion(s) treated. - Patient tolerated well and wound care discussed. Return if lesions fail to fully resolve.      Return to clinic: 1 year  or sooner if something concerning arises     Deanne Og MD

## 2023-11-06 ENCOUNTER — PATIENT MESSAGE (OUTPATIENT)
Dept: ENDOCRINOLOGY CLINIC | Facility: CLINIC | Age: 73
End: 2023-11-06

## 2023-11-06 NOTE — TELEPHONE ENCOUNTER
From: Caro Malhotra  To: Corrine Manriquez  Sent: 11/6/2023 12:58 PM CST  Subject: Bassam Evans. It appears that [facility] in [place] never received the prescription for glimiperide. I see it on my medication list, but they have never contacted me to come pick it up. Would you please reissue the prescription to them? Thank you!   Es Ambrose  bod: 11/20/1950

## 2023-11-07 ENCOUNTER — OFFICE VISIT (OUTPATIENT)
Dept: OTOLARYNGOLOGY | Facility: CLINIC | Age: 73
End: 2023-11-07

## 2023-11-07 VITALS — WEIGHT: 192 LBS | HEIGHT: 68 IN | BODY MASS INDEX: 29.1 KG/M2

## 2023-11-07 DIAGNOSIS — R09.82 POSTNASAL DISCHARGE: Primary | ICD-10-CM

## 2023-11-07 PROCEDURE — 99213 OFFICE O/P EST LOW 20 MIN: CPT | Performed by: OTOLARYNGOLOGY

## 2023-11-30 RX ORDER — SITAGLIPTIN 100 MG/1
100 TABLET, FILM COATED ORAL DAILY
Qty: 90 TABLET | Refills: 3 | Status: SHIPPED | OUTPATIENT
Start: 2023-11-30

## 2023-12-18 ENCOUNTER — OFFICE VISIT (OUTPATIENT)
Dept: DERMATOLOGY CLINIC | Facility: CLINIC | Age: 73
End: 2023-12-18

## 2023-12-18 DIAGNOSIS — D18.01 CHERRY ANGIOMA: ICD-10-CM

## 2023-12-18 DIAGNOSIS — L81.4 LENTIGINES: Primary | ICD-10-CM

## 2023-12-18 DIAGNOSIS — D48.5 NEOPLASM OF UNCERTAIN BEHAVIOR OF SKIN: ICD-10-CM

## 2023-12-18 PROCEDURE — 88305 TISSUE EXAM BY PATHOLOGIST: CPT | Performed by: STUDENT IN AN ORGANIZED HEALTH CARE EDUCATION/TRAINING PROGRAM

## 2023-12-20 ENCOUNTER — OFFICE VISIT (OUTPATIENT)
Dept: INTERNAL MEDICINE CLINIC | Facility: CLINIC | Age: 73
End: 2023-12-20

## 2023-12-20 VITALS
WEIGHT: 188 LBS | SYSTOLIC BLOOD PRESSURE: 126 MMHG | HEART RATE: 64 BPM | RESPIRATION RATE: 18 BRPM | HEIGHT: 68 IN | DIASTOLIC BLOOD PRESSURE: 60 MMHG | BODY MASS INDEX: 28.49 KG/M2 | TEMPERATURE: 97 F

## 2023-12-20 DIAGNOSIS — E78.5 HYPERLIPIDEMIA, UNSPECIFIED HYPERLIPIDEMIA TYPE: ICD-10-CM

## 2023-12-20 DIAGNOSIS — C67.9 MALIGNANT NEOPLASM OF URINARY BLADDER, UNSPECIFIED SITE (HCC): ICD-10-CM

## 2023-12-20 DIAGNOSIS — I10 ESSENTIAL HYPERTENSION: ICD-10-CM

## 2023-12-20 DIAGNOSIS — E11.69 TYPE 2 DIABETES MELLITUS WITH OTHER SPECIFIED COMPLICATION, UNSPECIFIED WHETHER LONG TERM INSULIN USE (HCC): ICD-10-CM

## 2023-12-20 DIAGNOSIS — G31.84 MCI (MILD COGNITIVE IMPAIRMENT): ICD-10-CM

## 2023-12-20 DIAGNOSIS — I65.29 CAROTID ATHEROSCLEROSIS, UNSPECIFIED LATERALITY: ICD-10-CM

## 2023-12-20 DIAGNOSIS — Z00.00 ENCOUNTER FOR ANNUAL HEALTH EXAMINATION: Primary | ICD-10-CM

## 2023-12-20 PROCEDURE — 1126F AMNT PAIN NOTED NONE PRSNT: CPT | Performed by: INTERNAL MEDICINE

## 2023-12-20 PROCEDURE — G0439 PPPS, SUBSEQ VISIT: HCPCS | Performed by: INTERNAL MEDICINE

## 2024-01-05 ENCOUNTER — IMMUNIZATION (OUTPATIENT)
Dept: LAB | Age: 74
End: 2024-01-05
Attending: EMERGENCY MEDICINE
Payer: MEDICARE

## 2024-01-05 DIAGNOSIS — Z23 NEED FOR VACCINATION: Primary | ICD-10-CM

## 2024-01-05 PROCEDURE — 90480 ADMN SARSCOV2 VAC 1/ONLY CMP: CPT

## 2024-01-09 RX ORDER — FENOFIBRATE 134 MG/1
134 CAPSULE ORAL DAILY
Qty: 90 CAPSULE | Refills: 0 | Status: SHIPPED | OUTPATIENT
Start: 2024-01-09

## 2024-01-09 NOTE — TELEPHONE ENCOUNTER
lisinopril 40 MG Oral Tab, Take 1 tablet (40 mg total) by mouth daily., Disp: 90 tablet, Rfl: 0

## 2024-01-10 DIAGNOSIS — I10 ESSENTIAL HYPERTENSION: ICD-10-CM

## 2024-01-10 RX ORDER — LISINOPRIL 40 MG/1
40 TABLET ORAL DAILY
Qty: 90 TABLET | Refills: 0 | Status: SHIPPED | OUTPATIENT
Start: 2024-01-10

## 2024-01-11 RX ORDER — AMLODIPINE BESYLATE 5 MG/1
5 TABLET ORAL 2 TIMES DAILY
Qty: 180 TABLET | Refills: 3 | Status: SHIPPED | OUTPATIENT
Start: 2024-01-11

## 2024-01-11 RX ORDER — ATENOLOL 50 MG/1
50 TABLET ORAL 2 TIMES DAILY
Qty: 180 TABLET | Refills: 3 | Status: SHIPPED | OUTPATIENT
Start: 2024-01-11

## 2024-01-11 NOTE — TELEPHONE ENCOUNTER
Refill passed per [facility] protocol.  Requested Prescriptions   Pending Prescriptions Disp Refills    AMLODIPINE 5 MG Oral Tab [Pharmacy Med Name: AMLODIPINE BESYLATE 5MG TABLETS] 180 tablet 3     Sig: TAKE 1 TABLET(5 MG) BY MOUTH TWICE DAILY       Hypertensive Medications Protocol Passed - 1/10/2024  6:00 AM        Passed - In person appointment in the past 12 or next 3 months     Recent Outpatient Visits              3 weeks ago Encounter for annual health examination    [facility] Floyd Coelho MD    Office Visit    3 weeks ago [facility]    2 months ago Postnasal discharge    [facility] Murtaza Guajardo MD    Office Visit    2 months ago Multiple benign nevi    [facility] Alex Zuluaga MD    Office Visit    2 months ago Essential hypertension    [facility] Floyd Coelho MD    Office Visit          Future Appointments         Provider Department Appt Notes    In 4 weeks Rochelle Baker MD [facility] 4 months    In 5 months Floyd Coelho MD [facility] 6 MTH FOLLOW UP    In 6 months Ricki Laureano MD [facility] EP/ VF, OCT and DM EE    In 9 months David Kennedy MD [facility] 8am cysto BTS-km    In 10 months Alex Zuluaga MD [facility] full body               Passed - Last BP reading less than 140/90     BP Readings from Last 1 Encounters:   12/20/23 126/60               Passed - CMP or BMP in past 6 months     Recent Results (from the past 4392 hour(s))   Comp Metabolic Panel (14)    Collection  Time: 09/29/23  9:20 AM   Result Value Ref Range    Glucose 158 (H) 70 - 99 mg/dL    Sodium 135 (L) 136 - 145 mmol/L    Potassium 3.9 3.5 - 5.1 mmol/L    Chloride 103 98 - 112 mmol/L    CO2 23.0 21.0 - 32.0 mmol/L    Anion Gap 9 0 - 18 mmol/L    BUN 23 (H) 7 - 18 mg/dL    Creatinine 1.01 0.70 - 1.30 mg/dL    BUN/CREA Ratio 22.8 (H) 10.0 - 20.0    Calcium, Total 9.5 8.5 - 10.1 mg/dL    Calculated Osmolality 287 275 - 295 mOsm/kg    eGFR-Cr 78 >=60 mL/min/1.73m2    ALT 49 16 - 61 U/L    AST 17 15 - 37 U/L    Alkaline Phosphatase 34 (L) 45 - 117 U/L    Bilirubin, Total 0.6 0.1 - 2.0 mg/dL    Total Protein 7.2 6.4 - 8.2 g/dL    Albumin 3.6 3.4 - 5.0 g/dL    Globulin  3.6 2.8 - 4.4 g/dL    A/G Ratio 1.0 1.0 - 2.0    Patient Fasting for CMP? Yes      *Note: Due to a large number of results and/or encounters for the requested time period, some results have not been displayed. A complete set of results can be found in Results Review.               Passed - In person appointment or virtual visit in the past 6 months     Recent Outpatient Visits              3 weeks ago Encounter for annual health examination    [facility] Floyd Coelho MD    Office Visit    3 weeks ago Lentigines    [facility]    Office Visit    2 months ago Postnasal discharge    [facility] Murtaza Guajardo MD    Office Visit    2 months ago Multiple benign nevi    [facility] Alex Zuluaga MD    Office Visit    2 months ago Essential hypertension    [facility] Floyd Coelho MD    Office Visit          Future Appointments         Provider Department Appt Notes    In 4 weeks Rochelle Baker MD [facility] 4 months    In 5 months Floyd Coelho MD [facility] 6 MTH FOLLOW UP    In 6 months Ricki Laureano MD [facility] EP/ VF, OCT and DM EE    In 9 months David Kennedy MD [facility] 8am cysto BTS-km    In 10 months Alex Zuluaga MD [facility] full body               Passed - EGFRCR or GFRNAA > 50     GFR Evaluation  EGFRCR: 78 , resulted on 9/29/2023            ATENOLOL 50 MG Oral Tab [Pharmacy Med Name: ATENOLOL 50MG TABLETS] 180 tablet 1     Sig: TAKE 1 TABLET(50 MG) BY MOUTH TWICE DAILY       Hypertensive Medications Protocol Passed - 1/10/2024  6:00 AM        Passed - In person appointment in the past 12 or next 3 months     Recent Outpatient Visits              3 weeks ago Encounter for annual health examination    [facility] Floyd Coelho MD    Office Visit    3 weeks ago Lentigines    [facility] [address], [place] Alex Zuluaga MD    Office Visit    2 months ago Postnasal discharge    [facility] Murtaza Guajardo MD    Office Visit    2 months ago Multiple benign nevi    [facility] Alex Zuluaga MD    Office Visit    2 months ago Essential hypertension    [facility] Floyd Coelho MD    Office Visit          Future Appointments         Provider Department Appt Notes    In 4 weeks Rochelle Baker MD [facility] 4 months    In 5 months Floyd Coelho MD [facility] 6 MTH FOLLOW UP    In 6 months Ricki Laureano MD [facility] EP/ VF, OCT and DM EE    In 9 months David Kennedy MD [facility] 8am cysto  BTS-km    In 10 months Alex Zuluaga MD [facility] full body               Passed - Last BP reading less than 140/90     BP Readings from Last 1 Encounters:   12/20/23 126/60               Passed - CMP or BMP in past 6 months     Recent Results (from the past 4392 hour(s))   Comp Metabolic Panel (14)    Collection Time: 09/29/23  9:20 AM   Result Value Ref Range    Glucose 158 (H) 70 - 99 mg/dL    Sodium 135 (L) 136 - 145 mmol/L    Potassium 3.9 3.5 - 5.1 mmol/L    Chloride 103 98 - 112 mmol/L    CO2 23.0 21.0 - 32.0 mmol/L    Anion Gap 9 0 - 18 mmol/L    BUN 23 (H) 7 - 18 mg/dL    Creatinine 1.01 0.70 - 1.30 mg/dL    BUN/CREA Ratio 22.8 (H) 10.0 - 20.0    Calcium, Total 9.5 8.5 - 10.1 mg/dL    Calculated Osmolality 287 275 - 295 mOsm/kg    eGFR-Cr 78 >=60 mL/min/1.73m2    ALT 49 16 - 61 U/L    AST 17 15 - 37 U/L    Alkaline Phosphatase 34 (L) 45 - 117 U/L    Bilirubin, Total 0.6 0.1 - 2.0 mg/dL    Total Protein 7.2 6.4 - 8.2 g/dL    Albumin 3.6 3.4 - 5.0 g/dL    Globulin  3.6 2.8 - 4.4 g/dL    A/G Ratio 1.0 1.0 - 2.0    Patient Fasting for CMP? Yes      *Note: Due to a large number of results and/or encounters for the requested time period, some results have not been displayed. A complete set of results can be found in Results Review.               Passed - In person appointment or virtual visit in the past 6 months     Recent Outpatient Visits              3 weeks ago Encounter for annual health examination    [facility] Floyd Coelho MD    Office Visit    3 weeks ago Lentigines    [facility] [address], [place] Alex Zuluaga MD    Office Visit    2 months ago Postnasal discharge    [facility] Murtaza Guajardo MD    Office Visit    2 months ago Multiple benign nevi    [facility] [place] Alex Zuluaga MD     Office Visit    2 months ago Essential hypertension    [facility] Floyd Coelho MD    Office Visit          Future Appointments         Provider Department Appt Notes    In 4 weeks Rochelle Baker MD [facility] 4 months    In 5 months Floyd Coelho MD [facility] 6 MTH FOLLOW UP    In 6 months Ricki Laureano MD [facility] EP/ VF, OCT and DM EE    In 9 months David Kennedy MD [facility] 8am cysto BTS-km    In 10 months Alex Zuluaga MD [facility] full body               Passed - EGFRCR or GFRNAA > 50     GFR Evaluation  EGFRCR: 78 , resulted on 9/29/2023             Recent Outpatient Visits              3 weeks ago Encounter for annual health examination    [facility] Floyd Coelho MD    Office Visit    3 weeks ago Lentigines    [facility]    Office Visit    2 months ago Postnasal discharge    [facility] Murtaza Guajardo MD    Office Visit    2 months ago Multiple benign nevi    [facility] Alex Zuluaga MD    Office Visit    2 months ago Essential hypertension    [facility] Floyd Coelho MD    Office Visit          Future Appointments         Provider Department Appt Notes    In 4 weeks Rochelle Baker MD [facility] 4 months    In 5 months Floyd Coelho MD [facility] 6 MTH FOLLOW UP    In 6 months Ricki Laureano MD [facility] EP/ VF, OCT and DM EE     In 9 months David Kennedy MD [facility] 8am cysto BTS-km    In 10 months Alex Zuluaga MD [facility] full body

## 2024-02-09 ENCOUNTER — OFFICE VISIT (OUTPATIENT)
Dept: ENDOCRINOLOGY CLINIC | Facility: CLINIC | Age: 74
End: 2024-02-09
Payer: MEDICARE

## 2024-02-09 VITALS
HEART RATE: 54 BPM | BODY MASS INDEX: 28 KG/M2 | DIASTOLIC BLOOD PRESSURE: 74 MMHG | SYSTOLIC BLOOD PRESSURE: 137 MMHG | WEIGHT: 187 LBS

## 2024-02-09 DIAGNOSIS — E03.8 SUBCLINICAL HYPOTHYROIDISM: ICD-10-CM

## 2024-02-09 DIAGNOSIS — E78.5 DYSLIPIDEMIA: ICD-10-CM

## 2024-02-09 DIAGNOSIS — E11.65 TYPE 2 DIABETES MELLITUS WITH HYPERGLYCEMIA, UNSPECIFIED WHETHER LONG TERM INSULIN USE (HCC): Primary | ICD-10-CM

## 2024-02-09 LAB
CARTRIDGE LOT#: ABNORMAL NUMERIC
GLUCOSE BLOOD: 147
HEMOGLOBIN A1C: 7.5 % (ref 4.3–5.6)
TEST STRIP LOT #: NORMAL NUMERIC

## 2024-02-09 PROCEDURE — 83036 HEMOGLOBIN GLYCOSYLATED A1C: CPT | Performed by: INTERNAL MEDICINE

## 2024-02-09 PROCEDURE — 99213 OFFICE O/P EST LOW 20 MIN: CPT | Performed by: INTERNAL MEDICINE

## 2024-02-09 PROCEDURE — 82947 ASSAY GLUCOSE BLOOD QUANT: CPT | Performed by: INTERNAL MEDICINE

## 2024-02-09 NOTE — PROGRESS NOTES
Return Office Visit     CHIEF COMPLAINT:    DM    Dyslipidemia  Subclinical Hypothyroidism      HISTORY OF PRESENT ILLNESS:  Rob Jones is a 73 year old male who presents for follow up for DM.       DM HISTORY:  Diagnosed in 2000       HISTORY OF DIABETES COMPLICATIONS: :  History of Retinopathy: No - last eye exam:  June 2023  History of Neuropathy: No  History of Nephropathy: No    ASSOCIATED COMPLICATIONS:    HTN: Yes  Hyperlipidemia: Yes  Coronary Artery Disease:  No  Cerebrovascular Disease: No      HOME GLUCOSE READINGS:   Fasting 130-180    Notes that BG go up after eating certain foods like bagels    CURRENT DIABETIC MEDICATIONS INCLUDE:    MTF  mg TID, had SE with 4 tabs daily   Januvia 100 mg daily    MEALS:  Three meals a day  Dietary compliance is moderate      EXERCISE:  no      CURRENT MEDICATION:    Current Outpatient Medications   Medication Sig Dispense Refill    amLODIPine 5 MG Oral Tab Take 1 tablet (5 mg total) by mouth 2 (two) times daily. 180 tablet 3    atenolol 50 MG Oral Tab Take 1 tablet (50 mg total) by mouth 2 (two) times daily. 180 tablet 3    lisinopril 40 MG Oral Tab Take 1 tablet (40 mg total) by mouth daily. 90 tablet 0    Fenofibrate 134 MG Oral Cap Take 134 mg by mouth daily. 90 capsule 0    JANUVIA 100 MG Oral Tab TAKE 1 TABLET DAILY 90 tablet 3    metFORMIN  MG Oral Tablet 24 Hr Take 2 tablets (1,000 mg total) by mouth 2 (two) times daily with meals. 360 tablet 0    levothyroxine 25 MCG Oral Tab Take 1 tablet (25 mcg total) by mouth before breakfast. 90 tablet 1    Glucose Blood (FREESTYLE LITE TEST) In Vitro Strip Use test strips to check blood sugar 3 times per day as directed. 300 strip 1    FreeStyle Lancets Does not apply Misc Use lancets to check blood sugar 3 times per day as directed. 300 each 1    hydrALAZINE 10 MG Oral Tab Take 1 tablet (10 mg total) by mouth 3 (three) times daily. 270 tablet 3    Icosapent Ethyl (VASCEPA) 1 g Oral Cap Take 1  capsule by mouth 2 (two) times daily. 180 capsule 3    meclizine 25 MG Oral Tab Take 1 tablet (25 mg total) by mouth 3 (three) times daily as needed. 15 tablet 0    latanoprost 0.005 % Ophthalmic Solution INSTILL 1 DROP INTO BOTH EYES EVERY EVEING 3 each 3    pravastatin 40 MG Oral Tab Take 1 tablet (40 mg total) by mouth nightly. 90 tablet 1    ketoconazole 2 % External Cream Apply to feet 3 times weekly 60 g 11    azelastine 0.1 % Nasal Solution 2 sprays by Nasal route 2 (two) times daily. 30 mL 3         ALLERGY:  Allergies   Allergen Reactions    Tetanus Immune Globulin UNKNOWN     Unsure of reaction from childhood  Allergy to horse hair/serum.       PAST MEDICAL, SOCIAL AND FAMILY HISTORY:  See past medical history marked as reviewed.  See past surgical history marked as reviewed.  See past family history marked as reviewed.  See past social history marked as reviewed.    ASSESSMENTS:     REVIEW OF SYSTEMS:  Constitutional: Negative for: weight change, fever, fatigue, cold/heat intolerance  Eyes: Negative for:  Visual changes, proptosis, blurring  ENT: Negative for:  dysphagia, neck swelling, dysphonia  Respiratory: Negative for:  dyspnea, cough  Cardiovascular: Negative for:  chest pain, palpitations, orthopnea  GI: Negative for:  abdominal pain, nausea, vomiting, diarrhea, constipation, bleeding  Neurology: Negative for: headache, numbness, weakness  Genito-Urinary: Negative for: dysuria, frequency  Psychiatric: Negative for:  depression, anxiety  Hematology/Lymphatics: Negative for: bruising, lower extremity edema  Endocrine: Negative for: polyuria, polydypsia  Skin: Negative for: rash, blister, cellulitis,       PHYSICAL EXAM:   Vitals reviewed    General Appearance:  alert, well developed, in no acute distress  Head: Atraumatic  Eyes:  normal conjunctivae, sclera., normal sclera and normal pupils  Throat/Neck: normal sound to voice. Normal hearing, normal speech  Respiratory:  Speaking in full sentences,  non-labored. no increased work of breathing, no audible wheezing    Neuro: motor grossly intact, moving all extremities without difficulty  Psychiatric:  oriented to time, self, and place  Extremities: June 2023  Bilateral barefoot skin diabetic exam is normal, visualized feet and the appearance is normal.  Bilateral monofilament/sensation of both feet is normal.  Pulsation pedal pulse exam of both lower legs/feet is normal as well.              DATA:         A1c 7.5 % ( 1/2024)  Reviewed labs    ASSESSMENT AND PLAN:    1. Type 2 DM:      Plan:  Discussed the pathogenesis, natural course of diabetes. Patient understands the importance of glycemic control and the implications of uncontrolled diabetes including Diabetic ketoacidosis and various micro vascular and macrovascular complications.      a). Medications:    We discussed option of adding amaryl at a low dose vs CPM and working on lifestyle changes likes being more active and eating lower carb meals  He will like to do that later     C/w MTF ER 1000 mg TID with meals  Take with food    C/w Januvia 100 mg daily  SE reviewed  Monitor and send BG as discussed      SGLT2 inhibitors and actos: will avoid given h/o bladder cancer    Reviewed exercise  Continue to work on low carb diet        b). Last ur MA elevated, will monitor. Good Bg and BP control  c). Instructed on importance of annual eye exams.   d). Foot exam: Daily feet exam explained  e). BG log maintainence explained in great detail, to get log and glucometer on next visit.  f). Life style changes discussed  g). Hypoglycemia recognition and management discussed    2.Dyslipidemia  A) Discussed lifestyle modifications including reductions in dietary total and saturated fat, weight loss, aerobic exercise, and eating a diet rich in fruits and vegetables.  B) c/w simvastatin.  C) Fasting lipid panel reviewed  LDL cholesterol is okay   However, his TG level is elevated   Discussed that when these are over 500,  they can increase risk of pancreatitis  C/w fenofibrate and monitor labs in addition to a low fat diet and daily exercise  Fenofibrate 134 mg daily   Fasting lipid panel   3. BP is normal  4. Subclinical Hypothyroidism  On LT4 25 mcg daily  Reports compliance and takes it empty stomach  CPM  TSH was normal on last check      RTC in 4 months.   Check and call with sugars as discussed

## 2024-02-10 NOTE — TELEPHONE ENCOUNTER
Current Outpatient Medications   Medication Sig Dispense Refill    FreeStyle Lancets Does not apply Misc Use lancets to check blood sugar 3 times per day as directed. 300 each 1     Current Outpatient Medications   Medication Sig Dispense Refill    Glucose Blood (FREESTYLE LITE TEST) In Vitro Strip Use test strips to check blood sugar 3 times per day as directed. 300 strip 1

## 2024-02-13 RX ORDER — BLOOD-GLUCOSE METER
KIT MISCELLANEOUS
Qty: 300 STRIP | Refills: 1 | Status: SHIPPED | OUTPATIENT
Start: 2024-02-13

## 2024-02-13 RX ORDER — LANCETS 28 GAUGE
EACH MISCELLANEOUS
Qty: 300 EACH | Refills: 1 | Status: SHIPPED | OUTPATIENT
Start: 2024-02-13

## 2024-02-13 NOTE — TELEPHONE ENCOUNTER
LOV;02/09/24    RTC;4months    FU;No F/U    Pending Monthly Supply;order pending, approve if appropriate

## 2024-02-19 ENCOUNTER — OFFICE VISIT (OUTPATIENT)
Dept: PODIATRY CLINIC | Facility: CLINIC | Age: 74
End: 2024-02-19
Payer: MEDICARE

## 2024-02-19 DIAGNOSIS — E11.9 TYPE 2 DIABETES MELLITUS WITHOUT COMPLICATION, WITHOUT LONG-TERM CURRENT USE OF INSULIN (HCC): Primary | ICD-10-CM

## 2024-02-19 PROCEDURE — 99213 OFFICE O/P EST LOW 20 MIN: CPT | Performed by: STUDENT IN AN ORGANIZED HEALTH CARE EDUCATION/TRAINING PROGRAM

## 2024-02-19 RX ORDER — ACETAMINOPHEN 500 MG
500 TABLET ORAL EVERY 6 HOURS PRN
COMMUNITY

## 2024-02-19 NOTE — PROGRESS NOTES
[facility] Podiatry  Progress Note      Rob Jones is a 73 year old male.   Chief Complaint   Patient presents with    Toe Pain     Consult right great toe pain 3- 5/10 swelling and hot when touching. Onset a week ago, no injury-  this AM, on 2/9/24 A1C= 7.5.             HPI:       Patient is a pleasant 73-year-old diabetic male presents to clinic for bilateral foot evaluation.  He admits to some pain on his right great toe with touch.  He admits of thickened and elongated toenails that are hard to trim.       Allergies: Tetanus immune globulin    Current Outpatient Medications   Medication Sig Dispense Refill    acetaminophen 500 MG Oral Tab Take 1 tablet (500 mg total) by mouth every 6 (six) hours as needed for Pain. Takes one at night time      Glucose Blood (FREESTYLE LITE TEST) In Vitro Strip Use test strips to check blood sugar 3 times per day as directed. 300 strip 1    FreeStyle Lancets Does not apply Misc Use lancets to check blood sugar 3 times per day as directed. 300 each 1    amLODIPine 5 MG Oral Tab Take 1 tablet (5 mg total) by mouth 2 (two) times daily. 180 tablet 3    atenolol 50 MG Oral Tab Take 1 tablet (50 mg total) by mouth 2 (two) times daily. 180 tablet 3    lisinopril 40 MG Oral Tab Take 1 tablet (40 mg total) by mouth daily. 90 tablet 0    Fenofibrate 134 MG Oral Cap Take 134 mg by mouth daily. 90 capsule 0    JANUVIA 100 MG Oral Tab TAKE 1 TABLET DAILY 90 tablet 3    metFORMIN  MG Oral Tablet 24 Hr Take 2 tablets (1,000 mg total) by mouth 2 (two) times daily with meals. 360 tablet 0    levothyroxine 25 MCG Oral Tab Take 1 tablet (25 mcg total) by mouth before breakfast. 90 tablet 1    hydrALAZINE 10 MG Oral Tab Take 1 tablet (10 mg total) by mouth 3 (three) times daily. 270 tablet 3    Icosapent Ethyl (VASCEPA) 1 g Oral Cap Take 1 capsule by mouth 2 (two) times daily. 180 capsule 3    meclizine 25 MG Oral Tab Take 1 tablet (25 mg total) by mouth 3 (three) times  daily as needed. 15 tablet 0    latanoprost 0.005 % Ophthalmic Solution INSTILL 1 DROP INTO BOTH EYES EVERY EVEING 3 each 3    pravastatin 40 MG Oral Tab Take 1 tablet (40 mg total) by mouth nightly. 90 tablet 1    ketoconazole 2 % External Cream Apply to feet 3 times weekly 60 g 11    azelastine 0.1 % Nasal Solution 2 sprays by Nasal route 2 (two) times daily. 30 mL 3      Past Medical History:   Diagnosis Date    Asthma (HCC)     BCC (basal cell carcinoma)     right medial cheek    Bell's palsy     Calculus of kidney     Cancer (HCC) May, 2019    Bladder cancer. Patient of Dr. Ryan    Colon polyp     Diabetes (HCC)     Essential hypertension     Glaucoma ~20 1st visit w/ [facility], patient was on Latanoprost qhs OU since around  per Dr. 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    COLONOSCOPY N/A 2021    Procedure: COLONOSCOPY;  Surgeon: YAHIR Marc MD;  Location: [facility] ENDOSCOPY    TONSILLECTOMY        Family History   Problem Relation Age of Onset    Other (Other) Father          of stroke    Other (Other) Mother         Alzheimer's Disease    Diabetes Sister     Glaucoma Neg     Macular degeneration Neg       Social History     Socioeconomic History    Marital status:    Tobacco Use    Smoking status: Never     Passive exposure: Never    Smokeless tobacco: Never    Tobacco comments:     Second-hand smoke from smoking parents and college roommate   Vaping Use    Vaping Use: Never used   Substance and Sexual Activity    Alcohol use: Yes     Comment: 1-2 drinks per month    Drug  use: Never    Sexual activity: Not Currently     Partners: Female   Other Topics Concern    Grew up on a farm No    History of tanning Yes    Outdoor occupation No    Reaction to local anesthetic No    Pt has a pacemaker No    Pt has a defibrillator No           REVIEW OF SYSTEMS:     Denies nause, fever, chills  No calf pain  Denies chest pain or SOB      EXAM:   There were no vitals taken for this visit.  GENERAL: well developed, well nourished, in no apparent distress  EXTREMITIES:   1. Integument: Normal skin temperature and turgor. Toenails x10 are elongated, thickened and discolored with subungal derbi.  No acute signs of infection to right great toe.    2. Vascular: Dorsalis pedis two out of four bilateral and posterior tibial pulses two out of   four bilateral, capillary refill normal.   3. Musculoskeletal: All muscle groups are graded 5 out of 5 in the foot and ankle.   4. Neurological: Normal sharp dull sensation; reflexes normal.             ASSESSMENT AND PLAN:   Diagnoses and all orders for this visit:    Type 2 diabetes mellitus without complication, without long-term current use of insulin ([facility])        Plan:       -Patient examined, chart history reviewed.  -Discussed importance of proper pedal hygiene, regular foot checks, and tight glucose control.  -Sharply debrided nails x10 with a sterile nail nipper achieving a 20% reduction in thickness and length, without incident.   -Ambulate with supportive shoes and inserts and avoid walking barefoot.  -Educated patient on acute signs of infection advised patient to seek immediate medical attention if symptoms arise.    Bilateral barefoot skin diabetic exam is normal, visualized feet and the appearance is normal.  Bilateral monofilament/sensation of both feet is normal.  Pulsation pedal pulse exam of both lower legs/feet is normal as well.        RTC in 3 weeks      The patient indicates understanding of these issues and agrees to the plan.        Mercedes  YASMINE Ordoñez

## 2024-03-01 NOTE — TELEPHONE ENCOUNTER
Tried to call patient to make an appointment for sooner than July but there was no answer. I could not leave Voicemail.

## 2024-03-02 RX ORDER — LATANOPROST 50 UG/ML
SOLUTION/ DROPS OPHTHALMIC
Qty: 3 EACH | Refills: 3 | Status: SHIPPED | OUTPATIENT
Start: 2024-03-02

## 2024-03-07 ENCOUNTER — LAB ENCOUNTER (OUTPATIENT)
Dept: LAB | Facility: HOSPITAL | Age: 74
End: 2024-03-07
Attending: INTERNAL MEDICINE
Payer: MEDICARE

## 2024-03-07 DIAGNOSIS — E78.5 DYSLIPIDEMIA: ICD-10-CM

## 2024-03-07 DIAGNOSIS — E11.65 TYPE 2 DIABETES MELLITUS WITH HYPERGLYCEMIA, UNSPECIFIED WHETHER LONG TERM INSULIN USE (HCC): ICD-10-CM

## 2024-03-07 DIAGNOSIS — E11.65 TYPE 2 DIABETES MELLITUS WITH HYPERGLYCEMIA, UNSPECIFIED WHETHER LONG TERM INSULIN USE (HCC): Primary | ICD-10-CM

## 2024-03-07 LAB
CHOLEST SERPL-MCNC: 187 MG/DL (ref ?–200)
CREAT UR-SCNC: 52.7 MG/DL
FASTING PATIENT LIPID ANSWER: YES
HDLC SERPL-MCNC: 37 MG/DL (ref 40–59)
LDLC SERPL CALC-MCNC: 107 MG/DL (ref ?–100)
MICROALBUMIN UR-MCNC: 1.7 MG/DL
MICROALBUMIN/CREAT 24H UR-RTO: 32.3 UG/MG (ref ?–30)
NONHDLC SERPL-MCNC: 150 MG/DL (ref ?–130)
TRIGL SERPL-MCNC: 249 MG/DL (ref 30–149)
VLDLC SERPL CALC-MCNC: 43 MG/DL (ref 0–30)

## 2024-03-07 PROCEDURE — 82043 UR ALBUMIN QUANTITATIVE: CPT

## 2024-03-07 PROCEDURE — 36415 COLL VENOUS BLD VENIPUNCTURE: CPT

## 2024-03-07 PROCEDURE — 80061 LIPID PANEL: CPT

## 2024-03-07 PROCEDURE — 82570 ASSAY OF URINE CREATININE: CPT

## 2024-03-08 RX ORDER — PRAVASTATIN SODIUM 40 MG
40 TABLET ORAL NIGHTLY
Qty: 90 TABLET | Refills: 1 | Status: SHIPPED | OUTPATIENT
Start: 2024-03-08

## 2024-03-08 NOTE — TELEPHONE ENCOUNTER
Spoke to patient. He indicated that he read the my chart message last night. Patient voiced understanding lab results and recommendations. Patient indicated that he only has 1 week left of his cholesterol medication.   Rx pended.

## 2024-04-09 RX ORDER — LISINOPRIL 40 MG/1
40 TABLET ORAL DAILY
Qty: 90 TABLET | Refills: 1 | Status: SHIPPED | OUTPATIENT
Start: 2024-04-09

## 2024-04-09 NOTE — ASSESSMENT & PLAN NOTE
Visual field and OCT completed in office today with stable results that were discussed with patient in office today. IOP is stable. Continue Latanoprost; one drop in both eyes at bedtime. Will have patient back in 4 months for pressure check. [Patient] : patient

## 2024-04-09 NOTE — TELEPHONE ENCOUNTER
Endocrine Refill protocol for oral antihypertensive medications    Protocol Criteria:    -Appointment with Endocrinology completed in the last 6 months or scheduled in the next 3 months    -BMP or CMP has been completed in the last 12 months     -GFR is greater than or equal to 50    Verify the above has been completed or scheduled in the appropriate timeline. If so can send a 90 day supply with 1 refill.   Verify BMP or CMP has been completed in last year  Verify last GFR result     Last completed office visit:02/09/24  Next scheduled Follow up: 06/03/24  Last BMP or CMP completion date:09/29/23  GFR result:78

## 2024-04-10 RX ORDER — LEVOTHYROXINE SODIUM 0.03 MG/1
25 TABLET ORAL
Qty: 90 TABLET | Refills: 1 | Status: SHIPPED | OUTPATIENT
Start: 2024-04-10

## 2024-04-10 RX ORDER — METFORMIN HYDROCHLORIDE 500 MG/1
1000 TABLET, EXTENDED RELEASE ORAL 2 TIMES DAILY WITH MEALS
Qty: 360 TABLET | Refills: 0 | Status: SHIPPED | OUTPATIENT
Start: 2024-04-10

## 2024-04-10 NOTE — TELEPHONE ENCOUNTER
Endocrine Refill protocol for metformin    Protocol Criteria:    -Appointment with Endocrinology completed in the last 6 months or scheduled in the next 3 months    -GFR greater than or equal to 40 in the past 12 months     -A1c result <8.5% in the past 6 months      Verify the above has been completed or scheduled in the appropriate timeline. If so can send a 90 day supply with 1 refill.     Last completed office visit:02/09/2024  Next scheduled Follow up: 06/03/2024  Last GFR result: 78    Last A1c result: 7.8

## 2024-04-10 NOTE — TELEPHONE ENCOUNTER
Endocrine refill protocol for medications for hypothyroidism and hyperthyroidism    Protocol Criteria:  Appointment with Endocrinology completed in the last 12 months or scheduled in the next  6months     Verify appointment has been completed or scheduled in the appropriate timeline. If so can send a 90 day supply with 1 refill per provider protocol.    Normal TSH result in the past 12 months   Review recent telephone encounters and mychart communications with patient to ensure a dose change has not occurred since last office visit that was not updated in the medication history list   Last completed office visit:02/09/2024  Next scheduled Follow up: 06/03/2024  Last TSH result: 2.470

## 2024-04-16 ENCOUNTER — OFFICE VISIT (OUTPATIENT)
Dept: OTOLARYNGOLOGY | Facility: CLINIC | Age: 74
End: 2024-04-16

## 2024-04-16 DIAGNOSIS — H61.23 BILATERAL IMPACTED CERUMEN: Primary | ICD-10-CM

## 2024-04-16 PROCEDURE — 69210 REMOVE IMPACTED EAR WAX UNI: CPT | Performed by: OTOLARYNGOLOGY

## 2024-04-16 NOTE — PROGRESS NOTES
Rob Jones is a 73 year old male.    Chief Complaint   Patient presents with    Ear Wax     Ear cleaning        HISTORY OF PRESENT ILLNESS        Social History     Socioeconomic History    Marital status:    Tobacco Use    Smoking status: Never     Passive exposure: Never    Smokeless tobacco: Never    Tobacco comments:     Second-hand smoke from smoking parents and college roommate   Vaping Use    Vaping status: Never Used   Substance and Sexual Activity    Alcohol use: Yes     Comment: 1-2 drinks per month    Drug use: Never    Sexual activity: Not Currently     Partners: Female   Other Topics Concern    Grew up on a farm No    History of tanning Yes    Outdoor occupation No    Reaction to local anesthetic No    Pt has a pacemaker No    Pt has a defibrillator No       Family History   Problem Relation Age of Onset    Other (Other) Father          of stroke    Other (Other) Mother         Alzheimer's Disease    Diabetes Sister     Glaucoma Neg     Macular degeneration Neg        Past Medical History:    Asthma (HCC)    BCC (basal cell carcinoma)    right medial cheek    Bell's palsy    Calculus of kidney    Cancer (HCC)    Bladder cancer. Patient of Dr. Ryan    Colon polyp    Diabetes (HCC)    Essential hypertension    Glaucoma    20 1st visit w/ RJM, patient was on Latanoprost qhs OU since around  per Dr. Guzman     High blood pressure    High cholesterol    Hyperlipidemia    Kidney stone    Pyloric stenosis (HCC)    [place] Hunt syndrome (geniculate herpes zoster)    Rx with prednisone    Screen for colon cancer    repeat CLN in 5-7 years    Skin cancer    left shoulder        Past Surgical History:   Procedure Laterality Date    Colonoscopy      Colonoscopy      Polyps removed, I have a colonoscopy every 5 years.    Colonoscopy N/A 2021    Procedure: COLONOSCOPY;  Surgeon: YAHIR Marc MD;  Location: [facility] ENDOSCOPY    Tonsillectomy           REVIEW OF  SYSTEMS    System Neg/Pos Details   Constitutional Negative Fatigue, fever and weight loss.   ENMT Negative Drooling.   Eyes Negative Blurred vision and vision changes.   Respiratory Negative Dyspnea and wheezing.   Cardio Negative Chest pain, irregular heartbeat/palpitations and syncope.   GI Negative Abdominal pain and diarrhea.   Endocrine Negative Cold intolerance and heat intolerance.   Neuro Negative Tremors.   Psych Negative Anxiety and depression.   Integumentary Negative Frequent skin infections, pigment change and rash.   Hema/Lymph Negative Easy bleeding and easy bruising.           PHYSICAL EXAM    There were no vitals taken for this visit.       Constitutional Normal Overall appearance - Normal.   Psychiatric Normal Orientation - Oriented to time, place, person & situation. Appropriate mood and affect.   Neck Exam Normal Inspection - Normal. Palpation - Normal. Parotid gland - Normal. Thyroid gland - Normal.   Eyes Normal Conjunctiva - Right: Normal, Left: Normal. Pupil - Right: Normal, Left: Normal. Fundus - Right: Normal, Left: Normal.   Neurological Normal Memory - Normal. Cranial nerves - Cranial nerves II through XII grossly intact.   Head/Face Normal Facial features - Normal. Eyebrows - Normal. Skull - Normal.        Nasopharynx Normal External nose - Normal. Lips/teeth/gums - Normal. Tonsils - Normal. Oropharynx - Normal.   Ears Normal Inspection - Right: Normal, Left: Normal. Canal - Right: Normal, Left: Normal. TM - Right: Normal, Left: Normal.   Skin Normal Inspection - Normal.        Lymph Detail Normal Submental. Submandibular. Anterior cervical. Posterior cervical. Supraclavicular.        Nose/Mouth/Throat Normal External nose - Normal. Lips/teeth/gums - Normal. Tonsils - Normal. Oropharynx - Normal.   Nose/Mouth/Throat Normal Nares - Right: Normal Left: Normal. Septum -Normal  Turbinates - Right: Normal, Left: Normal.       Current Outpatient Medications:     metFORMIN  MG Oral  Tablet 24 Hr, Take 2 tablets (1,000 mg total) by mouth 2 (two) times daily with meals., Disp: 360 tablet, Rfl: 0    levothyroxine 25 MCG Oral Tab, Take 1 tablet (25 mcg total) by mouth before breakfast., Disp: 90 tablet, Rfl: 1    lisinopril 40 MG Oral Tab, Take 1 tablet (40 mg total) by mouth daily., Disp: 90 tablet, Rfl: 1    pravastatin 40 MG Oral Tab, Take 1 tablet (40 mg total) by mouth nightly., Disp: 90 tablet, Rfl: 1    latanoprost 0.005 % Ophthalmic Solution, INSTILL 1 DROP INTO BOTH EYES EVERY EVEING, Disp: 3 each, Rfl: 3    acetaminophen 500 MG Oral Tab, Take 1 tablet (500 mg total) by mouth every 6 (six) hours as needed for Pain. Takes one at night time, Disp: , Rfl:     Glucose Blood (FREESTYLE LITE TEST) In Vitro Strip, Use test strips to check blood sugar 3 times per day as directed., Disp: 300 strip, Rfl: 1    FreeStyle Lancets Does not apply Misc, Use lancets to check blood sugar 3 times per day as directed., Disp: 300 each, Rfl: 1    amLODIPine 5 MG Oral Tab, Take 1 tablet (5 mg total) by mouth 2 (two) times daily., Disp: 180 tablet, Rfl: 3    atenolol 50 MG Oral Tab, Take 1 tablet (50 mg total) by mouth 2 (two) times daily., Disp: 180 tablet, Rfl: 3    Fenofibrate 134 MG Oral Cap, Take 134 mg by mouth daily., Disp: 90 capsule, Rfl: 0    JANUVIA 100 MG Oral Tab, TAKE 1 TABLET DAILY, Disp: 90 tablet, Rfl: 3    hydrALAZINE 10 MG Oral Tab, Take 1 tablet (10 mg total) by mouth 3 (three) times daily., Disp: 270 tablet, Rfl: 3    Icosapent Ethyl (VASCEPA) 1 g Oral Cap, Take 1 capsule by mouth 2 (two) times daily., Disp: 180 capsule, Rfl: 3    meclizine 25 MG Oral Tab, Take 1 tablet (25 mg total) by mouth 3 (three) times daily as needed., Disp: 15 tablet, Rfl: 0    ketoconazole 2 % External Cream, Apply to feet 3 times weekly, Disp: 60 g, Rfl: 11    azelastine 0.1 % Nasal Solution, 2 sprays by Nasal route 2 (two) times daily., Disp: 30 mL, Rfl: 3  ASSESSMENT AND PLAN    1. Bilateral impacted cerumen    -  REMOVAL IMPACTED CERUMEN REQUIRING INSTRUMENTATION, UNILATERAL        This note was prepared using Dragon Medical voice recognition dictation software. As a result errors may occur. When identified these errors have been corrected. While every attempt is made to correct errors during dictation discrepancies may still exist    Murtaza Guajardo MD    4/16/2024    9:23 AM

## 2024-04-22 RX ORDER — FENOFIBRATE 134 MG/1
134 CAPSULE ORAL DAILY
Qty: 90 CAPSULE | Refills: 1 | Status: SHIPPED | OUTPATIENT
Start: 2024-04-22

## 2024-04-22 NOTE — TELEPHONE ENCOUNTER
Endocrine refill protocol for lipid lowering medications    Protocol Criteria:  Appointment with Endocrinology completed in the last 6 months or scheduled in the next 3 months     Verify appointment has been completed or scheduled in the appropriate timeline. If so can send a 90 day supply with 1 refill.   Lipid panel must have been completed in the last 12 months   ALT result <80  LDL result <130    Cholesterol: 187, done on 3/7/2024.  HDL Cholesterol: 37, done on 3/7/2024.  LDL Cholesterol: 107, done on 3/7/2024.  TriGlycerides 249, done on 3/7/2024.     Last completed office visit:02/09/24  Next scheduled Follow up: 06/03/24  Last Lipid panel date: 03/07/24  Last ALT result: 49  Last LDL result;107

## 2024-05-15 RX ORDER — ICOSAPENT ETHYL 1 G/1
1 CAPSULE ORAL 2 TIMES DAILY
Qty: 180 CAPSULE | Refills: 3 | Status: SHIPPED | OUTPATIENT
Start: 2024-05-15

## 2024-05-15 NOTE — TELEPHONE ENCOUNTER
Refill passed per [facility] protocols.    Requested Prescriptions   Pending Prescriptions Disp Refills    ICOSAPENT ETHYL 1 g Oral Cap [Pharmacy Med Name: ICOSAPENT ETHYL CAPS 1GM] 180 capsule 3     Sig: TAKE 1 CAPSULE TWICE A DAY       Cholesterol Medication Protocol Passed - 5/13/2024 11:47 PM        Passed - ALT < 80     Lab Results   Component Value Date    ALT 49 09/29/2023             Passed - ALT resulted within past year        Passed - Lipid panel within past 12 months     Lab Results   Component Value Date    CHOLEST 187 03/07/2024    TRIG 249 (H) 03/07/2024    HDL 37 (L) 03/07/2024     (H) 03/07/2024    VLDL 43 (H) 03/07/2024    NONHDLC 150 (H) 03/07/2024             Passed - In person appointment or virtual visit in the past 12 mos or appointment in next 3 mos     Recent Outpatient Visits              4 weeks ago Bilateral impacted cerumen    [facility] Murtaza Guajardo MD    Office Visit    2 months ago Type 2 diabetes mellitus without complication, without long-term current use of insulin ([facility])    [facility] Mercedes Ordoñez DPM    Office Visit    3 months ago Type 2 diabetes mellitus with hyperglycemia, unspecified whether long term insulin use ([facility])    [facility] Rochelle Baker MD    Office Visit    4 months ago Encounter for annual health examination    [facility] Floyd Coelho MD    Office Visit    4 months ago Lentigines    [facility]    [facility] Visit          Future Appointments         Provider Department Appt Notes    In 2 weeks Mercedes Ordoñez DPM [facility] 3 months    In 2 weeks Rochelle Baker MD [facility] Diabetes test results.    In 1  month Floyd Coelho MD [facility] 6 MTH FOLLOW UP    In 2 months Ricki Laureano MD [facility] EP/ VF, OCT and DM EE    In 5 months David Kennedy MD [facility] 8am cysto [id number]    In 5 months Alex Zuluaga MD [facility] full body

## 2024-05-31 ENCOUNTER — LAB ENCOUNTER (OUTPATIENT)
Dept: LAB | Facility: HOSPITAL | Age: 74
End: 2024-05-31
Attending: INTERNAL MEDICINE
Payer: MEDICARE

## 2024-05-31 DIAGNOSIS — E11.65 TYPE 2 DIABETES MELLITUS WITH HYPERGLYCEMIA, UNSPECIFIED WHETHER LONG TERM INSULIN USE (HCC): ICD-10-CM

## 2024-05-31 DIAGNOSIS — E03.8 SUBCLINICAL HYPOTHYROIDISM: ICD-10-CM

## 2024-05-31 DIAGNOSIS — E78.5 DYSLIPIDEMIA: ICD-10-CM

## 2024-05-31 LAB
CHOLEST SERPL-MCNC: 184 MG/DL (ref ?–200)
CREAT UR-SCNC: 19 MG/DL
FASTING PATIENT LIPID ANSWER: YES
HDLC SERPL-MCNC: 38 MG/DL (ref 40–59)
LDLC SERPL CALC-MCNC: 93 MG/DL (ref ?–100)
MICROALBUMIN UR-MCNC: 0.3 MG/DL
MICROALBUMIN/CREAT 24H UR-RTO: 15.8 UG/MG (ref ?–30)
NONHDLC SERPL-MCNC: 146 MG/DL (ref ?–130)
T4 FREE SERPL-MCNC: 1.2 NG/DL (ref 0.8–1.7)
TRIGL SERPL-MCNC: 320 MG/DL (ref 30–149)
TSI SER-ACNC: 5.55 MIU/ML (ref 0.55–4.78)
VLDLC SERPL CALC-MCNC: 53 MG/DL (ref 0–30)

## 2024-05-31 PROCEDURE — 82570 ASSAY OF URINE CREATININE: CPT

## 2024-05-31 PROCEDURE — 82043 UR ALBUMIN QUANTITATIVE: CPT

## 2024-05-31 PROCEDURE — 84443 ASSAY THYROID STIM HORMONE: CPT

## 2024-05-31 PROCEDURE — 36415 COLL VENOUS BLD VENIPUNCTURE: CPT

## 2024-05-31 PROCEDURE — 80061 LIPID PANEL: CPT

## 2024-05-31 PROCEDURE — 84439 ASSAY OF FREE THYROXINE: CPT

## 2024-06-03 ENCOUNTER — OFFICE VISIT (OUTPATIENT)
Dept: ENDOCRINOLOGY CLINIC | Facility: CLINIC | Age: 74
End: 2024-06-03
Payer: MEDICARE

## 2024-06-03 ENCOUNTER — OFFICE VISIT (OUTPATIENT)
Dept: PODIATRY CLINIC | Facility: CLINIC | Age: 74
End: 2024-06-03
Payer: MEDICARE

## 2024-06-03 VITALS
WEIGHT: 188 LBS | HEART RATE: 60 BPM | SYSTOLIC BLOOD PRESSURE: 125 MMHG | DIASTOLIC BLOOD PRESSURE: 67 MMHG | HEIGHT: 68 IN | BODY MASS INDEX: 28.49 KG/M2

## 2024-06-03 DIAGNOSIS — E78.5 DYSLIPIDEMIA: ICD-10-CM

## 2024-06-03 DIAGNOSIS — E11.65 TYPE 2 DIABETES MELLITUS WITH HYPERGLYCEMIA, UNSPECIFIED WHETHER LONG TERM INSULIN USE (HCC): Primary | ICD-10-CM

## 2024-06-03 DIAGNOSIS — E03.8 SUBCLINICAL HYPOTHYROIDISM: ICD-10-CM

## 2024-06-03 DIAGNOSIS — E11.9 TYPE 2 DIABETES MELLITUS WITHOUT COMPLICATION, WITHOUT LONG-TERM CURRENT USE OF INSULIN (HCC): Primary | ICD-10-CM

## 2024-06-03 LAB
GLUCOSE BLOOD: 160
HEMOGLOBIN A1C: 7.2 % (ref 4.3–5.6)
TEST STRIP LOT #: NORMAL NUMERIC

## 2024-06-03 PROCEDURE — 82947 ASSAY GLUCOSE BLOOD QUANT: CPT | Performed by: INTERNAL MEDICINE

## 2024-06-03 PROCEDURE — 99214 OFFICE O/P EST MOD 30 MIN: CPT | Performed by: INTERNAL MEDICINE

## 2024-06-03 PROCEDURE — 99213 OFFICE O/P EST LOW 20 MIN: CPT | Performed by: STUDENT IN AN ORGANIZED HEALTH CARE EDUCATION/TRAINING PROGRAM

## 2024-06-03 PROCEDURE — 83036 HEMOGLOBIN GLYCOSYLATED A1C: CPT | Performed by: INTERNAL MEDICINE

## 2024-06-03 NOTE — PROGRESS NOTES
Return Office Visit     CHIEF COMPLAINT:    DM    Dyslipidemia  Subclinical Hypothyroidism      HISTORY OF PRESENT ILLNESS:  Rob Jones is a 73 year old male who presents for follow up for DM.       DM HISTORY:  Diagnosed in 2000       HISTORY OF DIABETES COMPLICATIONS: :  History of Retinopathy: No - last eye exam:  June 2023  History of Neuropathy: No  History of Nephropathy: No    ASSOCIATED COMPLICATIONS:    HTN: Yes  Hyperlipidemia: Yes  Coronary Artery Disease:  No  Cerebrovascular Disease: No      HOME GLUCOSE READINGS:   Fasting/ pre dinner 130-180      CURRENT DIABETIC MEDICATIONS INCLUDE:    MTF  mg TID, had SE with 4 tabs daily   Januvia 100 mg daily    MEALS:  Three meals a day  Dietary compliance is moderate      EXERCISE:  no      CURRENT MEDICATION:    Current Outpatient Medications   Medication Sig Dispense Refill    Icosapent Ethyl 1 g Oral Cap Take 1 capsule by mouth 2 (two) times daily. 180 capsule 3    Fenofibrate 134 MG Oral Cap Take 134 mg by mouth daily. 90 capsule 1    metFORMIN  MG Oral Tablet 24 Hr Take 2 tablets (1,000 mg total) by mouth 2 (two) times daily with meals. 360 tablet 0    levothyroxine 25 MCG Oral Tab Take 1 tablet (25 mcg total) by mouth before breakfast. 90 tablet 1    lisinopril 40 MG Oral Tab Take 1 tablet (40 mg total) by mouth daily. 90 tablet 1    pravastatin 40 MG Oral Tab Take 1 tablet (40 mg total) by mouth nightly. 90 tablet 1    latanoprost 0.005 % Ophthalmic Solution INSTILL 1 DROP INTO BOTH EYES EVERY EVEING 3 each 3    acetaminophen 500 MG Oral Tab Take 1 tablet (500 mg total) by mouth every 6 (six) hours as needed for Pain. Takes one at night time      Glucose Blood (FREESTYLE LITE TEST) In Vitro Strip Use test strips to check blood sugar 3 times per day as directed. 300 strip 1    FreeStyle Lancets Does not apply Misc Use lancets to check blood sugar 3 times per day as directed. 300 each 1    amLODIPine 5 MG Oral Tab Take 1 tablet (5 mg  total) by mouth 2 (two) times daily. 180 tablet 3    atenolol 50 MG Oral Tab Take 1 tablet (50 mg total) by mouth 2 (two) times daily. 180 tablet 3    JANUVIA 100 MG Oral Tab TAKE 1 TABLET DAILY 90 tablet 3    hydrALAZINE 10 MG Oral Tab Take 1 tablet (10 mg total) by mouth 3 (three) times daily. 270 tablet 3    meclizine 25 MG Oral Tab Take 1 tablet (25 mg total) by mouth 3 (three) times daily as needed. 15 tablet 0    ketoconazole 2 % External Cream Apply to feet 3 times weekly 60 g 11    azelastine 0.1 % Nasal Solution 2 sprays by Nasal route 2 (two) times daily. 30 mL 3         ALLERGY:  Allergies   Allergen Reactions    Tetanus Immune Globulin UNKNOWN     Unsure of reaction from childhood  Allergy to horse hair/serum.       PAST MEDICAL, SOCIAL AND FAMILY HISTORY:  See past medical history marked as reviewed.  See past surgical history marked as reviewed.  See past family history marked as reviewed.  See past social history marked as reviewed.    ASSESSMENTS:     REVIEW OF SYSTEMS:  Constitutional: Negative for: weight change, fever, fatigue, cold/heat intolerance  Eyes: Negative for:  Visual changes, proptosis, blurring  ENT: Negative for:  dysphagia, neck swelling, dysphonia  Respiratory: Negative for:  dyspnea, cough  Cardiovascular: Negative for:  chest pain, palpitations, orthopnea  GI: Negative for:  abdominal pain, nausea, vomiting, diarrhea, constipation, bleeding  Neurology: Negative for: headache, numbness, weakness  Genito-Urinary: Negative for: dysuria, frequency  Psychiatric: Negative for:  depression, anxiety  Hematology/Lymphatics: Negative for: bruising, lower extremity edema  Endocrine: Negative for: polyuria, polydypsia  Skin: Negative for: rash, blister, cellulitis,       PHYSICAL EXAM:   Vitals reviewed    General Appearance:  alert, well developed, in no acute distress  Head: Atraumatic  Eyes:  normal conjunctivae, sclera., normal sclera and normal pupils  Throat/Neck: normal sound to voice.  Normal hearing, normal speech  Respiratory:  Speaking in full sentences, non-labored. no increased work of breathing, no audible wheezing    Neuro: motor grossly intact, moving all extremities without difficulty  Psychiatric:  oriented to time, self, and place  Extremities: June 2023  Bilateral barefoot skin diabetic exam is normal, visualized feet and the appearance is normal.  Bilateral monofilament/sensation of both feet is normal.  Pulsation pedal pulse exam of both lower legs/feet is normal as well.              DATA:         A1c 7.2 % ( 4/2024)  Reviewed labs    ASSESSMENT AND PLAN:    1. Type 2 DM:      Plan:  Discussed the pathogenesis, natural course of diabetes. Patient understands the importance of glycemic control and the implications of uncontrolled diabetes including Diabetic ketoacidosis and various micro vascular and macrovascular complications.      a). Medications:      C/w MTF ER 1000 mg TID with meals  Take with food    C/w Januvia 100 mg daily  SE reviewed  Monitor and send BG as discussed      SGLT2 inhibitors and actos: will avoid given h/o bladder cancer    Reviewed exercise  Continue to work on low carb diet        b). Recent  MA normal, will monitor. Good Bg and BP control  c). Instructed on importance of annual eye exams.   d). Foot exam: Daily feet exam explained  e). BG log maintainence explained in great detail, to get log and glucometer on next visit.  f). Life style changes discussed  g). Hypoglycemia recognition and management discussed    2.Dyslipidemia  A) Discussed lifestyle modifications including reductions in dietary total and saturated fat, weight loss, aerobic exercise, and eating a diet rich in fruits and vegetables.  B) c/w simvastatin.  C) Fasting lipid panel reviewed  LDL cholesterol is okay   However, his TG level is elevated   Discussed that when these are over 500, they can increase risk of pancreatitis  C/w fenofibrate and monitor labs in addition to a low fat diet  and daily exercise  Fenofibrate 134 mg daily   Fasting lipid panel  reviewed  3. BP is normal  4. Subclinical Hypothyroidism  On LT4 25 mcg daily  Reports compliance and takes it empty stomach  CPM  TSH was slightly high on last check  LT4 : increase to 50 mcg daily --> he will like to take two tablets for the 25 mcg dose since he has a lot of medication at home  TSH with reflex 6-8 weeks      RTC in 4 months.   Check and call with sugars as discussed

## 2024-06-03 NOTE — PROGRESS NOTES
[facility]  Progress Note      Rob Jones is a 73 year old male.   Chief Complaint   Patient presents with    Diabetic Foot Care     Diabetic Foot Care, no pain at this time,  yesterday morning, on 2/9/24 A1C 7.5.              HPI:       Patient is a pleasant 73-year-old diabetic male presents to clinic for bilateral foot evaluation.  He admits to some pain on his right great toe with touch.  He admits of thickened and elongated toenails that are hard to trim.       Allergies: Tetanus immune globulin    Current Outpatient Medications   Medication Sig Dispense Refill    Icosapent Ethyl 1 g Oral Cap Take 1 capsule by mouth 2 (two) times daily. 180 capsule 3    Fenofibrate 134 MG Oral Cap Take 134 mg by mouth daily. 90 capsule 1    metFORMIN  MG Oral Tablet 24 Hr Take 2 tablets (1,000 mg total) by mouth 2 (two) times daily with meals. 360 tablet 0    levothyroxine 25 MCG Oral Tab Take 1 tablet (25 mcg total) by mouth before breakfast. 90 tablet 1    lisinopril 40 MG Oral Tab Take 1 tablet (40 mg total) by mouth daily. 90 tablet 1    pravastatin 40 MG Oral Tab Take 1 tablet (40 mg total) by mouth nightly. 90 tablet 1    latanoprost 0.005 % Ophthalmic Solution INSTILL 1 DROP INTO BOTH EYES EVERY EVEING 3 each 3    acetaminophen 500 MG Oral Tab Take 1 tablet (500 mg total) by mouth every 6 (six) hours as needed for Pain. Takes one at night time      Glucose Blood (FREESTYLE LITE TEST) In Vitro Strip Use test strips to check blood sugar 3 times per day as directed. 300 strip 1    FreeStyle Lancets Does not apply Misc Use lancets to check blood sugar 3 times per day as directed. 300 each 1    amLODIPine 5 MG Oral Tab Take 1 tablet (5 mg total) by mouth 2 (two) times daily. 180 tablet 3    atenolol 50 MG Oral Tab Take 1 tablet (50 mg total) by mouth 2 (two) times daily. 180 tablet 3    JANUVIA 100 MG Oral Tab TAKE 1 TABLET DAILY 90 tablet 3    hydrALAZINE 10 MG Oral Tab Take 1 tablet (10  mg total) by mouth 3 (three) times daily. 270 tablet 3    meclizine 25 MG Oral Tab Take 1 tablet (25 mg total) by mouth 3 (three) times daily as needed. 15 tablet 0    ketoconazole 2 % External Cream Apply to feet 3 times weekly 60 g 11    azelastine 0.1 % Nasal Solution 2 sprays by Nasal route 2 (two) times daily. 30 mL 3      Past Medical History:    Asthma (HCC)    BCC (basal cell carcinoma)    right medial cheek    Bell's palsy    Calculus of kidney    Cancer (HCC)    Bladder cancer. Patient of Dr. Ryan    Colon polyp    Diabetes (HCC)    Essential hypertension    Glaucoma    20 1st visit w/ GISELLEM, patient was on Latanoprost qhs OU since around  per Dr. 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    Colonoscopy N/A 2021    Procedure: COLONOSCOPY;  Surgeon: YAHIR Marc MD;  Location: [facility] ENDOSCOPY    Tonsillectomy        Family History   Problem Relation Age of Onset    Other (Other) Father          of stroke    Other (Other) Mother         Alzheimer's Disease    Diabetes Sister     Glaucoma Neg     Macular degeneration Neg       Social History     Socioeconomic History    Marital status:    Tobacco Use    Smoking status: Never     Passive exposure: Never    Smokeless tobacco: Never    Tobacco comments:     Second-hand smoke from smoking parents and college roommate   Vaping Use    Vaping status: Never Used   Substance and Sexual Activity    Alcohol use: Yes     Comment: 1-2 drinks per month    Drug use: Never    Sexual activity: Not Currently     Partners: Female   Other Topics Concern    Grew up on a farm No     History of tanning Yes    Outdoor occupation No    Reaction to local anesthetic No    Pt has a pacemaker No    Pt has a defibrillator No           REVIEW OF SYSTEMS:     Denies nause, fever, chills  No calf pain  Denies chest pain or SOB      EXAM:   There were no vitals taken for this visit.  GENERAL: well developed, well nourished, in no apparent distress  EXTREMITIES:   1. Integument: Normal skin temperature and turgor. Toenails x10 are elongated, thickened and discolored with subungal derbi.  No acute signs of infection to right great toe.    2. Vascular: Dorsalis pedis two out of four bilateral and posterior tibial pulses two out of   four bilateral, capillary refill normal.   3. Musculoskeletal: All muscle groups are graded 5 out of 5 in the foot and ankle.   4. Neurological: Normal sharp dull sensation; reflexes normal.             ASSESSMENT AND PLAN:   Diagnoses and all orders for this visit:    Type 2 diabetes mellitus without complication, without long-term current use of insulin ([facility])          Plan:       -Patient examined, chart history reviewed.  -Discussed importance of proper pedal hygiene, regular foot checks, and tight glucose control.  -Sharply debrided nails x5 with a sterile nail nipper achieving a 20% reduction in thickness and length, without incident.   -Ambulate with supportive shoes and inserts and avoid walking barefoot.  -Educated patient on acute signs of infection advised patient to seek immediate medical attention if symptoms arise.    Bilateral barefoot skin diabetic exam is normal, visualized feet and the appearance is normal.  Bilateral monofilament/sensation of both feet is normal.  Pulsation pedal pulse exam of both lower legs/feet is normal as well.        RTC in 3 weeks      The patient indicates understanding of these issues and agrees to the plan.        Mercedes Ordoñez DPM

## 2024-06-21 ENCOUNTER — OFFICE VISIT (OUTPATIENT)
Dept: INTERNAL MEDICINE CLINIC | Facility: CLINIC | Age: 74
End: 2024-06-21

## 2024-06-21 VITALS
SYSTOLIC BLOOD PRESSURE: 126 MMHG | WEIGHT: 187 LBS | HEIGHT: 68 IN | DIASTOLIC BLOOD PRESSURE: 67 MMHG | HEART RATE: 51 BPM | BODY MASS INDEX: 28.34 KG/M2

## 2024-06-21 DIAGNOSIS — E78.5 HYPERLIPIDEMIA, UNSPECIFIED HYPERLIPIDEMIA TYPE: ICD-10-CM

## 2024-06-21 DIAGNOSIS — R97.20 ELEVATED PSA: ICD-10-CM

## 2024-06-21 DIAGNOSIS — I10 ESSENTIAL HYPERTENSION: ICD-10-CM

## 2024-06-21 DIAGNOSIS — E11.69 TYPE 2 DIABETES MELLITUS WITH OTHER SPECIFIED COMPLICATION, UNSPECIFIED WHETHER LONG TERM INSULIN USE (HCC): Primary | ICD-10-CM

## 2024-06-21 DIAGNOSIS — I65.29 CAROTID ATHEROSCLEROSIS, UNSPECIFIED LATERALITY: ICD-10-CM

## 2024-06-21 PROCEDURE — 99214 OFFICE O/P EST MOD 30 MIN: CPT | Performed by: INTERNAL MEDICINE

## 2024-06-21 PROCEDURE — G2211 COMPLEX E/M VISIT ADD ON: HCPCS | Performed by: INTERNAL MEDICINE

## 2024-06-21 NOTE — PROGRESS NOTES
HPI:    Patient ID: Rob Jones is a 73 year old male.    HPI  Patient here for follow-up on chronic medical issues.  Last seen here by me in December for annual wellness visit.  Having some issues with sleep at that time.  Since that time he has seen endocrinology, podiatry, ENT.  Current medications reviewed.  Health maintenance and vaccination status reviewed.  Last A1c was on Kathy 3 and it was 7.2.  Full blood work done by me September 29 of last year.  Endocrinology has been following lipid profile, A1c, TSH. The thyroid dose was recently increased from 25 to 50 a day; no change in symptoms.     Patient does check blood pressure at home. It has been running around  127-161/(nl DBP). HR is in the 50s.  Patient does check sugar at home. It has been running around 101-234; tends to be higher in the AM.    He notes a couple of things. He is taking regular tylenol at night that definitely seems to help. He wakes up every 2-3 hours at night to urinate. He can fall back to sleep ok. He was on FLomax in the past; stopped in 2022. He does not want to resume. Due for cysto this autumn. No kidney stones. No changes in memory or concentration.          Patient Active Problem List   Diagnosis    Sensorineural hearing loss, bilateral    Type 2 diabetes mellitus without retinopathy (HCC)    Essential hypertension    Hyperlipidemia    History of kidney stones    Bladder tumor    Primary open angle glaucoma of both eyes, mild stage    Age-related nuclear cataract of both eyes    Floaters in visual field, bilateral    Blepharitis of upper and lower eyelids of both eyes    Myopia with astigmatism and presbyopia, bilateral    Polyp of colon    Type 2 diabetes mellitus with other specified complication, unspecified whether long term insulin use (HCC)          HISTORY:  Past Medical History:    Asthma (HCC)    BCC (basal cell carcinoma)    right medial cheek    Bell's palsy    Calculus of kidney    Cancer (HCC)    Bladder  cancer. Patient of Dr. Ryan    Colon polyp    Diabetes (HCC)    Essential hypertension    Glaucoma    20 1st visit w/ RJM, patient was on Latanoprost qhs OU since around  per Dr. Guzman     High blood pressure    High cholesterol    Hyperlipidemia    Kidney stone    Pyloric stenosis (HCC)    Roberts Hunt syndrome (geniculate herpes zoster)    Rx with prednisone    Screen for colon cancer    repeat CLN in 5-7 years    Skin cancer    left shoulder       Past Surgical History:   Procedure Laterality Date    Colonoscopy      Colonoscopy      Polyps removed, I have a colonoscopy every 5 years.    Colonoscopy N/A 2021    Procedure: COLONOSCOPY;  Surgeon: YAHIR Marc MD;  Location: [facility] ENDOSCOPY    Tonsillectomy        Family History   Problem Relation Age of Onset    Other (Other) Father          of stroke    Other (Other) Mother         Alzheimer's Disease    Diabetes Sister     Glaucoma Neg     Macular degeneration Neg       Social History     Socioeconomic History    Marital status:    Tobacco Use    Smoking status: Never     Passive exposure: Never    Smokeless tobacco: Never    Tobacco comments:     Second-hand smoke from smoking parents and college roommate   Vaping Use    Vaping status: Never Used   Substance and Sexual Activity    Alcohol use: Yes     Comment: 1-2 drinks per month    Drug use: Never    Sexual activity: Not Currently     Partners: Female   Other Topics Concern    Grew up on a farm No    History of tanning Yes    Outdoor occupation No    Reaction to local anesthetic No    Pt has a pacemaker No    Pt has a defibrillator No          Review of Systems          Current Outpatient Medications   Medication Sig Dispense Refill    Icosapent Ethyl 1 g Oral Cap Take 1 capsule by mouth 2 (two) times daily. 180 capsule 3    Fenofibrate 134 MG Oral Cap Take 134 mg by mouth daily. 90 capsule 1    metFORMIN  MG Oral Tablet 24 Hr Take 2 tablets (1,000 mg total) by mouth  2 (two) times daily with meals. 360 tablet 0    levothyroxine 25 MCG Oral Tab Take 1 tablet (25 mcg total) by mouth before breakfast. 90 tablet 1    lisinopril 40 MG Oral Tab Take 1 tablet (40 mg total) by mouth daily. 90 tablet 1    pravastatin 40 MG Oral Tab Take 1 tablet (40 mg total) by mouth nightly. 90 tablet 1    latanoprost 0.005 % Ophthalmic Solution INSTILL 1 DROP INTO BOTH EYES EVERY EVEING 3 each 3    acetaminophen 500 MG Oral Tab Take 1 tablet (500 mg total) by mouth every 6 (six) hours as needed for Pain. Takes one at night time. Pt stts he takes for sleep aid      Glucose Blood (FREESTYLE LITE TEST) In Vitro Strip Use test strips to check blood sugar 3 times per day as directed. 300 strip 1    FreeStyle Lancets Does not apply Misc Use lancets to check blood sugar 3 times per day as directed. 300 each 1    amLODIPine 5 MG Oral Tab Take 1 tablet (5 mg total) by mouth 2 (two) times daily. 180 tablet 3    atenolol 50 MG Oral Tab Take 1 tablet (50 mg total) by mouth 2 (two) times daily. 180 tablet 3    JANUVIA 100 MG Oral Tab TAKE 1 TABLET DAILY 90 tablet 3    hydrALAZINE 10 MG Oral Tab Take 1 tablet (10 mg total) by mouth 3 (three) times daily. 270 tablet 3    meclizine 25 MG Oral Tab Take 1 tablet (25 mg total) by mouth 3 (three) times daily as needed. 15 tablet 0    ketoconazole 2 % External Cream Apply to feet 3 times weekly 60 g 11     Allergies:  Allergies   Allergen Reactions    Tetanus Immune Globulin UNKNOWN     Unsure of reaction from childhood  Allergy to horse hair/serum.        PHYSICAL EXAM:   /67 (BP Location: Left arm, Patient Position: Sitting, Cuff Size: adult)   Pulse 51   Ht 5' 8\" (1.727 m)   Wt 187 lb (84.8 kg)   BMI 28.43 kg/m²      Physical Exam  Constitutional:       Appearance: Normal appearance. He is well-developed.   HENT:      Nose: Nose normal.      Mouth/Throat:      Pharynx: No oropharyngeal exudate or posterior oropharyngeal erythema.   Eyes:       Conjunctiva/sclera: Conjunctivae normal.   Neck:      Vascular: No carotid bruit.   Cardiovascular:      Rate and Rhythm: Normal rate and regular rhythm.      Pulses: Normal pulses.      Heart sounds: Normal heart sounds. No murmur heard.  Pulmonary:      Effort: Pulmonary effort is normal.      Breath sounds: Normal breath sounds. No wheezing or rales.   Abdominal:      General: Bowel sounds are normal.      Palpations: Abdomen is soft. There is no mass.      Tenderness: There is no abdominal tenderness.   Musculoskeletal:      Right lower leg: No edema.      Left lower leg: No edema.   Lymphadenopathy:      Cervical: No cervical adenopathy.   Skin:     General: Skin is warm and dry.      Findings: No rash.   Neurological:      General: No focal deficit present.      Mental Status: He is alert.   Psychiatric:         Mood and Affect: Mood normal.         Behavior: Behavior normal.         Thought Content: Thought content normal.          Wt Readings from Last 6 Encounters:   06/21/24 187 lb (84.8 kg)   06/03/24 188 lb (85.3 kg)   02/09/24 187 lb (84.8 kg)   12/20/23 188 lb (85.3 kg)   11/07/23 192 lb (87.1 kg)   10/20/23 191 lb (86.6 kg)             ASSESSMENT/PLAN:   1. Essential hypertension blood pressure is very well-controlled in the office today.  Somewhat elevated at home.  Various options discussed.  We will add a fourth dose of hydralazine 10 mg at nighttime.  Check blood pressure regularly.  Contact does  With an update in a few weeks.  Otherwise follow-up in 6 months for Medicare annual wellness.    2. Type 2 diabetes mellitus with other specified complication, unspecified whether long term insulin use (HCC)  A1c last was 7.2.  Continue follow-up with endocrinology and current medications.  Work on diet and exercise.    3. Hyperlipidemia, unspecified hyperlipidemia type  Stable.  Continue current dose of fenofibrate and pravastatin.    4. Carotid atherosclerosis, unspecified laterality  Asymptomatic.   Continue risk factor management.    5. Elevated PSA  Borderline elevated PSA.  Unchanged over the past few years.  He is having symptoms at night of nocturia.  We discussed the possibility of going on Flomax.  He been on it in the past.  Does not really want to start a new medication right now.  Sleeping is better with taking the regular Tylenol.  No changes for this issue.         Meds This Visit:  Requested Prescriptions      No prescriptions requested or ordered in this encounter       Imaging & Referrals:  None         Floyd Coelho MD

## 2024-07-09 RX ORDER — METFORMIN HYDROCHLORIDE 500 MG/1
1000 TABLET, EXTENDED RELEASE ORAL 2 TIMES DAILY WITH MEALS
Qty: 360 TABLET | Refills: 1 | Status: SHIPPED | OUTPATIENT
Start: 2024-07-09

## 2024-07-09 NOTE — TELEPHONE ENCOUNTER
Endocrine Refill protocol for metformin    Protocol Criteria: PASS    -Appointment with Endocrinology completed in the last 6 months or scheduled in the next 3 months    -GFR greater than or equal to 40 in the past 12 months     -A1c result below 8.5% in the past 6 months      Verify the above has been completed or scheduled in the appropriate timeline. If so can send a 90 day supply with 1 refill.     Last completed office visit:Visit date not found   Next scheduled Follow up:   Future Appointments   Date Time Provider Department Center   7/22/2024 11:45 AM Alex Zuluaga MD ECLMBDERM [facility] [place]   7/25/2024  9:45 AM Ricki Laureano MD ECCFHOPHTHA [facility]   9/5/2024  8:50 AM Mercedes Ordoñez DPM ECCFHPOD [facility]   10/17/2024  8:30 AM David Kennedy MD CCFHURO [facility]   11/4/2024  9:30 AM Rochelle Baker MD ECWMOENDO [facility]   11/6/2024  7:30 AM Alex Zuluaga MD ECSCHDERM [facility] [place]   12/11/2024 10:40 AM Floyd Coelho MD [facility]IM [facility]       Last GFR result:  78 on 9/29/23     Last A1c result: 7.2 on 6/3/24  Lab Results   Component Value Date     (H) 03/05/2021    A1C 7.2 (A) 06/03/2024

## 2024-07-15 DIAGNOSIS — I10 ESSENTIAL HYPERTENSION: ICD-10-CM

## 2024-07-17 RX ORDER — HYDRALAZINE HYDROCHLORIDE 10 MG/1
10 TABLET, FILM COATED ORAL 3 TIMES DAILY
Qty: 270 TABLET | Refills: 3 | Status: SHIPPED | OUTPATIENT
Start: 2024-07-17

## 2024-07-18 NOTE — TELEPHONE ENCOUNTER
Refill passed per [facility] protocol.    Requested Prescriptions   Pending Prescriptions Disp Refills    hydrALAZINE 10 MG Oral Tab 270 tablet 3     Sig: Take 1 tablet (10 mg total) by mouth 3 (three) times daily.       Hypertension Medications Protocol Passed - 7/15/2024  8:07 AM        Passed - CMP or BMP in past 12 months        Passed - Last BP reading less than 140/90     BP Readings from Last 1 Encounters:   06/21/24 126/67               Passed - In person appointment or virtual visit in the past 12 mos or appointment in next 3 mos     Recent Outpatient Visits              3 weeks ago Type 2 diabetes mellitus with other specified complication, unspecified whether long term insulin use ([facility])    [facility] Floyd Coelho MD    Office Visit    1 month ago Type 2 diabetes mellitus without complication, without long-term current use of insulin ([facility])    [facility] [place]    Office Visit    1 month ago Type 2 diabetes mellitus with hyperglycemia, unspecified whether long term insulin use ([facility])    [facility]    Office Visit    3 months ago Bilateral impacted cerumen    [facility]    Office Visit    4 months ago Type 2 diabetes mellitus without complication, without long-term current use of insulin ([facility])    [facility] [place] Mercedes Malave DPM    Office Visit          Future Appointments         Provider Department Appt Notes    In 5 days Alex Zuluaga MD [facility] [address], [place] spot on behind ear    In 1 week [facility] OCT and DM EE    In 1 month Mercedes Ordoñez DPM [facility],  [place] 3 months    In 3 months David Kennedy MD [facility], [place] 8am cysto BTS-km    In 3 months Rochelle Baker MD [facility], [place] 5 months    In 3 months Alex Zuluaga MD [place], [place] full body    In 4 months Floyd Coelho MD [facility], [place] AWV last 12/20/23                    Passed - EGFRCR or GFRNAA > 50     GFR Evaluation  EGFRCR: 78 , resulted on 9/29/2023             Future Appointments         Provider Department Appt Notes    In 5 days Alex Zuluaga MD [facility] [place] [place] spot on behind ear    In 1 week Ricki Laureano MD [facility], [place] EP/ VF, OCT and DM EE    In 1 month Mercedes Ordoñez DPM [facility], [place] 3 months    In 3 months David Kennedy MD [facility], [place] 8am cysto BTS-km    In 3 months Rochelle Baker MD [facility], [place] 5 months    In 3 months Alex Zuluaga MD [facility], [place] full body    In 4 months Floyd Coelho MD [facility], [place] AWV last 12/20/23          Recent Outpatient Visits              3 weeks ago Type 2 diabetes mellitus with other specified complication, unspecified whether long term insulin use (HCC)    [facility] Floyd Coelho MD    Office Visit    1 month ago Type 2 diabetes mellitus without complication, without long-term current use of insulin (HCC)    [facility] Mercedes Ordoñez DPM    Office Visit    1 month ago Type 2 diabetes mellitus with hyperglycemia, unspecified whether long term insulin use (HCC)    [place]  [facility], [facility], Rochelle Villegas MD    Office Visit    3 months ago Bilateral impacted cerumen    [facility], [facility], Murtaza West MD    Office Visit    4 months ago Type 2 diabetes mellitus without complication, without long-term current use of insulin (HCC)    [facility], [facility], Mercedes Malave DPM    Office Visit

## 2024-07-22 ENCOUNTER — OFFICE VISIT (OUTPATIENT)
Dept: DERMATOLOGY CLINIC | Facility: CLINIC | Age: 74
End: 2024-07-22
Payer: MEDICARE

## 2024-07-22 DIAGNOSIS — L57.0 MULTIPLE ACTINIC KERATOSES: ICD-10-CM

## 2024-07-22 DIAGNOSIS — D18.01 CHERRY ANGIOMA: ICD-10-CM

## 2024-07-22 DIAGNOSIS — L81.4 LENTIGINES: ICD-10-CM

## 2024-07-22 DIAGNOSIS — L82.1 SEBORRHEIC KERATOSES: Primary | ICD-10-CM

## 2024-07-22 DIAGNOSIS — D22.9 MULTIPLE BENIGN NEVI: ICD-10-CM

## 2024-07-22 NOTE — PROGRESS NOTES
July 22, 2024    Established patient     CHIEF COMPLAINT: FBSE    HISTORY OF PRESENT ILLNESS: .    1. Spot  Location: Behind right ear   Duration: 1 mth  Signs and symptoms: NONE  Current treatment: NONE      DERM HISTORY:  History of skin cancer: Yes-BCC-Right nare(Nostril)    FAMILY HISTORY:  History of melanoma: No    PAST MEDICAL HISTORY:  Past Medical History:    Asthma (HCC)    BCC (basal cell carcinoma)    right medial cheek    Bell's palsy    Calculus of kidney    Cancer (HCC)    Bladder cancer. Patient of Dr. Ryan    Colon polyp    Diabetes (HCC)    Essential hypertension    Glaucoma    12/31/20 1st visit w/ MARIBEL, patient was on Latanoprost qhs OU since around 2015 per Dr. Guzman     High blood pressure    High cholesterol    Hyperlipidemia    Kidney stone    Pyloric stenosis (HCC)    Gavino Hunt syndrome (geniculate herpes zoster)    Rx with prednisone    Screen for colon cancer    repeat CLN in 5-7 years    Skin cancer    left shoulder        REVIEW OF SYSTEMS:  Constitutional: Denies fever, chills, unintentional weight loss.   Skin as per HPI    Medications  Current Outpatient Medications   Medication Sig Dispense Refill    hydrALAZINE 10 MG Oral Tab Take 1 tablet (10 mg total) by mouth 3 (three) times daily. 270 tablet 3    METFORMIN  MG Oral Tablet 24 Hr TAKE 2 TABLETS(1000 MG) BY MOUTH TWICE DAILY WITH MEALS 360 tablet 1    Icosapent Ethyl 1 g Oral Cap Take 1 capsule by mouth 2 (two) times daily. 180 capsule 3    Fenofibrate 134 MG Oral Cap Take 134 mg by mouth daily. 90 capsule 1    levothyroxine 25 MCG Oral Tab Take 1 tablet (25 mcg total) by mouth before breakfast. 90 tablet 1    lisinopril 40 MG Oral Tab Take 1 tablet (40 mg total) by mouth daily. 90 tablet 1    pravastatin 40 MG Oral Tab Take 1 tablet (40 mg total) by mouth nightly. 90 tablet 1    latanoprost 0.005 % Ophthalmic Solution INSTILL 1 DROP INTO BOTH EYES EVERY EVEING 3 each 3    acetaminophen 500 MG Oral Tab Take 1 tablet  (500 mg total) by mouth every 6 (six) hours as needed for Pain. Takes one at night time. Pt stts he takes for sleep aid      Glucose Blood (FREESTYLE LITE TEST) In Vitro Strip Use test strips to check blood sugar 3 times per day as directed. 300 strip 1    FreeStyle Lancets Does not apply Misc Use lancets to check blood sugar 3 times per day as directed. 300 each 1    amLODIPine 5 MG Oral Tab Take 1 tablet (5 mg total) by mouth 2 (two) times daily. 180 tablet 3    atenolol 50 MG Oral Tab Take 1 tablet (50 mg total) by mouth 2 (two) times daily. 180 tablet 3    JANUVIA 100 MG Oral Tab TAKE 1 TABLET DAILY 90 tablet 3    meclizine 25 MG Oral Tab Take 1 tablet (25 mg total) by mouth 3 (three) times daily as needed. 15 tablet 0    ketoconazole 2 % External Cream Apply to feet 3 times weekly 60 g 11       PHYSICAL EXAM:  General: awake, alert, no acute distress  Skin: Skin exam was performed today including the following: head and face, scalp, neck, chest (including breasts and axillae), abdomen, back, bilateral upper extremities, bilateral lower extremities, hands, feet, digits, nails. Pertinent findings include:   - Scattered bright red-purple dome-shaped papules on the trunk and extremities   - Scattered light brown stellate macules on sun exposed sites  - Scattered, evenly colored, round brown macules and papules with regular borders on the trunk and extremities  - Numerous scattered skin-colored and brown, waxy, stuck-on papules and plaques on the trunk and extremities  - L ear lobe and R frontal scalp with pink gritty papules    ASSESSMENT & PLAN:  Pathophysiology of diagnoses discussed with patient.  Therapeutic options reviewed. Risks, benefits, and alternatives discussed with patient. Instructions reviewed at length.    #Lentigines  #Seborrheic keratoses   #Cherry angiomas   - Reassurance provided regarding the benign nature of these lesions.    #Multiple benign nevi  - Complete skin exam performed today with no  outlier lesions identified   - Reassured patient of benign nature of these lesions.   - Recommend daily photoprotection with broad-spectrum sunscreen, avoidance of sun during peak hours, and sun protective clothing.    - Dermoscopy was used for physical examination of pigmented lesions during today's office visit.    #Multiple actinic keratoses  - Discussed premalignant etiology and possibility of transformation to SCC  - Recommended cryotherapy today   - Discussed side effects including redness, swelling, crusting, and discolortion after treatment, wound care with soap/water and vaseline   - Recommend sun protection with spf 30 or higher, sun protective clothing such as wide brimmed hats and long sleeves. Recommend avoiding midday sun (10 am- 3 pm).     - Procedure Note Cryosurgery of pre-malignant lesion(s)  Risks, benefits, alternatives, complications, and personnel required for cryosurgery reviewed with patient. Patient verbalizes understanding and wishes to proceed.   - Cryosurgery performed with Liquid Nitrogen via cryostat spray gun to Actinic Keratosis . 2 lesion(s) treated.   - Patient tolerated well and wound care discussed. Return if lesions fail to fully resolve.    Return to clinic: 1 year  or sooner if something concerning arises     Alex Zuluaga MD

## 2024-07-31 ENCOUNTER — LAB ENCOUNTER (OUTPATIENT)
Dept: LAB | Facility: HOSPITAL | Age: 74
End: 2024-07-31
Attending: INTERNAL MEDICINE
Payer: MEDICARE

## 2024-07-31 DIAGNOSIS — E03.8 SUBCLINICAL HYPOTHYROIDISM: ICD-10-CM

## 2024-07-31 LAB — TSI SER-ACNC: 2.95 MIU/ML (ref 0.55–4.78)

## 2024-07-31 PROCEDURE — 36415 COLL VENOUS BLD VENIPUNCTURE: CPT

## 2024-07-31 PROCEDURE — 84443 ASSAY THYROID STIM HORMONE: CPT

## 2024-08-01 ENCOUNTER — PATIENT MESSAGE (OUTPATIENT)
Dept: ENDOCRINOLOGY CLINIC | Facility: CLINIC | Age: 74
End: 2024-08-01

## 2024-08-03 NOTE — TELEPHONE ENCOUNTER
From: Rochelle Baker  To: Rob Jones  Sent: 8/1/2024 4:09 PM CDT  Subject: Thyroid labs    Thyroid labs normal. I hope you are doing well. Thanks.

## 2024-08-14 ENCOUNTER — TELEPHONE (OUTPATIENT)
Dept: INTERNAL MEDICINE CLINIC | Facility: CLINIC | Age: 74
End: 2024-08-14

## 2024-08-26 ENCOUNTER — PATIENT MESSAGE (OUTPATIENT)
Dept: ENDOCRINOLOGY CLINIC | Facility: CLINIC | Age: 74
End: 2024-08-26

## 2024-08-26 RX ORDER — LEVOTHYROXINE SODIUM 50 UG/1
50 TABLET ORAL
Qty: 90 TABLET | Refills: 0 | Status: SHIPPED | OUTPATIENT
Start: 2024-08-26

## 2024-08-26 NOTE — TELEPHONE ENCOUNTER
From: Rob Jones  To: Rochelle Baker  Sent: 8/26/2024 10:54 AM CDT  Subject: Levothyroxine .025mg    Good morning, Dr. Baker. Based on a recent lab test, we increased my dosage of Levothyroxine from .025mg to .050mg. To accomplish this, I have been taking 2 of the .025 tablets every morning before breakfast. I have only 10 tablets remaining, and my current prescription has no refills. Would you please issue a new prescription for the .050 strength?    Thank you.    Rob Jones

## 2024-09-10 ENCOUNTER — PATIENT MESSAGE (OUTPATIENT)
Dept: INTERNAL MEDICINE CLINIC | Facility: CLINIC | Age: 74
End: 2024-09-10

## 2024-09-10 DIAGNOSIS — I10 ESSENTIAL HYPERTENSION: ICD-10-CM

## 2024-09-11 NOTE — TELEPHONE ENCOUNTER
Office visit note 6/21.  ASSESSMENT/PLAN:   1. Essential hypertension blood pressure is very well-controlled in the office today.  Somewhat elevated at home.  Various options discussed.  We will add a fourth dose of hydralazine 10 mg at nighttime.  Check blood pressure regularly.  Contact does  With an update in a few weeks.  Otherwise follow-up in 6 months for Medicare annual wellness.    Medication pended to run through protocol.

## 2024-09-13 RX ORDER — HYDRALAZINE HYDROCHLORIDE 10 MG/1
10 TABLET, FILM COATED ORAL 4 TIMES DAILY
Qty: 360 TABLET | Refills: 0 | Status: SHIPPED | OUTPATIENT
Start: 2024-09-13

## 2024-09-13 NOTE — TELEPHONE ENCOUNTER
Refill passes per [facility] protocol. - Dose was increased from three times daily to four times daily.   **3 month supply only - patient has medication follow up 12/11/2024    Future Appointments   Date Time Provider Department Center   12/11/2024 10:40 AM Floyd Coelho MD [facility]     Last office visit: 6/21/2024    Requested Prescriptions   Pending Prescriptions Disp Refills    hydrALAZINE 10 MG Oral Tab 360 tablet 3     Sig: Take 1 tablet (10 mg total) by mouth 4 (four) times daily.       Hypertension Medications Protocol Passed - 9/11/2024 12:37 PM        Passed - CMP or BMP in past 12 months        Passed - Last BP reading less than 140/90     BP Readings from Last 1 Encounters:   06/21/24 126/67               Passed - In person appointment or virtual visit in the past 12 mos or appointment in next 3 mos     Recent Outpatient Visits              1 month ago Seborrheic keratoses    [facility] [address], [place] Alex Zuluaga MD    Office Visit    2 months ago Type 2 diabetes mellitus with other specified complication, unspecified whether long term insulin use ([facility])    [facility] Floyd Coelho MD    Office Visit    3 months ago Type 2 diabetes mellitus without complication, without long-term current use of insulin ([facility])    [facility], Mercedes Malave DPM    Office Visit    3 months ago Type 2 diabetes mellitus with hyperglycemia, unspecified whether long term insulin use ([facility])    [facility]    Office Visit    5 months ago Bilateral impacted cerumen    [facility] Murtaza Guajardo MD    Office Visit          Future Appointments         Provider Department Appt Notes    In 1 month David Kennedy MD [facility] 8am cysto  BTS-km    In 1 month Rochelle Baker MD [facility], [place] 5 months    In 1 month Alex Zuluaga MD [facility], [place] full body    In 2 months Floyd Coelho MD [facility] [place] AWV last 12/20/23    In 3 months [facility]ez D[facility] 3 months    In 10 months Alex Zuluaga MD [facility] [address], [place]                     Passed - EGFRCR or GFRNAA > 50     GFR Evaluation  EGFRCR: 78 , resulted on 9/29/2023               Future Appointments         Provider Department Appt Notes    In 1 month David Kennedy MD [facility] 8am cysto BTS-km    In 1 month [facility] 5 months    In 1 month Alex Zuluaga MD [facility], [place] full body    In 2 months Floyd Coelho MD [facility] [place] AWV last 12/20/23    In 3 months Mercedes Ordoñez DPM [facility] 3 months    In 10 months Alex Zuluaga MD [facility] [address], [place]           Recent Outpatient Visits              1 month ago Seborrheic keratoses    [facility] [address], [place] Alex Zuluaga MD    Office Visit    2 months ago Type 2 diabetes mellitus with other specified complication, unspecified whether long term insulin use ([facility])    [facility] Floyd Coelho MD    Office Visit    3 months ago Type 2 diabetes mellitus without complication, without long-term current use of insulin (HCC)    [facility] Mercedes Ordoñez DPM    Office Visit    3 months ago Type 2 diabetes  mellitus with hyperglycemia, unspecified whether long term insulin use (HCC)    [facility], [facility], Rochelle Villegas MD    Office Visit    5 months ago Bilateral impacted cerumen    [facility], [facility], Murtaza West MD    Office Visit

## 2024-09-29 DIAGNOSIS — E11.65 TYPE 2 DIABETES MELLITUS WITH HYPERGLYCEMIA, UNSPECIFIED WHETHER LONG TERM INSULIN USE (HCC): ICD-10-CM

## 2024-09-30 RX ORDER — PRAVASTATIN SODIUM 40 MG
40 TABLET ORAL NIGHTLY
Qty: 90 TABLET | Refills: 1 | Status: SHIPPED | OUTPATIENT
Start: 2024-09-30

## 2024-10-07 ENCOUNTER — PATIENT MESSAGE (OUTPATIENT)
Dept: SURGERY | Facility: CLINIC | Age: 74
End: 2024-10-07

## 2024-10-07 RX ORDER — LISINOPRIL 40 MG/1
40 TABLET ORAL DAILY
Qty: 90 TABLET | Refills: 1 | Status: SHIPPED | OUTPATIENT
Start: 2024-10-07

## 2024-10-07 NOTE — TELEPHONE ENCOUNTER
From: Rob Jones  To: David Kennedy  Sent: 10/7/2024 11:59 AM CDT  Subject: Urine test    Helshaun Kennedy. I have a Cystoscopy coming up on . Do I need to get a urine test in advance of that test?    Rob Jones  : 1950

## 2024-10-17 ENCOUNTER — PROCEDURE (OUTPATIENT)
Dept: SURGERY | Facility: CLINIC | Age: 74
End: 2024-10-17
Payer: MEDICARE

## 2024-10-17 VITALS — SYSTOLIC BLOOD PRESSURE: 156 MMHG | HEART RATE: 57 BPM | DIASTOLIC BLOOD PRESSURE: 70 MMHG

## 2024-10-17 DIAGNOSIS — C67.2 MALIGNANT NEOPLASM OF LATERAL WALL OF URINARY BLADDER (HCC): Primary | ICD-10-CM

## 2024-10-17 DIAGNOSIS — N40.1 BENIGN PROSTATIC HYPERPLASIA WITH WEAK URINARY STREAM: ICD-10-CM

## 2024-10-17 DIAGNOSIS — R39.12 BENIGN PROSTATIC HYPERPLASIA WITH WEAK URINARY STREAM: ICD-10-CM

## 2024-10-17 DIAGNOSIS — R97.20 ELEVATED PSA: ICD-10-CM

## 2024-10-17 PROCEDURE — 99214 OFFICE O/P EST MOD 30 MIN: CPT | Performed by: UROLOGY

## 2024-10-17 PROCEDURE — 52000 CYSTOURETHROSCOPY: CPT | Performed by: UROLOGY

## 2024-10-17 RX ORDER — CIPROFLOXACIN 500 MG/1
500 TABLET, FILM COATED ORAL ONCE
Status: COMPLETED | OUTPATIENT
Start: 2024-10-17 | End: 2024-10-17

## 2024-10-17 RX ORDER — FENOFIBRATE 134 MG/1
134 CAPSULE ORAL DAILY
Qty: 90 CAPSULE | Refills: 1 | Status: SHIPPED | OUTPATIENT
Start: 2024-10-17

## 2024-10-17 RX ADMIN — CIPROFLOXACIN 500 MG: 500 TABLET, FILM COATED ORAL at 09:09:00

## 2024-10-17 NOTE — PROGRESS NOTES
[facility] Urology  Follow-Up Visit    HPI: Rob Jones is a 73 year old male presents for a follow up visit. Patient was last seen on 10/11/2023.    INTERVAL HISTORY: Former patient of Dr. Ryan.    Bladder cancer.  Elevated PSA.  BPH with LUTS.    Low risk bladder cancer diagnosed 6/2019.  SLY since then undergoing surveillance cystoscopies.    He denies gross hematuria or dysuria.  No bone pain or unintentional weight loss.  Urine cytology 8/2022 was benign.    Stable mild LUTS from BPH.  Not on any medical therapy at this point.  He reports increased nocturia where he wakes up about twice at night to urinate.  Does not limit fluids in the evening because he gets leg cramps.  Stream is fair and variable.    Previously tried Flomax without significant improvement in symptoms.    Mildly elevated PSA levels, age appropriate.  Screening PSA level 9/2023 was 4.42 ng/mL, stable compared to the past 2 years.      1.  Low Risk NMIBC (Ta LG)  2.  Elevated PSA  3.  BPH with LUTS  Patient diagnosed with low risk nonmuscle invasive urothelial bladder cancer upon TURBT performed 6/2019 with pathology revealing low-grade noninvasive UCC.  No intravesical therapy.    Has been undergoing bladder cancer surveillance without evidence of disease recurrence.    Cystoscopy 3/2022 [facility].    No family history of prostate cancer.     PSA 8/16/22 was 4.47 ng/mL with 23% free PSA. This is stable when compared to 8/2021 where PSA was 4.42 ng/mL.     - 9/2022 F/U: Urine cytology from 8/2022 was benign. BTS cystoscopy revealing no evidence of disease recurrence.    - 10/2023 F/U: BTS cystoscopy without evidence of disease recurrence.  PSA mildly elevated yet stable for the past 2 years.    - 10/2024 F/U: BTS cystoscopy [facility]LY.  Reports some worsening LUTS, specifically nocturia x 2.  Stream variable.  Did not want to pursue further treatment.  PSA to be checked by PCP.        Reviewed past medical, surgical, family, and social  history.  Reviewed med list and allergies.      REVIEW OF SYSTEMS:  Pertinent positives and negatives per HPI. A 10-point ROS was performed and is otherwise negative.       EXAM:  /70 (BP Location: Left arm, Patient Position: Sitting, Cuff Size: adult)   Pulse 57      Physical Exam  Constitutional:       Appearance: He is well-developed.   HENT:      Head: Normocephalic.   Eyes:      General: No scleral icterus.  Cardiovascular:      Rate and Rhythm: Normal rate.   Pulmonary:      Effort: Pulmonary effort is normal.   Genitourinary:     Penis: Uncircumcised. Phimosis present.    Skin:     General: Skin is warm and dry.   Neurological:      Mental Status: He is alert and oriented to person, place, and time.   Psychiatric:         Mood and Affect: Mood normal.         Behavior: Behavior normal.       PATHOLOGY:  See HPI for details.      LABS:    Component PSA, % Free PSA   Latest Ref Rng & Units % <=4.00 ng/mL   9/29/2023  4.42 (H)   8/16/2022 23 4.47 (H)   8/20/2021  4.42 (H)   8/11/2020  3.88   4/24/2019  3.07   2/6/2018  3.6   1/14/2017  2.7       IMAGING:  No results found.      UROLOGY PROCEDURE:    S CYSTOURETHROSCOPY  Informed consent was obtained for the procedure. The site was prepped and draped in the usual sterile fashion. An Olympus 16 Swazi flexible cystoscope was utilized for the procedure.    Anesthesia:  2% lidocaine gel.    Urethra: Normal without strictures or masses.    Prostate / Pelvic: Abnormal 1-2 + enlarged with lateral lobe hypertrophy and some median lobe enlargement. .    Bladder: Normal.  No tumor, stone, diverticulum, or glomerulation.  TURBT scar noted right lateral wall.    U.O's: Normal bilaterally.    Trabeculation: +1.    POST CYSTOSCOPY MEDICATIONS: sample one tablet Cipro 500 mg given to patient.    DIAGNOSIS: No evidence of bladder cancer recurrence.  Mild to moderate BPH..       IMPRESSION:  73 year old male with history of low risk nonmuscle invasive urothelial bladder  cancer diagnosed 6/2019 upon TURBT.    No evidence of disease recurrence upon surveillance cystoscopies.  Cytology benign.    BTS cystoscopy today without evidence of disease recurrence.  Findings reviewed with patient.  Given 5 years since diagnosis without evidence of disease recurrence, may discontinue further surveillance cystoscopies at this point per NCCN guidelines.      Further evaluation and surveillance would be as clinically indicated if patient has gross or microscopic hematuria.    Separate, clinically significant, and clearly identifiable discussion held with the patient regarding his elevated PSA levels and BPH with LUTS.    PSA levels mildly elevated however acceptable for patient's age.  Levels have been relatively stable over the past 2 years.  Recommend continued routine prostate cancer screening with annual PSA until the age of 73.  PSA will be checked by his PCP during annual physical.  Patient agreeable.    He has BPH with mild LUTS.  Currently not on any medical therapy.  Reports some worsening nocturia.  Discussed with patient.  Discussed limiting fluids in the evening however he states he has to drink water because he gets leg cramps.  Options reviewed.  Previously tried tamsulosin without results.  Discussed trial of a 5 alpha reductase inhibitor, or daily oral 5 PDE inhibitor or considering minimally invasive surgical treatment options for BPH.      Benefits, risks, side effects, alternatives reviewed.  Patient would like to continue watchful waiting for the time being.  He will return if his symptoms worsen.      All questions answered.      PLAN:  1.  No further bladder tumor surveillance cystoscopies given 5 years since initial bladder cancer diagnosis without evidence of recurrence.    Patient should return for follow-up if he develops gross hematuria or is found to have microscopic hematuria on routine UA by PCP.    2.  Continue routine prostate cancer screening until the age of 75,  usually ordered by PCP during annual physical.    3.  Patient wishes to continue watchful waiting regarding his BPH with LUTS.  He will return for follow-up if symptoms worsen.    Follow-up as needed.      David Kennedy MD  10/17/2024

## 2024-10-17 NOTE — TELEPHONE ENCOUNTER
Endocrine refill protocol for lipid lowering medications    Protocol Criteria:  PASSED Reason: N/A    If all below requirements are met, send a 90-day supply with 1 refill per provider protocol.    Verify appointment with Endocrinology completed in the last 6 months or scheduled in the next 3 months.  Lipid panel must have been completed in the last 12 months   ALT result below 80  LDL result below 130    Last completed office visit:6/3/2024 Rochelle Baker MD   Next scheduled Follow up:   Future Appointments   Date Time Provider Department Center   11/4/2024  9:30 AM Rochelle Baker MD [facility]       [facility]       [facility]       [facility] [place]      Last Lipid panel date: Cholesterol: 184, done on 5/31/2024.  HDL Cholesterol: 38, done on 5/31/2024.  TriGlycerides 320, done on 5/31/2024.  LDL Cholesterol: 93, done on 5/31/2024.     Last ALT result: Last ALT was 49 done on 9/29/2023.  Last AST was 17 done on 9/29/2023.

## 2024-10-17 NOTE — TELEPHONE ENCOUNTER
Current Outpatient Medications   Medication Sig Dispense Refill                                              Fenofibrate 134 MG Oral Cap Take 134 mg by mouth daily. 90 capsule 1

## 2024-11-04 ENCOUNTER — OFFICE VISIT (OUTPATIENT)
Dept: ENDOCRINOLOGY CLINIC | Facility: CLINIC | Age: 74
End: 2024-11-04
Payer: MEDICARE

## 2024-11-04 VITALS
WEIGHT: 188 LBS | HEART RATE: 54 BPM | HEIGHT: 68 IN | DIASTOLIC BLOOD PRESSURE: 82 MMHG | SYSTOLIC BLOOD PRESSURE: 140 MMHG | BODY MASS INDEX: 28.49 KG/M2

## 2024-11-04 DIAGNOSIS — E11.69 TYPE 2 DIABETES MELLITUS WITH OTHER SPECIFIED COMPLICATION, WITHOUT LONG-TERM CURRENT USE OF INSULIN (HCC): Primary | ICD-10-CM

## 2024-11-04 DIAGNOSIS — E03.8 SUBCLINICAL HYPOTHYROIDISM: ICD-10-CM

## 2024-11-04 DIAGNOSIS — E78.5 DYSLIPIDEMIA: ICD-10-CM

## 2024-11-04 LAB
GLUCOSE BLOOD: 175
HEMOGLOBIN A1C: 7.4 % (ref 4.3–5.6)
TEST STRIP LOT #: NORMAL NUMERIC

## 2024-11-04 PROCEDURE — 82947 ASSAY GLUCOSE BLOOD QUANT: CPT | Performed by: INTERNAL MEDICINE

## 2024-11-04 PROCEDURE — 99213 OFFICE O/P EST LOW 20 MIN: CPT | Performed by: INTERNAL MEDICINE

## 2024-11-04 PROCEDURE — 83036 HEMOGLOBIN GLYCOSYLATED A1C: CPT | Performed by: INTERNAL MEDICINE

## 2024-11-04 RX ORDER — GLIMEPIRIDE 1 MG/1
1 TABLET ORAL
Qty: 90 TABLET | Refills: 0 | Status: SHIPPED | OUTPATIENT
Start: 2024-11-04

## 2024-11-04 NOTE — PROGRESS NOTES
Return Office Visit     CHIEF COMPLAINT:    DM    Dyslipidemia  Subclinical Hypothyroidism      HISTORY OF PRESENT ILLNESS:  Rob Jones is a 73 year old male who presents for follow up for DM.       DM HISTORY:  Diagnosed in 2000       HISTORY OF DIABETES COMPLICATIONS: :  History of Retinopathy: No - last eye exam:  June 2023, he will make an appointment soon with Dr Guaman   History of Neuropathy: No  History of Nephropathy: No    ASSOCIATED COMPLICATIONS:    HTN: Yes  Hyperlipidemia: Yes  Coronary Artery Disease:  No  Cerebrovascular Disease: No      HOME GLUCOSE READINGS:   Fasting/ pre dinner 130-180  Noted high BG pre lunch at times in the early 200s       CURRENT DIABETIC MEDICATIONS INCLUDE:    MTF  mg TID, had SE with 4 tabs daily   Januvia 100 mg daily    MEALS:  Three meals a day  Dietary compliance is moderate      EXERCISE:  no      CURRENT MEDICATION:    Current Outpatient Medications   Medication Sig Dispense Refill    glimepiride 1 MG Oral Tab Take 1 tablet (1 mg total) by mouth daily with breakfast. 90 tablet 0    Fenofibrate 134 MG Oral Cap Take 134 mg by mouth daily. 90 capsule 1    levothyroxine 50 MCG Oral Tab Take 1 tablet (50 mcg total) by mouth before breakfast. 90 tablet 0    METFORMIN  MG Oral Tablet 24 Hr TAKE 2 TABLETS(1000 MG) BY MOUTH TWICE DAILY WITH MEALS (Patient taking differently: Take 2 tablets (1,000 mg total) by mouth 2 (two) times daily with meals. Taking 3 tablets daily) 360 tablet 1    JANUVIA 100 MG Oral Tab TAKE 1 TABLET DAILY 90 tablet 3    LISINOPRIL 40 MG Oral Tab TAKE 1 TABLET(40 MG) BY MOUTH DAILY 90 tablet 1    PRAVASTATIN 40 MG Oral Tab TAKE 1 TABLET(40 MG) BY MOUTH EVERY NIGHT 90 tablet 1    hydrALAZINE 10 MG Oral Tab Take 1 tablet (10 mg total) by mouth 4 (four) times daily. 360 tablet 0    Icosapent Ethyl 1 g Oral Cap Take 1 capsule by mouth 2 (two) times daily. 180 capsule 3    levothyroxine 25 MCG Oral Tab Take 1 tablet (25 mcg total) by  mouth before breakfast. 90 tablet 1    latanoprost 0.005 % Ophthalmic Solution INSTILL 1 DROP INTO BOTH EYES EVERY EVEING 3 each 3    acetaminophen 500 MG Oral Tab Take 1 tablet (500 mg total) by mouth every 6 (six) hours as needed for Pain. Takes one at night time. Pt stts he takes for sleep aid      Glucose Blood (FREESTYLE LITE TEST) In Vitro Strip Use test strips to check blood sugar 3 times per day as directed. 300 strip 1    FreeStyle Lancets Does not apply Misc Use lancets to check blood sugar 3 times per day as directed. 300 each 1    amLODIPine 5 MG Oral Tab Take 1 tablet (5 mg total) by mouth 2 (two) times daily. 180 tablet 3    atenolol 50 MG Oral Tab Take 1 tablet (50 mg total) by mouth 2 (two) times daily. 180 tablet 3    meclizine 25 MG Oral Tab Take 1 tablet (25 mg total) by mouth 3 (three) times daily as needed. (Patient not taking: Reported on 10/17/2024) 15 tablet 0    ketoconazole 2 % External Cream Apply to feet 3 times weekly 60 g 11         ALLERGY:  No Known Allergies      PAST MEDICAL, SOCIAL AND FAMILY HISTORY:  See past medical history marked as reviewed.  See past surgical history marked as reviewed.  See past family history marked as reviewed.  See past social history marked as reviewed.    ASSESSMENTS:     REVIEW OF SYSTEMS:  Constitutional: Negative for: weight change, fever, fatigue, cold/heat intolerance  Eyes: Negative for:  Visual changes, proptosis, blurring  ENT: Negative for:  dysphagia, neck swelling, dysphonia  Respiratory: Negative for:  dyspnea, cough  Cardiovascular: Negative for:  chest pain, palpitations, orthopnea  GI: Negative for:  abdominal pain, nausea, vomiting, diarrhea, constipation, bleeding  Neurology: Negative for: headache, numbness, weakness  Genito-Urinary: Negative for: dysuria, frequency  Psychiatric: Negative for:  depression, anxiety  Hematology/Lymphatics: Negative for: bruising, lower extremity edema  Endocrine: Negative for: polyuria, polydypsia  Skin:  Negative for: rash, blister, cellulitis,       PHYSICAL EXAM:   Vitals reviewed    General Appearance:  alert, well developed, in no acute distress  Head: Atraumatic  Eyes:  normal conjunctivae, sclera., normal sclera and normal pupils  Throat/Neck: normal sound to voice. Normal hearing, normal speech  Respiratory:  Speaking in full sentences, non-labored. no increased work of breathing, no audible wheezing    Neuro: motor grossly intact, moving all extremities without difficulty  Psychiatric:  oriented to time, self, and place  Extremities: Nov 2024   Bilateral barefoot skin diabetic exam is normal, visualized feet and the appearance is normal.  Bilateral monofilament/sensation of both feet is normal.  Pulsation pedal pulse exam of both lower legs/feet is normal as well.              DATA:         A1c 7.4 % ( 11/2024)  Reviewed labs    ASSESSMENT AND PLAN:    1. Type 2 DM:      Plan:  Discussed the pathogenesis, natural course of diabetes. Patient understands the importance of glycemic control and the implications of uncontrolled diabetes including Diabetic ketoacidosis and various micro vascular and macrovascular complications.      a). Medications:    Start amaryl 1 mg daily with BF   Take with food  Reviewed SE of hypoglycemia    C/w MTF ER 1000 mg TID with meals  Take with food    C/w Januvia 100 --> but decrease to 50 mg daily  SE reviewed  Monitor and send BG as discussed      SGLT2 inhibitors and actos: will avoid given h/o bladder cancer    Reviewed exercise  Continue to work on low carb diet        b). Recent  MA normal, will monitor. Good Bg and BP control  c). Instructed on importance of annual eye exams.   d). Foot exam: Daily feet exam explained  e). BG log maintainence explained in great detail, to get log and glucometer on next visit.  f). Life style changes discussed  g). Hypoglycemia recognition and management discussed    2.Dyslipidemia  A) Discussed lifestyle modifications including reductions in  dietary total and saturated fat, weight loss, aerobic exercise, and eating a diet rich in fruits and vegetables.  B) c/w simvastatin.  C) Fasting lipid panel reviewed  LDL cholesterol is okay   However, his TG level is elevated   Discussed that when these are over 500, they can increase risk of pancreatitis  C/w fenofibrate and monitor labs in addition to a low fat diet and daily exercise  Fenofibrate 134 mg daily   Fasting lipid panel  reviewed  3. BP is normal  4. Subclinical Hypothyroidism  On LT4 50 mcg daily  Reports compliance and takes it empty stomach  CPM  TSH was slightly high on last check  LT4 : increase to 50 mcg daily --> he will like to take two tablets for the 25 mcg dose since he has a lot of medication at home  TSH with reflex 6-8 weeks      RTC in 4 months.   Check and call with sugars as discussed

## 2024-11-13 ENCOUNTER — IMMUNIZATION (OUTPATIENT)
Dept: LAB | Age: 74
End: 2024-11-13
Attending: EMERGENCY MEDICINE
Payer: MEDICARE

## 2024-11-13 DIAGNOSIS — Z23 NEED FOR VACCINATION: Primary | ICD-10-CM

## 2024-11-13 PROCEDURE — 90662 IIV NO PRSV INCREASED AG IM: CPT

## 2024-11-13 PROCEDURE — 90471 IMMUNIZATION ADMIN: CPT

## 2024-11-13 PROCEDURE — 90480 ADMN SARSCOV2 VAC 1/ONLY CMP: CPT

## 2024-11-18 ENCOUNTER — OFFICE VISIT (OUTPATIENT)
Dept: INTERNAL MEDICINE CLINIC | Facility: CLINIC | Age: 74
End: 2024-11-18
Payer: MEDICARE

## 2024-11-18 ENCOUNTER — NURSE TRIAGE (OUTPATIENT)
Dept: INTERNAL MEDICINE CLINIC | Facility: CLINIC | Age: 74
End: 2024-11-18

## 2024-11-18 VITALS
OXYGEN SATURATION: 98 % | SYSTOLIC BLOOD PRESSURE: 124 MMHG | HEIGHT: 68 IN | BODY MASS INDEX: 28.23 KG/M2 | WEIGHT: 186.25 LBS | DIASTOLIC BLOOD PRESSURE: 60 MMHG | HEART RATE: 57 BPM

## 2024-11-18 DIAGNOSIS — R05.1 ACUTE COUGH: ICD-10-CM

## 2024-11-18 DIAGNOSIS — K64.8 INTERNAL HEMORRHOID, BLEEDING: Primary | ICD-10-CM

## 2024-11-18 RX ORDER — BENZONATATE 100 MG/1
100 CAPSULE ORAL 3 TIMES DAILY PRN
Qty: 18 CAPSULE | Refills: 0 | Status: SHIPPED | OUTPATIENT
Start: 2024-11-18

## 2024-11-18 NOTE — PROGRESS NOTES
Subjective:   Rob Jones is a 73 year old male who presents for Rectal Problem (Pt c/o rectal bleeding this morning. )     Presents for evaluation of blood from rectum this morning.  Reports having a soft bowel movement prior to getting in the shower.  When he got out of the shower, he noted there was a drip of blood on floor. He then wiped with toilet paper, saw scant blood on tissue. Bleeding stopped after wiping a couple of times.  States this is never happened to him in the past.  Reports his last colonoscopy showed some internal hemorrhoids.  No external hemorrhoids that he is aware of.  He reports his bowel movements are always soft/loose related to taking metformin.  He does not have any issues with straining, constipation.  He is not taking any blood thinners, has not had any recent prolonged sitting.  No pain, burning, itching in the rectal area.  No urinary complaints.  No dizziness, shortness of breath.    He has had viral URI symptoms with postnasal drip and reports frequent and prolonged coughing every day for the last week or so.  His wife is sick with the same.  Sputum is clear.  No fevers, shortness of breath, wheezing.      History/Other:    Chief Complaint Reviewed and Verified  Nursing Notes Reviewed and   Verified  Tobacco Reviewed  Allergies Reviewed  Medications Reviewed    Social History Reviewed         Tobacco:  He has never smoked tobacco.    Current Outpatient Medications   Medication Sig Dispense Refill    benzonatate 100 MG Oral Cap Take 1 capsule (100 mg total) by mouth 3 (three) times daily as needed for cough. 18 capsule 0    glimepiride 1 MG Oral Tab Take 1 tablet (1 mg total) by mouth daily with breakfast. 90 tablet 0    Fenofibrate 134 MG Oral Cap Take 134 mg by mouth daily. 90 capsule 1    LISINOPRIL 40 MG Oral Tab TAKE 1 TABLET(40 MG) BY MOUTH DAILY 90 tablet 1    PRAVASTATIN 40 MG Oral Tab TAKE 1 TABLET(40 MG) BY MOUTH EVERY NIGHT 90 tablet 1    hydrALAZINE 10 MG  Oral Tab Take 1 tablet (10 mg total) by mouth 4 (four) times daily. 360 tablet 0    levothyroxine 50 MCG Oral Tab Take 1 tablet (50 mcg total) by mouth before breakfast. 90 tablet 0    METFORMIN  MG Oral Tablet 24 Hr TAKE 2 TABLETS(1000 MG) BY MOUTH TWICE DAILY WITH MEALS (Patient taking differently: Take 2 tablets (1,000 mg total) by mouth 2 (two) times daily with meals. Taking 3 tablets daily) 360 tablet 1    Icosapent Ethyl 1 g Oral Cap Take 1 capsule by mouth 2 (two) times daily. 180 capsule 3    latanoprost 0.005 % Ophthalmic Solution INSTILL 1 DROP INTO BOTH EYES EVERY EVEING 3 each 3    acetaminophen 500 MG Oral Tab Take 1 tablet (500 mg total) by mouth every 6 (six) hours as needed for Pain. Takes one at night time. Pt stts he takes for sleep aid      Glucose Blood (FREESTYLE LITE TEST) In Vitro Strip Use test strips to check blood sugar 3 times per day as directed. 300 strip 1    FreeStyle Lancets Does not apply Misc Use lancets to check blood sugar 3 times per day as directed. 300 each 1    amLODIPine 5 MG Oral Tab Take 1 tablet (5 mg total) by mouth 2 (two) times daily. 180 tablet 3    atenolol 50 MG Oral Tab Take 1 tablet (50 mg total) by mouth 2 (two) times daily. 180 tablet 3    JANUVIA 100 MG Oral Tab TAKE 1 TABLET DAILY 90 tablet 3    meclizine 25 MG Oral Tab Take 1 tablet (25 mg total) by mouth 3 (three) times daily as needed. 15 tablet 0    levothyroxine 25 MCG Oral Tab Take 1 tablet (25 mcg total) by mouth before breakfast. 90 tablet 1    ketoconazole 2 % External Cream Apply to feet 3 times weekly 60 g 11         Review of Systems:  Review of Systems  10 point review of systems otherwise negative with the exception of HPI and assessment and plan.      Objective:   /60 (BP Location: Left arm, Patient Position: Sitting, Cuff Size: adult)   Pulse 57   Ht 5' 8\" (1.727 m)   Wt 186 lb 4 oz (84.5 kg)   SpO2 98%   BMI 28.32 kg/m²  Estimated body mass index is 28.32 kg/m² as calculated  from the following:    Height as of this encounter: 5' 8\" (1.727 m).    Weight as of this encounter: 186 lb 4 oz (84.5 kg).    Physical Exam  Vitals reviewed.   Constitutional:       General: He is not in acute distress.     Appearance: Normal appearance. He is not ill-appearing.   HENT:      Nose: Congestion and rhinorrhea present.      Mouth/Throat:      Pharynx: Postnasal drip present.   Cardiovascular:      Rate and Rhythm: Normal rate and regular rhythm.      Heart sounds: Normal heart sounds.   Pulmonary:      Effort: Pulmonary effort is normal.      Breath sounds: Normal breath sounds.   Abdominal:      General: Abdomen is flat. Bowel sounds are normal.      Palpations: Abdomen is soft.      Tenderness: There is no abdominal tenderness.   Genitourinary:     Rectum: Internal hemorrhoid present. No external hemorrhoid.          Comments: Soft, mildly tender, movable approx nickel-sized bump felt just inside right side of rectum, scant bright red blood after withdrawing finger.  Neurological:      Mental Status: He is alert.         Assessment & Plan:   1. Internal hemorrhoid, bleeding (Primary)  -Physical exam consistent with small internal/borderline external hemorrhoid, likely exacerbated by constant coughing over the past week.   -Continue to monitor closely at home, and return with any worsening, including bleeding that does not stop after wiping, passage of clots, dizziness/weakness.  2. Acute cough  Other orders  - Benzonatate; Take 1 capsule (100 mg total) by mouth 3 (three) times daily as needed for cough.  Dispense: 18 capsule; Refill: 0  - Take benzonatate 3 times daily for cough suppression.  May cause drowsiness.  - Take Flonase 1 puff each nostril morning and night for the next several days.        Return for New/worsening bleeding accompanied by dizziness/lightheadedness..    BRYON Lancaster, 11/18/2024, 9:34 AM

## 2024-11-18 NOTE — TELEPHONE ENCOUNTER
Please reply to pool: EM RN TRIAGE  Action Requested: Summary for Provider     []  Critical Lab, Recommendations Needed  [] Need Additional Advice  [x]   FYI    []   Need Orders  [] Need Medications Sent to Pharmacy  []  Other     SUMMARY: Patient noticed some spots of bright red blood on his towel this morning.  Stool is soft.  Denies rectal or abdominal pain.  Denies black, tarry stool.  Denies dizziness or lightheadedness.  Believes they found hemorrhoids on his last colonoscopy.  Acute visit scheduled today.    Reason for call: Rectal Bleeding  Onset: Data Unavailable                       Reason for Disposition   MILD rectal bleeding (more than just a few drops or streaks)    Protocols used: Rectal Bleeding-A-OH

## 2024-11-18 NOTE — PATIENT INSTRUCTIONS
Flonase (fluticasone) 1 spray each nostril 2 times a day.    Benzonatate 3 times a day as needed for cough.    Humidifier in your bedroom.    Monitor any further blood with wiping.  You should be evaluated for bleeding that worsens, is continuous, and any dizziness/lightheadedness.

## 2024-11-20 ENCOUNTER — OFFICE VISIT (OUTPATIENT)
Dept: DERMATOLOGY CLINIC | Facility: CLINIC | Age: 74
End: 2024-11-20
Payer: MEDICARE

## 2024-11-20 DIAGNOSIS — D18.01 CHERRY ANGIOMA: ICD-10-CM

## 2024-11-20 DIAGNOSIS — L82.1 SEBORRHEIC KERATOSES: Primary | ICD-10-CM

## 2024-11-20 DIAGNOSIS — D22.9 MULTIPLE BENIGN NEVI: ICD-10-CM

## 2024-11-20 DIAGNOSIS — L57.0 ACTINIC KERATOSIS: ICD-10-CM

## 2024-11-20 PROCEDURE — 17000 DESTRUCT PREMALG LESION: CPT | Performed by: STUDENT IN AN ORGANIZED HEALTH CARE EDUCATION/TRAINING PROGRAM

## 2024-11-20 PROCEDURE — 99214 OFFICE O/P EST MOD 30 MIN: CPT | Performed by: STUDENT IN AN ORGANIZED HEALTH CARE EDUCATION/TRAINING PROGRAM

## 2024-11-24 ENCOUNTER — PATIENT MESSAGE (OUTPATIENT)
Dept: ENDOCRINOLOGY CLINIC | Facility: CLINIC | Age: 74
End: 2024-11-24

## 2024-11-25 NOTE — TELEPHONE ENCOUNTER
Endocrine refill protocol for medications for hypothyroidism and hyperthyroidism    Protocol Criteria:  PASSED Reason: N/A    If all below requirements are met, send a 90-day supply with 1 refill per provider protocol.    Verify appointment with Endocrinology completed in the last 12 months or scheduled in the next 6 months.    Normal TSH result in the past 12 months   Review recent telephone encounters and mychart communications with patient to ensure a dose change has not occurred since last office visit that was not updated in the medication history list     Last completed office visit:11/4/2024 Rochelle Baker MD   Next scheduled Follow up:   Future Appointments   Date Time Provider Department [facility]       [facility]   3/3/2025  9:30 AM [facility]                    Last TSH result:   TSH   Date Value Ref Range Status   07/31/2024 2.954 0.550 - 4.780 mIU/mL Final

## 2024-11-26 RX ORDER — LEVOTHYROXINE SODIUM 50 UG/1
50 TABLET ORAL
Qty: 90 TABLET | Refills: 1 | Status: SHIPPED | OUTPATIENT
Start: 2024-11-26

## 2024-11-26 NOTE — ADDENDUM NOTE
Addended by: Britton Hazel on: 3/16/2018 02:42 PM     Modules accepted: Orders
<--- Click to Launch ICDx for PreOp, PostOp and Procedure

## 2024-12-11 ENCOUNTER — OFFICE VISIT (OUTPATIENT)
Dept: INTERNAL MEDICINE CLINIC | Facility: CLINIC | Age: 74
End: 2024-12-11

## 2024-12-11 VITALS
BODY MASS INDEX: 28.49 KG/M2 | HEIGHT: 68 IN | TEMPERATURE: 98 F | WEIGHT: 188 LBS | HEART RATE: 62 BPM | DIASTOLIC BLOOD PRESSURE: 68 MMHG | SYSTOLIC BLOOD PRESSURE: 136 MMHG | RESPIRATION RATE: 20 BRPM

## 2024-12-11 DIAGNOSIS — Z12.5 SCREENING FOR PROSTATE CANCER: ICD-10-CM

## 2024-12-11 DIAGNOSIS — E11.9 TYPE 2 DIABETES MELLITUS WITHOUT COMPLICATION, UNSPECIFIED WHETHER LONG TERM INSULIN USE (HCC): ICD-10-CM

## 2024-12-11 DIAGNOSIS — I10 ESSENTIAL HYPERTENSION: ICD-10-CM

## 2024-12-11 DIAGNOSIS — E78.5 HYPERLIPIDEMIA, UNSPECIFIED HYPERLIPIDEMIA TYPE: ICD-10-CM

## 2024-12-11 DIAGNOSIS — Z85.51 HISTORY OF BLADDER CANCER: ICD-10-CM

## 2024-12-11 DIAGNOSIS — Z00.00 ENCOUNTER FOR ANNUAL HEALTH EXAMINATION: Primary | ICD-10-CM

## 2024-12-11 DIAGNOSIS — R97.20 ELEVATED PSA: ICD-10-CM

## 2024-12-11 DIAGNOSIS — G31.84 MCI (MILD COGNITIVE IMPAIRMENT): ICD-10-CM

## 2024-12-11 DIAGNOSIS — I65.29 CAROTID ATHEROSCLEROSIS, UNSPECIFIED LATERALITY: ICD-10-CM

## 2024-12-11 DIAGNOSIS — H93.19 TINNITUS, UNSPECIFIED LATERALITY: ICD-10-CM

## 2024-12-11 RX ORDER — HYDRALAZINE HYDROCHLORIDE 25 MG/1
25 TABLET, FILM COATED ORAL 3 TIMES DAILY
Qty: 270 TABLET | Refills: 1 | Status: SHIPPED | OUTPATIENT
Start: 2024-12-11

## 2024-12-11 NOTE — PROGRESS NOTES
Subjective:   Rob Jones is a 74 year old male who presents for a Medicare Subsequent Annual Wellness visit (Pt already had Initial Annual Wellness) and scheduled follow up of multiple significant but stable problems.     Patient is here requesting Medicare annual wellness visit and follow-up on chronic medical issues as listed below.  Last seen in the office on June 21.  Blood pressure low but borderline at home in the office.  We increased hydralazine from 10 mg 3 times a day up to 4 times a day.  Since that time patient is seen dermatology, endocrinology, and different internal medicine doctor recently for hemorrhoid.  Last A1c in November 4 was 7.4. Endo added glimepiride 1 mg qAM, and decreased Januvia from 100 to 50mg after dinner.  He is due for some blood work although endocrinology checks their tests frequently.  Current medications reviewed.  Health maintenance and vaccination status reviewed.  Advanced directives reviewed.    Patient does check sugar at home. It has been running ; lower at dinner time. Patient does check blood pressure at home. It has been running 124-168/--. BP was 154/74 this morning at home. Diet is pretty standard and average. For exercise, he is active and on his feet a lot. He is a part time  which keeps him on his feet. The blood in the stool has resolved. Recently had his 5 year cystoscopy for the bladder cancer; it all looked good.     He is having memory issues. Sometimes he forgets to take his meds. His short term memory is very poor right now. Has hearing issues with tinnitus and decreased hearing. History of Mcintyre Lopez years ago.     No tobacco. EtOH is one a month. Adv directives in place.       History/Other:   Fall Risk Assessment:   He has been screened for Falls and is low risk.      Cognitive Assessment:   He had a completely normal cognitive assessment - see flowsheet entries     Functional Ability/Status:   Rob Jones has some  abnormal functions as listed below:  He has Hearing problems based on screening of functional status.He has Vision problems based on screening of functional status. He has problems with Memory based on screening of functional status.       Depression Screening (PHQ):  PHQ-2 SCORE: 0  , done 12/11/2024   If you checked off any problems, how difficult have these problems made it for you to do your work, take care of things at home, or get along with other people?: Not difficult at all    Last [place] Suicide Screening on 12/11/2024 was No Risk.          Advanced Directives:   He does have a Living Will but we do NOT have it on file in Epic.    He has a Power of  for Health Care on file in [facility].  Discussed Advance Care Planning with patient (and family/surrogate if present). Standard forms made available to patient in After Visit Summary.      Patient Active Problem List   Diagnosis    Sensorineural hearing loss, bilateral    Type 2 diabetes mellitus without retinopathy (HCC)    Essential hypertension    Hyperlipidemia    History of kidney stones    Bladder tumor    Primary open angle glaucoma of both eyes, mild stage    Age-related nuclear cataract of both eyes    Floaters in visual field, bilateral    Blepharitis of upper and lower eyelids of both eyes    Myopia with astigmatism and presbyopia, bilateral    Polyp of colon    Type 2 diabetes mellitus with other specified complication, unspecified whether long term insulin use (HCC)     Allergies:  He has No Known Allergies.    Current Medications:  Outpatient Medications Marked as Taking for the 12/11/24 encounter (Office Visit) with Floyd Coelho MD   Medication Sig    LEVOTHYROXINE 50 MCG Oral Tab TAKE 1 TABLET(50 MCG) BY MOUTH BEFORE BREAKFAST    SITagliptin Phosphate 50 MG Oral Tab Take 1 tablet (50 mg total) by mouth daily.    benzonatate 100 MG Oral Cap Take 1 capsule (100 mg total) by mouth 3 (three) times daily as needed for cough.    glimepiride 1  MG Oral Tab Take 1 tablet (1 mg total) by mouth daily with breakfast.    Fenofibrate 134 MG Oral Cap Take 134 mg by mouth daily.    LISINOPRIL 40 MG Oral Tab TAKE 1 TABLET(40 MG) BY MOUTH DAILY    PRAVASTATIN 40 MG Oral Tab TAKE 1 TABLET(40 MG) BY MOUTH EVERY NIGHT    hydrALAZINE 10 MG Oral Tab Take 1 tablet (10 mg total) by mouth 4 (four) times daily.    METFORMIN  MG Oral Tablet 24 Hr TAKE 2 TABLETS(1000 MG) BY MOUTH TWICE DAILY WITH MEALS (Patient taking differently: Take 2 tablets (1,000 mg total) by mouth 2 (two) times daily with meals. Taking 3 tablets daily)    Icosapent Ethyl 1 g Oral Cap Take 1 capsule by mouth 2 (two) times daily.    latanoprost 0.005 % Ophthalmic Solution INSTILL 1 DROP INTO BOTH EYES EVERY EVEING    acetaminophen 500 MG Oral Tab Take 1 tablet (500 mg total) by mouth every 6 (six) hours as needed for Pain. Takes one at night time. Pt stts he takes for sleep aid    Glucose Blood (FREESTYLE LITE TEST) In Vitro Strip Use test strips to check blood sugar 3 times per day as directed.    FreeStyle Lancets Does not apply Misc Use lancets to check blood sugar 3 times per day as directed.    amLODIPine 5 MG Oral Tab Take 1 tablet (5 mg total) by mouth 2 (two) times daily.    atenolol 50 MG Oral Tab Take 1 tablet (50 mg total) by mouth 2 (two) times daily.    meclizine 25 MG Oral Tab Take 1 tablet (25 mg total) by mouth 3 (three) times daily as needed.       Medical History:  He  has a past medical history of Asthma (HCC), BCC (basal cell carcinoma) (2021), Bell's palsy, Calculus of kidney, Cancer (HCC) (May, 2019), Colon polyp (1996), Diabetes (HCC), Essential hypertension, Glaucoma (~2015), High blood pressure, High cholesterol, Hyperlipidemia, Kidney stone, Pyloric stenosis (HCC), Gavino Lopez syndrome (geniculate herpes zoster) (2016), Screen for colon cancer (2021), and Skin cancer (2021).  Surgical History:  He  has a past surgical history that includes tonsillectomy; colonoscopy;  colonoscopy (1996); and colonoscopy (N/A, 5/19/2021).   Family History:  His family history includes Diabetes in his sister; Other in his father and mother.  Social History:  He  reports that he has never smoked. He has never been exposed to tobacco smoke. He has never used smokeless tobacco. He reports current alcohol use. He reports that he does not use drugs.    Tobacco:  He has never smoked tobacco.    CAGE Alcohol Screen:   CAGE screening score of 0 on 12/11/2024, showing low risk of alcohol abuse.      Patient Care Team:  Floyd Coelho MD as PCP - General (Internal Medicine)  Murtaza Guajardo MD (OTOLARYNGOLOGY)    Review of Systems       Objective:   Physical Exam  Constitutional:       Appearance: Normal appearance. He is well-developed.   HENT:      Right Ear: Tympanic membrane and ear canal normal.      Left Ear: Tympanic membrane and ear canal normal.      Nose: Nose normal.      Mouth/Throat:      Pharynx: No oropharyngeal exudate or posterior oropharyngeal erythema.   Eyes:      Conjunctiva/sclera: Conjunctivae normal.      Pupils: Pupils are equal, round, and reactive to light.   Neck:      Thyroid: No thyromegaly.      Vascular: No carotid bruit.   Cardiovascular:      Rate and Rhythm: Normal rate and regular rhythm.      Pulses: Normal pulses.      Heart sounds: Normal heart sounds. No murmur heard.  Pulmonary:      Effort: Pulmonary effort is normal.      Breath sounds: Normal breath sounds. No wheezing or rales.   Abdominal:      General: Bowel sounds are normal.      Palpations: Abdomen is soft. There is no mass.      Tenderness: There is no abdominal tenderness.      Hernia: There is no hernia in the left inguinal area.   Genitourinary:     Penis: Normal and uncircumcised.       Testes: Normal.   Musculoskeletal:      Right lower leg: No edema.      Left lower leg: No edema.   Lymphadenopathy:      Cervical: No cervical adenopathy.   Skin:     General: Skin is warm and dry.      Findings: No  rash.   Neurological:      General: No focal deficit present.      Mental Status: He is alert.      Cranial Nerves: No cranial nerve deficit.      Coordination: Coordination normal.   Psychiatric:         Mood and Affect: Mood normal.         Behavior: Behavior normal.          /68 (BP Location: Left arm, Patient Position: Sitting, Cuff Size: large)   Pulse 62   Temp 98 °F (36.7 °C) (Other)   Resp 20   Ht 5' 8\" (1.727 m)   Wt 188 lb (85.3 kg)   BMI 28.59 kg/m²  Estimated body mass index is 28.59 kg/m² as calculated from the following:    Height as of this encounter: 5' 8\" (1.727 m).    Weight as of this encounter: 188 lb (85.3 kg).    Medicare Hearing Assessment:   Hearing Screening    Screening Method: Questionnaire  I have a problem hearing over the telephone: No I have trouble following the conversations when two or more people are talking at the same time: No   I have trouble understanding things on the TV: No I have to strain to understand conversations: No   I have to worry about missing the telephone ring or doorbell: No I have trouble hearing conversations in a noisy background such as a crowded room or restaurant: No   I get confused about where sounds come from: No I misunderstand some words in a sentence and need to ask people to repeat themselves: No   I especially have trouble understanding the speech of women and children: No I have trouble understanding the speaker in a large room such as at a meeting or place of Yazdanism: No   Many people I talk to seem to mumble (or don't speak clearly): No People get annoyed because I misunderstand what they say: No   I misunderstand what others are saying and make inappropriate responses: No I avoid social activities because I cannot hear well and fear I will reply improperly: No   Family members and friends have told me they think I may have hearing loss: No             Visual Acuity:   Right Eye Visual Acuity: Corrected Right Eye Chart Acuity: 20/30    Left Eye Visual Acuity: Corrected Left Eye Chart Acuity: 20/30   Both Eyes Visual Acuity: Corrected Both Eyes Chart Acuity: 20/30   Able To Tolerate Visual Acuity: Yes        Assessment & Plan:   Rob Jones is a 74 year old male who presents for a Medicare Assessment.     1. Encounter for annual health examination  Physical exam showed borderline blood pressure.  Active issues as below.  Health maintenance issues reviewed.  Advanced directives in place.  Encouraged healthy diet and regular exercise.  Follow-up in 6 months unless he has health issues.    2. Essential hypertension  Blood pressure is borderline in the office today.  At home it is quite variable.  We will change the dose of the hydralazine from 10 mg 4 times a day over to 25 mg 3 times a day.  He may adjust the 10 mg pills at home as he has a lot of those before he switches over the 25 mg.  In addition, check fasting blood work and contact patient with results.  - CBC With Differential With Platelet; Future  - Comp Metabolic Panel (14); Future  - Assay, Thyroid Stim Hormone; Future  - Lipid Panel; Future  - Vitamin B12; Future    3. Type 2 diabetes mellitus without complication, unspecified whether long term insulin use (HCC)  Recently started on glimepiride.  Follow-up with endocrinology.  Work on diet and exercise.    4. Hyperlipidemia, unspecified hyperlipidemia type  Check lipid profile.  Adjust medications if needed.  Work on diet and exercise.    5. Carotid atherosclerosis, unspecified laterality  Asymptomatic.  Continue to monitor.    6. Elevated PSA  Check PSA.    7. MCI (mild cognitive impairment)  Patient feels like things are getting somewhat worse.  He seems okay on exam today but no extensive mental status testing was done.  Refer to neurology for evaluation.  - Neuro Referral - [facility] ([place])    8. History of bladder cancer  He just had a 5-year cystoscopy and it was normal.  No evidence of recurrence.    9. Screening for  prostate cancer  Check PSA.  - PSA Total, Screen; Future    10. Tinnitus, unspecified laterality  Patient with tinnitus as well as some hearing loss.  He will be seeing ENT sometime soon and can set up something with audiology for evaluation.      The patient indicates understanding of these issues and agrees to the plan.  Reinforced healthy diet, lifestyle, and exercise.      No follow-ups on file.     Floyd Coelho MD, 12/11/2024     Supplementary Documentation:   General Health:  In the past six months, have you lost more than 10 pounds without trying?: (Patient-Rptd) 2 - No  Has your appetite been poor?: (Patient-Rptd) No  Type of Diet: (Patient-Rptd) Balanced  How would you describe your daily physical activity?: (Patient-Rptd) Light  How would you describe your current health state?: (Patient-Rptd) Good  How do you maintain positive mental well-being?: (Patient-Rptd) Social Interaction  On a scale of 0 to 10, with 0 being no pain and 10 being severe pain, what is your pain level?: (Patient-Rptd) 0 - (None)  In the past six months, have you experienced urine leakage?: (Patient-Rptd) 0-No  At any time do you feel concerned for the safety/well-being of yourself and/or your children, in your home or elsewhere?: No  Have you had any immunizations at another office such as Influenza, Hepatitis B, Tetanus, or Pneumococcal?: (Patient-Rptd) No    Health Maintenance   Topic Date Due    Zoster Vaccines (1 of 2) Never done    Diabetes Care Dilated Eye Exam  02/09/2024    Diabetes Care: GFR  09/29/2024    Annual Physical  12/20/2024    Diabetes Care Foot Exam  02/19/2025    Diabetes Care A1C  05/04/2025    Diabetes Care: Microalb/Creat Ratio  05/31/2025    Colorectal Cancer Screening  05/19/2026    Influenza Vaccine  Completed    Annual Depression Screening  Completed    Fall Risk Screening (Annual)  Completed    Pneumococcal Vaccine: 65+ Years  Completed    COVID-19 Vaccine  Completed

## 2024-12-11 NOTE — TELEPHONE ENCOUNTER
Endocrine Refill protocol for oral and injectable diabetic medications    Protocol Criteria:  PASSED  Reason: N/A    If all below requirements are met, send a 90-day supply with 1 refill per provider protocol.    Verify appointment with Endocrinology completed in the last 6 months or scheduled in the next 3 months.  Verify A1C has been completed within the last 6 months and is below 8.5%     Last completed office visit: 11/4/2024 Rochelle Baker MD   Next scheduled Follow up:   Future Appointments   Date Time Provider Department Center          3/3/2025  9:30 AM Rochelle Baker MD [facility] [place] [facility]                    Last A1c result: Last A1c value was 7.4% done 11/4/2024.

## 2024-12-26 ENCOUNTER — OFFICE VISIT (OUTPATIENT)
Dept: PODIATRY CLINIC | Facility: CLINIC | Age: 74
End: 2024-12-26
Payer: MEDICARE

## 2024-12-26 DIAGNOSIS — B35.1 ONYCHOMYCOSIS: ICD-10-CM

## 2024-12-26 DIAGNOSIS — E11.9 TYPE 2 DIABETES MELLITUS WITHOUT COMPLICATION, WITHOUT LONG-TERM CURRENT USE OF INSULIN (HCC): Primary | ICD-10-CM

## 2024-12-26 PROCEDURE — 99213 OFFICE O/P EST LOW 20 MIN: CPT | Performed by: STUDENT IN AN ORGANIZED HEALTH CARE EDUCATION/TRAINING PROGRAM

## 2024-12-26 NOTE — PROGRESS NOTES
[facility] Podiatry  Progress Note      Rob Jones is a 74 year old male.   Chief Complaint   Patient presents with    Diabetic Foot Care     Nail trim and foot check f/u- last A1c=7.4 on 11/04/2024- LOV w/ PCP Dr. Coelho on 12/11/2024- FBS this am was not taken             HPI:       Patient is a pleasant 74-year-old diabetic male presents to clinic for bilateral foot evaluation.  Patient admits elongated toenails he is unable to trim himself.  Denies any pedal injuries or trauma.  Denies any signs of infection.  Admits to occasional neuropathy.  Does not take any medications for it    Allergies: Patient has no known allergies.    Current Outpatient Medications   Medication Sig Dispense Refill    SITagliptin Phosphate (JANUVIA) 50 MG Oral Tab Take 1 tablet (50 mg total) by mouth daily. 30 tablet 5    hydrALAZINE 25 MG Oral Tab Take 1 tablet (25 mg total) by mouth 3 (three) times daily. 270 tablet 1    LEVOTHYROXINE 50 MCG Oral Tab TAKE 1 TABLET(50 MCG) BY MOUTH BEFORE BREAKFAST 90 tablet 1    benzonatate 100 MG Oral Cap Take 1 capsule (100 mg total) by mouth 3 (three) times daily as needed for cough. 18 capsule 0    glimepiride 1 MG Oral Tab Take 1 tablet (1 mg total) by mouth daily with breakfast. 90 tablet 0    Fenofibrate 134 MG Oral Cap Take 134 mg by mouth daily. 90 capsule 1    LISINOPRIL 40 MG Oral Tab TAKE 1 TABLET(40 MG) BY MOUTH DAILY 90 tablet 1    PRAVASTATIN 40 MG Oral Tab TAKE 1 TABLET(40 MG) BY MOUTH EVERY NIGHT 90 tablet 1    METFORMIN  MG Oral Tablet 24 Hr TAKE 2 TABLETS(1000 MG) BY MOUTH TWICE DAILY WITH MEALS (Patient taking differently: Take 2 tablets (1,000 mg total) by mouth 2 (two) times daily with meals. Taking 3 tablets daily) 360 tablet 1    Icosapent Ethyl 1 g Oral Cap Take 1 capsule by mouth 2 (two) times daily. 180 capsule 3    latanoprost 0.005 % Ophthalmic Solution INSTILL 1 DROP INTO BOTH EYES EVERY EVEING 3 each 3    acetaminophen 500 MG Oral Tab Take 1 tablet  (500 mg total) by mouth every 6 (six) hours as needed for Pain. Takes one at night time. Pt stts he takes for sleep aid      Glucose Blood (FREESTYLE LITE TEST) In Vitro Strip Use test strips to check blood sugar 3 times per day as directed. 300 strip 1    FreeStyle Lancets Does not apply Misc Use lancets to check blood sugar 3 times per day as directed. 300 each 1    amLODIPine 5 MG Oral Tab Take 1 tablet (5 mg total) by mouth 2 (two) times daily. 180 tablet 3    atenolol 50 MG Oral Tab Take 1 tablet (50 mg total) by mouth 2 (two) times daily. 180 tablet 3    meclizine 25 MG Oral Tab Take 1 tablet (25 mg total) by mouth 3 (three) times daily as needed. 15 tablet 0      Past Medical History:    Asthma (HCC)    BCC (basal cell carcinoma)    right medial cheek    Bell's palsy    Calculus of kidney    Cancer (HCC)    Bladder cancer. Patient of Dr. Ryan    Colon polyp    Diabetes (HCC)    Essential hypertension    Glaucoma    20 1st visit w/ MARIBEL, patient was on Latanoprost qhs OU since around  per Dr. Guzman     High blood pressure    High cholesterol    Hyperlipidemia    Kidney stone    Pyloric stenosis (HCC)    Gavino Hunt syndrome (geniculate herpes zoster)    Rx with prednisone    Screen for colon cancer    repeat CLN in 5-7 years    Skin cancer    left shoulder       Past Surgical History:   Procedure Laterality Date    Colonoscopy      Colonoscopy      Polyps removed, I have a colonoscopy every 5 years.    Colonoscopy N/A 2021    Procedure: COLONOSCOPY;  Surgeon: YAHIR Marc MD;  [facility] ENDOSCOPY    Tonsillectomy        Family History   Problem Relation Age of Onset    Other (Other) Father          of stroke    Other (Other) Mother         Alzheimer's Disease    Diabetes Sister     Glaucoma Neg     Macular degeneration Neg       Social History     Socioeconomic History    Marital status:    Tobacco Use    Smoking status: Never     Passive exposure: Never    Smokeless  tobacco: Never    Tobacco comments:     Second-hand smoke from smoking parents and college roommate   Vaping Use    Vaping status: Never Used   Substance and Sexual Activity    Alcohol use: Yes     Comment: 1-2 drinks per month    Drug use: Never    Sexual activity: Not Currently     Partners: Female   Other Topics Concern    Grew up on a farm No    History of tanning Yes    Outdoor occupation No    Reaction to local anesthetic No    Pt has a pacemaker No    Pt has a defibrillator No           REVIEW OF SYSTEMS:     Denies nause, fever, chills  No calf pain  Denies chest pain or SOB      EXAM:   There were no vitals taken for this visit.  GENERAL: well developed, well nourished, in no apparent distress  EXTREMITIES:   1. Integument: Normal skin temperature and turgor. Toenails x10 are elongated, thickened and discolored with subungal derbi.  No signs of infection to [place] feet  2. Vascular: Dorsalis pedis two out of four bilateral and posterior tibial pulses two out of   four bilateral, capillary refill normal.   3. Musculoskeletal: All muscle groups are graded 5 out of 5 in the foot and ankle.   4. Neurological: Normal sharp dull sensation; reflexes normal.    Bilateral barefoot skin diabetic exam is normal, visualized feet and the appearance is normal.  Bilateral monofilament/sensation of both feet is normal.  Pulsation pedal pulse exam of both lower legs/feet is normal as well.             ASSESSMENT AND PLAN:   Diagnoses and all orders for this visit:    Type 2 diabetes mellitus without complication, without long-term current use of insulin ([facility])    Onychomycosis            Plan:       -Patient examined, chart history reviewed.  -Discussed importance of proper pedal hygiene, regular foot checks, and tight glucose control.  -Sharply debrided nails x 10 with a sterile nail nipper achieving a 20% reduction in thickness and length, without incident.   -Ambulate with supportive shoes and inserts and avoid walking  barefoot.  -Educated patient on acute signs of infection advised patient to seek immediate medical attention if symptoms arise.      RTC in 3 months      The patient indicates understanding of these issues and agrees to the plan.        Mercedes Ordoñez DPM

## 2024-12-31 ENCOUNTER — MED REC SCAN ONLY (OUTPATIENT)
Dept: INTERNAL MEDICINE CLINIC | Facility: CLINIC | Age: 74
End: 2024-12-31

## 2025-01-02 RX ORDER — METFORMIN HYDROCHLORIDE 500 MG/1
1000 TABLET, EXTENDED RELEASE ORAL 2 TIMES DAILY WITH MEALS
Qty: 360 TABLET | Refills: 1 | Status: SHIPPED | OUTPATIENT
Start: 2025-01-02

## 2025-01-02 NOTE — TELEPHONE ENCOUNTER
Endocrine Refill protocol for metformin    Protocol Criteria:  PASSED  Reason: N/A    If all below requirements are met, send a 90-day supply with 1 refill per provider protocol.     Verify appointment with Endocrinology completed in the last 6 months or scheduled in the next 3 months.  Verify A1C has been completed within the last 6 months and is below 8.5%  Verify last GFR is greater than or equal to 40 in the past 12 months    Last completed office visit: 11/4/24  Next scheduled Follow up:   Future Appointments   Date Time Provider Department Center   3/3/2025  9:30 AM Rochelle Baker MD [facility]   3/27/2025  8:30 AM Mercedes Ordoñez DPM [facility]   5/22/2025  8:30 AM Alex Zuluaga MD [facility]   6/18/2025 10:00 AM Floyd Coelho MD [facility]   7/21/2025 11:45 AM Alex Zuluaga MD [facility] [place]       Last GFR result:    Lab Results   Component Value Date    EGFRCR 78 09/29/2023     Last A1c result: Last A1C result: 7.4% done 11/4/2024.

## 2025-01-04 ENCOUNTER — LAB ENCOUNTER (OUTPATIENT)
Dept: LAB | Age: 75
End: 2025-01-04
Attending: INTERNAL MEDICINE
Payer: MEDICARE

## 2025-01-04 DIAGNOSIS — I10 ESSENTIAL HYPERTENSION: ICD-10-CM

## 2025-01-04 DIAGNOSIS — Z12.5 SCREENING FOR PROSTATE CANCER: ICD-10-CM

## 2025-01-04 LAB
ALBUMIN SERPL-MCNC: 4.6 G/DL (ref 3.2–4.8)
ALBUMIN/GLOB SERPL: 1.9 {RATIO} (ref 1–2)
ALP LIVER SERPL-CCNC: 24 U/L
ALT SERPL-CCNC: 44 U/L
ANION GAP SERPL CALC-SCNC: 8 MMOL/L (ref 0–18)
AST SERPL-CCNC: 34 U/L (ref ?–34)
BASOPHILS # BLD AUTO: 0.08 X10(3) UL (ref 0–0.2)
BASOPHILS NFR BLD AUTO: 1 %
BILIRUB SERPL-MCNC: 0.6 MG/DL (ref 0.2–1.1)
BUN BLD-MCNC: 21 MG/DL (ref 9–23)
BUN/CREAT SERPL: 18.3 (ref 10–20)
CALCIUM BLD-MCNC: 9.8 MG/DL (ref 8.7–10.4)
CHLORIDE SERPL-SCNC: 105 MMOL/L (ref 98–112)
CHOLEST SERPL-MCNC: 157 MG/DL (ref ?–200)
CO2 SERPL-SCNC: 23 MMOL/L (ref 21–32)
COMPLEXED PSA SERPL-MCNC: 3.3 NG/ML (ref ?–4)
CREAT BLD-MCNC: 1.15 MG/DL
DEPRECATED RDW RBC AUTO: 47 FL (ref 35.1–46.3)
EGFRCR SERPLBLD CKD-EPI 2021: 66 ML/MIN/1.73M2 (ref 60–?)
EOSINOPHIL # BLD AUTO: 0.4 X10(3) UL (ref 0–0.7)
EOSINOPHIL NFR BLD AUTO: 5.2 %
ERYTHROCYTE [DISTWIDTH] IN BLOOD BY AUTOMATED COUNT: 13.2 % (ref 11–15)
FASTING PATIENT LIPID ANSWER: YES
FASTING STATUS PATIENT QL REPORTED: YES
GLOBULIN PLAS-MCNC: 2.4 G/DL (ref 2–3.5)
GLUCOSE BLD-MCNC: 143 MG/DL (ref 70–99)
HCT VFR BLD AUTO: 47.2 %
HDLC SERPL-MCNC: 36 MG/DL (ref 40–59)
HGB BLD-MCNC: 16 G/DL
IMM GRANULOCYTES # BLD AUTO: 0.03 X10(3) UL (ref 0–1)
IMM GRANULOCYTES NFR BLD: 0.4 %
LDLC SERPL CALC-MCNC: 78 MG/DL (ref ?–100)
LYMPHOCYTES # BLD AUTO: 2.45 X10(3) UL (ref 1–4)
LYMPHOCYTES NFR BLD AUTO: 31.8 %
MCH RBC QN AUTO: 33.1 PG (ref 26–34)
MCHC RBC AUTO-ENTMCNC: 33.9 G/DL (ref 31–37)
MCV RBC AUTO: 97.5 FL
MONOCYTES # BLD AUTO: 0.93 X10(3) UL (ref 0.1–1)
MONOCYTES NFR BLD AUTO: 12.1 %
NEUTROPHILS # BLD AUTO: 3.81 X10 (3) UL (ref 1.5–7.7)
NEUTROPHILS # BLD AUTO: 3.81 X10(3) UL (ref 1.5–7.7)
NEUTROPHILS NFR BLD AUTO: 49.5 %
NONHDLC SERPL-MCNC: 121 MG/DL (ref ?–130)
OSMOLALITY SERPL CALC.SUM OF ELEC: 287 MOSM/KG (ref 275–295)
PLATELET # BLD AUTO: 222 10(3)UL (ref 150–450)
POTASSIUM SERPL-SCNC: 4.6 MMOL/L (ref 3.5–5.1)
PROT SERPL-MCNC: 7 G/DL (ref 5.7–8.2)
RBC # BLD AUTO: 4.84 X10(6)UL
SODIUM SERPL-SCNC: 136 MMOL/L (ref 136–145)
TRIGL SERPL-MCNC: 261 MG/DL (ref 30–149)
TSI SER-ACNC: 3.72 UIU/ML (ref 0.55–4.78)
VIT B12 SERPL-MCNC: 281 PG/ML (ref 211–911)
VLDLC SERPL CALC-MCNC: 41 MG/DL (ref 0–30)
WBC # BLD AUTO: 7.7 X10(3) UL (ref 4–11)

## 2025-01-04 PROCEDURE — 85025 COMPLETE CBC W/AUTO DIFF WBC: CPT

## 2025-01-04 PROCEDURE — 82607 VITAMIN B-12: CPT

## 2025-01-04 PROCEDURE — 80053 COMPREHEN METABOLIC PANEL: CPT

## 2025-01-04 PROCEDURE — 80061 LIPID PANEL: CPT

## 2025-01-04 PROCEDURE — 36415 COLL VENOUS BLD VENIPUNCTURE: CPT

## 2025-01-04 PROCEDURE — 84443 ASSAY THYROID STIM HORMONE: CPT

## 2025-02-18 RX ORDER — GLIMEPIRIDE 1 MG/1
1 TABLET ORAL
Qty: 90 TABLET | Refills: 1 | Status: SHIPPED | OUTPATIENT
Start: 2025-02-18

## 2025-02-18 NOTE — TELEPHONE ENCOUNTER
Endocrine Refill protocol for oral and injectable diabetic medications    Protocol Criteria:  PASSED  Reason: N/A    If all below requirements are met, send a 90-day supply with 1 refill per provider protocol.    Verify appointment with Endocrinology completed in the last 6 months or scheduled in the next 3 months.  Verify A1C has been completed within the last 6 months and is below 8.5%     Last completed office visit: 11/4/2024 Rochelle Baker MD   Next scheduled Follow up:   Future Appointments   Date Time Provider Department Center   4/16/2025 12:30 PM Rochelle Baker MD [facility]      Last A1c result: Last A1c value was 7.4% done 11/4/2024.

## 2025-02-20 ENCOUNTER — OFFICE VISIT (OUTPATIENT)
Dept: OTOLARYNGOLOGY | Facility: CLINIC | Age: 75
End: 2025-02-20
Payer: MEDICARE

## 2025-02-20 VITALS — HEIGHT: 68 IN | BODY MASS INDEX: 28.49 KG/M2 | WEIGHT: 188 LBS

## 2025-02-20 DIAGNOSIS — H61.23 BILATERAL IMPACTED CERUMEN: Primary | ICD-10-CM

## 2025-02-20 PROCEDURE — 69210 REMOVE IMPACTED EAR WAX UNI: CPT | Performed by: OTOLARYNGOLOGY

## 2025-02-20 NOTE — PROGRESS NOTES
Rob Jones is a 74 year old male.    Chief Complaint   Patient presents with    Ear Wax     Ear cleaning       HISTORY OF PRESENT ILLNESS    Patient presents for cerumen removal. No other complaints or concerns at this time    Social History     Socioeconomic History    Marital status:    Tobacco Use    Smoking status: Never     Passive exposure: Never    Smokeless tobacco: Never    Tobacco comments:     Second-hand smoke from smoking parents and college roommate   Vaping Use    Vaping status: Never Used   Substance and Sexual Activity    Alcohol use: Yes     Comment: 1-2 drinks per month    Drug use: Never    Sexual activity: Not Currently     Partners: Female   Other Topics Concern    Grew up on a farm No    History of tanning Yes    Outdoor occupation No    Reaction to local anesthetic No    Pt has a pacemaker No    Pt has a defibrillator No       Family History   Problem Relation Age of Onset    Other (Other) Father          of stroke    Other (Other) Mother         Alzheimer's Disease    Diabetes Sister     Glaucoma Neg     Macular degeneration Neg        Past Medical History:    Asthma (HCC)    BCC (basal cell carcinoma)    right medial cheek    Bell's palsy    Calculus of kidney    Cancer (HCC)    Bladder cancer. Patient of Dr. Ryan    Colon polyp    Diabetes (HCC)    Essential hypertension    Glaucoma    20 1st visit w/ MARIBEL, patient was on Latanoprost qhs OU since around  per Dr. Guzman     High blood pressure    High cholesterol    Hyperlipidemia    Kidney stone    Pyloric stenosis (HCC)    Gavino Hunt syndrome (geniculate herpes zoster)    Rx with prednisone    Screen for colon cancer    repeat CLN in 5-7 years    Skin cancer    left shoulder        Past Surgical History:   Procedure Laterality Date    Colonoscopy      Colonoscopy      Polyps removed, I have a colonoscopy every 5 years.    Colonoscopy N/A 2021    Procedure: COLONOSCOPY;  Surgeon: YAHIR Marc,  MD;  Location: [facility] ENDOSCOPY    Tonsillectomy         REVIEW OF SYSTEMS    System Neg/Pos Details   Constitutional Negative Fatigue, fever and weight loss.   ENMT Negative Drooling.   Eyes Negative Blurred vision and vision changes.   Respiratory Negative Dyspnea and wheezing.   Cardio Negative Chest pain, irregular heartbeat/palpitations and syncope.   GI Negative Abdominal pain and diarrhea.   Endocrine Negative Cold intolerance and heat intolerance.   Neuro Negative Tremors.   Psych Negative Anxiety and depression.   Integumentary Negative Frequent skin infections, pigment change and rash.   Hema/Lymph Negative Easy bleeding and easy bruising.           PHYSICAL EXAM    Ht 5' 8\" (1.727 m)   Wt 188 lb (85.3 kg)   BMI 28.59 kg/m²        Constitutional Normal Overall appearance - Normal.        Neck Exam Normal Inspection - Normal. Palpation - Normal. Parotid gland - Normal. Thyroid gland - Normal.             Head/Face Normal Facial features - Normal. Eyebrows - Normal. Skull - Normal.             Ears Normal Inspection - Right: Normal, Left: Normal. Canal - Right: Normal, Left: Normal. TM - Right: Normal, Left: Normal.   Skin Normal Inspection - Normal.                              Canals:  Right: Canal reveals cerumen impaction,   Left: Canal reveals cerumen impaction,     Tympanic Membranes:  Right: Normal tympanic membrane.   Left: Normal tympanic membrane.     TM Visualized Method:   Right TM examined via otomicroscopy.    Left TM examined via otomicroscopy.      PROCEDURE:    Removal of cerumen impaction   The cerumen impaction was completely removed using microscopy.   Removal was completed by using acurette and/or suction.   Comments: Return to clinic as needed.  Avoid q-tips, water precautions and use over the counter wax remedies as needed.      Current Outpatient Medications:     glimepiride 1 MG Oral Tab, Take 1 tablet (1 mg total) by mouth daily with breakfast., Disp: 90 tablet, Rfl: 1    METFORMIN   MG Oral Tablet 24 Hr, TAKE 2 TABLETS(1000 MG) BY MOUTH TWICE DAILY WITH MEALS, Disp: 360 tablet, Rfl: 1    SITagliptin Phosphate (JANUVIA) 50 MG Oral Tab, Take 1 tablet (50 mg total) by mouth daily., Disp: 30 tablet, Rfl: 5    hydrALAZINE 25 MG Oral Tab, Take 1 tablet (25 mg total) by mouth 3 (three) times daily., Disp: 270 tablet, Rfl: 1    LEVOTHYROXINE 50 MCG Oral Tab, TAKE 1 TABLET(50 MCG) BY MOUTH BEFORE BREAKFAST, Disp: 90 tablet, Rfl: 1    benzonatate 100 MG Oral Cap, Take 1 capsule (100 mg total) by mouth 3 (three) times daily as needed for cough., Disp: 18 capsule, Rfl: 0    Fenofibrate 134 MG Oral Cap, Take 134 mg by mouth daily., Disp: 90 capsule, Rfl: 1    LISINOPRIL 40 MG Oral Tab, TAKE 1 TABLET(40 MG) BY MOUTH DAILY, Disp: 90 tablet, Rfl: 1    PRAVASTATIN 40 MG Oral Tab, TAKE 1 TABLET(40 MG) BY MOUTH EVERY NIGHT, Disp: 90 tablet, Rfl: 1    Icosapent Ethyl 1 g Oral Cap, Take 1 capsule by mouth 2 (two) times daily., Disp: 180 capsule, Rfl: 3    latanoprost 0.005 % Ophthalmic Solution, INSTILL 1 DROP INTO BOTH EYES EVERY EVEING, Disp: 3 each, Rfl: 3    acetaminophen 500 MG Oral Tab, Take 1 tablet (500 mg total) by mouth every 6 (six) hours as needed for Pain. Takes one at night time. Pt stts he takes for sleep aid, Disp: , Rfl:     Glucose Blood (FREESTYLE LITE TEST) In Vitro Strip, Use test strips to check blood sugar 3 times per day as directed., Disp: 300 strip, Rfl: 1    FreeStyle Lancets Does not apply Misc, Use lancets to check blood sugar 3 times per day as directed., Disp: 300 each, Rfl: 1    amLODIPine 5 MG Oral Tab, Take 1 tablet (5 mg total) by mouth 2 (two) times daily., Disp: 180 tablet, Rfl: 3    atenolol 50 MG Oral Tab, Take 1 tablet (50 mg total) by mouth 2 (two) times daily., Disp: 180 tablet, Rfl: 3    meclizine 25 MG Oral Tab, Take 1 tablet (25 mg total) by mouth 3 (three) times daily as needed., Disp: 15 tablet, Rfl: 0  ASSESSMENT AND PLAN    1. Bilateral impacted  cerumen        All cerumen was removed using microscopy. I have asked the patient to return to see me as needed for repeat cerumen removal in the future.      Murtaza Guajardo MD    2/20/2025    1:15 PM

## 2025-03-24 DIAGNOSIS — I10 ESSENTIAL HYPERTENSION: ICD-10-CM

## 2025-03-24 RX ORDER — LEVOTHYROXINE SODIUM 50 UG/1
50 TABLET ORAL
Qty: 90 TABLET | Refills: 1 | Status: SHIPPED | OUTPATIENT
Start: 2025-03-24

## 2025-03-24 RX ORDER — FENOFIBRATE 134 MG/1
134 CAPSULE ORAL DAILY
Qty: 90 CAPSULE | Refills: 1 | Status: SHIPPED | OUTPATIENT
Start: 2025-03-24

## 2025-03-24 NOTE — TELEPHONE ENCOUNTER
Endocrine refill protocol for lipid lowering medications    Protocol Criteria:  PASSED     If all below requirements are met, send a 90-day supply with 1 refill per provider protocol.    Verify appointment with Endocrinology completed in the last 6 months or scheduled in the next 3 months.  Lipid panel must have been completed in the last 12 months   ALT result below 80  LDL result below 130    Last completed office visit:11/4/2024 Rochelle Baker MD   Next scheduled Follow up:   Future Appointments   Date Time Provider Department Center   4/16/2025 12:30 PM Rochelle Baker MD [facility]      Last Lipid panel date: Cholesterol: 157, done on 1/4/2025.  HDL Cholesterol: 36, done on 1/4/2025.  TriGlycerides 261, done on 1/4/2025.  LDL Cholesterol: 78, done on 1/4/2025.     Last ALT result: Last ALT was 44 done on 1/4/2025.  Last AST was 34 done on 1/4/2025.

## 2025-03-27 ENCOUNTER — OFFICE VISIT (OUTPATIENT)
Dept: FAMILY MEDICINE CLINIC | Facility: CLINIC | Age: 75
End: 2025-03-27
Payer: MEDICARE

## 2025-03-27 ENCOUNTER — OFFICE VISIT (OUTPATIENT)
Dept: PODIATRY CLINIC | Facility: CLINIC | Age: 75
End: 2025-03-27
Payer: MEDICARE

## 2025-03-27 VITALS
RESPIRATION RATE: 16 BRPM | TEMPERATURE: 98 F | WEIGHT: 186.69 LBS | OXYGEN SATURATION: 98 % | HEART RATE: 60 BPM | HEIGHT: 68 IN | SYSTOLIC BLOOD PRESSURE: 126 MMHG | BODY MASS INDEX: 28.29 KG/M2 | DIASTOLIC BLOOD PRESSURE: 64 MMHG

## 2025-03-27 DIAGNOSIS — L03.011 PARONYCHIA OF FINGER, RIGHT: Primary | ICD-10-CM

## 2025-03-27 DIAGNOSIS — E11.9 TYPE 2 DIABETES MELLITUS WITHOUT COMPLICATION, WITHOUT LONG-TERM CURRENT USE OF INSULIN (HCC): Primary | ICD-10-CM

## 2025-03-27 DIAGNOSIS — B35.1 ONYCHOMYCOSIS: ICD-10-CM

## 2025-03-27 PROCEDURE — 99214 OFFICE O/P EST MOD 30 MIN: CPT | Performed by: STUDENT IN AN ORGANIZED HEALTH CARE EDUCATION/TRAINING PROGRAM

## 2025-03-27 PROCEDURE — 99213 OFFICE O/P EST LOW 20 MIN: CPT | Performed by: NURSE PRACTITIONER

## 2025-03-27 NOTE — PROGRESS NOTES
[facility]iat[facility]  Progress Note      Rob Jones is a 74 year old male.   Chief Complaint   Patient presents with    Diabetic Foot Care     F/u Diabetic Foot Care,  yesterday am, A1C= 7.4 done on 11/04/2024; on 12/11/2024 LOV with PCP Floyd Lazaro MD.               HPI:       Patient is a pleasant 74-year-old diabetic male presents to clinic for bilateral foot evaluation.  Patient admits elongated toenails he is unable to trim himself.  Denies any pedal injuries or trauma.  Denies any signs of infection.  Admits to occasional neuropathy.  Does not take any medications for it    Allergies: Patient has no known allergies.    Current Outpatient Medications   Medication Sig Dispense Refill    FENOFIBRATE MICRONIZED 134 MG Oral Cap TAKE 1 CAPSULE BY MOUTH DAILY 90 capsule 1    LEVOTHYROXINE 50 MCG Oral Tab TAKE 1 TABLET(50 MCG) BY MOUTH BEFORE BREAKFAST 90 tablet 1    glimepiride 1 MG Oral Tab Take 1 tablet (1 mg total) by mouth daily with breakfast. 90 tablet 1    METFORMIN  MG Oral Tablet 24 Hr TAKE 2 TABLETS(1000 MG) BY MOUTH TWICE DAILY WITH MEALS 360 tablet 1    SITagliptin Phosphate (JANUVIA) 50 MG Oral Tab Take 1 tablet (50 mg total) by mouth daily. 30 tablet 5    hydrALAZINE 25 MG Oral Tab Take 1 tablet (25 mg total) by mouth 3 (three) times daily. 270 tablet 1    LISINOPRIL 40 MG Oral Tab TAKE 1 TABLET(40 MG) BY MOUTH DAILY 90 tablet 1    PRAVASTATIN 40 MG Oral Tab TAKE 1 TABLET(40 MG) BY MOUTH EVERY NIGHT 90 tablet 1    Icosapent Ethyl 1 g Oral Cap Take 1 capsule by mouth 2 (two) times daily. 180 capsule 3    latanoprost 0.005 % Ophthalmic Solution INSTILL 1 DROP INTO BOTH EYES EVERY EVEING 3 each 3    acetaminophen 500 MG Oral Tab Take 1 tablet (500 mg total) by mouth every 6 (six) hours as needed for Pain. Takes one at night time. Pt stts he takes for sleep aid      Glucose Blood (FREESTYLE LITE TEST) In Vitro Strip Use test strips to check blood sugar 3 times per day as  directed. 300 strip 1    FreeStyle Lancets Does not apply Misc Use lancets to check blood sugar 3 times per day as directed. 300 each 1    amLODIPine 5 MG Oral Tab Take 1 tablet (5 mg total) by mouth 2 (two) times daily. 180 tablet 3    atenolol 50 MG Oral Tab Take 1 tablet (50 mg total) by mouth 2 (two) times daily. 180 tablet 3    benzonatate 100 MG Oral Cap Take 1 capsule (100 mg total) by mouth 3 (three) times daily as needed for cough. 18 capsule 0    meclizine 25 MG Oral Tab Take 1 tablet (25 mg total) by mouth 3 (three) times daily as needed. 15 tablet 0      Past Medical History:    Asthma (HCC)    BCC (basal cell carcinoma)    right medial cheek    Bell's palsy    Calculus of kidney    Cancer (HCC)    Bladder cancer. Patient of Dr. Ryan    Colon polyp    Diabetes (HCC)    Essential hypertension    Glaucoma    20 1st visit w/ RJM, patient was on Latanoprost qhs OU since around  per Dr. Guzman     High blood pressure    High cholesterol    Hyperlipidemia    Kidney stone    Pyloric stenosis (HCC)    Bigelow Hunt syndrome (geniculate herpes zoster)    Rx with prednisone    Screen for colon cancer    repeat CLN in 5-7 years    Skin cancer    left shoulder       Past Surgical History:   Procedure Laterality Date    Colonoscopy      Colonoscopy      Polyps removed, I have a colonoscopy every 5 years.    Colonoscopy N/A 2021    Procedure: COLONOSCOPY;  Surgeon: YAHIR Marc MD;  Location: [facility] ENDOSCOPY    Tonsillectomy        Family History   Problem Relation Age of Onset    Other (Other) Father          of stroke    Other (Other) Mother         Alzheimer's Disease    Diabetes Sister     Glaucoma Neg     Macular degeneration Neg       Social History     Socioeconomic History    Marital status:    Tobacco Use    Smoking status: Never     Passive exposure: Never    Smokeless tobacco: Never    Tobacco comments:     Second-hand smoke from smoking parents and college roommate    Vaping Use    Vaping status: Never Used   Substance and Sexual Activity    Alcohol use: Yes     Comment: 1-2 drinks per month    Drug use: Never    Sexual activity: Not Currently     Partners: Female   Other Topics Concern    Grew up on a farm No    History of tanning Yes    Outdoor occupation No    Reaction to local anesthetic No    Pt has a pacemaker No    Pt has a defibrillator No           REVIEW OF SYSTEMS:     Denies nause, fever, chills  No calf pain  Denies chest pain or SOB      EXAM:   There were no vitals taken for this visit.  GENERAL: well developed, well nourished, in no apparent distress  EXTREMITIES:   1. Integument: Normal skin temperature and turgor. Toenails x10 are elongated, thickened and discolored with subungal derbi.  No signs of infection to chel[place] feet  2. Vascular: Dorsalis pedis two out of four bilateral and posterior tibial pulses two out of   four bilateral, capillary refill normal.   3. Musculoskeletal: All muscle groups are graded 5 out of 5 in the foot and ankle.   4. Neurological: Normal sharp dull sensation; reflexes normal.    Bilateral barefoot skin diabetic exam is normal, visualized feet and the appearance is normal.  Bilateral monofilament/sensation of both feet is normal.  Pulsation pedal pulse exam of both lower legs/feet is normal as well.             ASSESSMENT AND PLAN:   Diagnoses and all orders for this visit:    Type 2 diabetes mellitus without complication, without long-term current use of insulin ([facility])    Onychomycosis            Plan:       -Patient examined, chart history reviewed.  -Discussed importance of proper pedal hygiene, regular foot checks, and tight glucose control.  -Sharply debrided nails x 10 with a sterile nail nipper achieving a 20% reduction in thickness and length, without incident.   -Ambulate with supportive shoes and inserts and avoid walking barefoot.  -Educated patient on acute signs of infection advised patient to seek immediate medical  attention if symptoms arise.      RTC in 3 months      The patient indicates understanding of these issues and agrees to the plan.        Mercedes Ordoñez DPM

## 2025-03-27 NOTE — PROGRESS NOTES
CHIEF COMPLAINT:     Chief Complaint   Patient presents with    Finger Pain     Sx Tuesday - R middle finger tenderness and discomfort. Bites his nails sometimes, puffiness, warm to the touch  Denies discharge  No OTC       HPI:     Rob Jones is a 74 year old male who presents with concerns of skin infection to right middle finger. Patient first noticed symptoms 2 days ago.  Reports some localized pain and swelling to the nailbed.  No drainage yet. Symptoms have been worsening since onset.  Affected location includes: Right middle finger nailbed only.     Treatments: None.  No other associated symptoms.  Denies fever, streaking of wound, or other signs of systemic illness.  Admits does tend to bite his cuticles.    Current Outpatient Medications   Medication Sig Dispense Refill    amoxicillin clavulanate 875-125 MG Oral Tab Take 1 tablet by mouth 2 (two) times daily for 7 days. 14 tablet 0    FENOFIBRATE MICRONIZED 134 MG Oral Cap TAKE 1 CAPSULE BY MOUTH DAILY 90 capsule 1    LEVOTHYROXINE 50 MCG Oral Tab TAKE 1 TABLET(50 MCG) BY MOUTH BEFORE BREAKFAST 90 tablet 1    glimepiride 1 MG Oral Tab Take 1 tablet (1 mg total) by mouth daily with breakfast. 90 tablet 1    METFORMIN  MG Oral Tablet 24 Hr TAKE 2 TABLETS(1000 MG) BY MOUTH TWICE DAILY WITH MEALS 360 tablet 1    SITagliptin Phosphate (JANUVIA) 50 MG Oral Tab Take 1 tablet (50 mg total) by mouth daily. 30 tablet 5    hydrALAZINE 25 MG Oral Tab Take 1 tablet (25 mg total) by mouth 3 (three) times daily. 270 tablet 1    LISINOPRIL 40 MG Oral Tab TAKE 1 TABLET(40 MG) BY MOUTH DAILY 90 tablet 1    PRAVASTATIN 40 MG Oral Tab TAKE 1 TABLET(40 MG) BY MOUTH EVERY NIGHT 90 tablet 1    Icosapent Ethyl 1 g Oral Cap Take 1 capsule by mouth 2 (two) times daily. 180 capsule 3    latanoprost 0.005 % Ophthalmic Solution INSTILL 1 DROP INTO BOTH EYES EVERY EVEING 3 each 3    acetaminophen 500 MG Oral Tab Take 1 tablet (500 mg total) by mouth every 6 (six) hours as  needed for Pain. Takes one at night time. Pt stts he takes for sleep aid      Glucose Blood (FREESTYLE LITE TEST) In Vitro Strip Use test strips to check blood sugar 3 times per day as directed. 300 strip 1    FreeStyle Lancets Does not apply Misc Use lancets to check blood sugar 3 times per day as directed. 300 each 1    amLODIPine 5 MG Oral Tab Take 1 tablet (5 mg total) by mouth 2 (two) times daily. 180 tablet 3    atenolol 50 MG Oral Tab Take 1 tablet (50 mg total) by mouth 2 (two) times daily. 180 tablet 3      Past Medical History:    Asthma (HCC)    BCC (basal cell carcinoma)    right medial cheek    Bell's palsy    Calculus of kidney    Cancer (HCC)    Bladder cancer. Patient of Dr. Ryan    Colon polyp    Diabetes (HCC)    Essential hypertension    Glaucoma    12/31/20 1st visit w/ MARIBEL, patient was on Latanoprost qhs OU since around 2015 per Dr. Guzman     High blood pressure    High cholesterol    Hyperlipidemia    Kidney stone    Pyloric stenosis (HCC)    Gavino Hunt syndrome (geniculate herpes zoster)    Rx with prednisone    Screen for colon cancer    repeat CLN in 5-7 years    Skin cancer    left shoulder       Social History:  Social History     Socioeconomic History    Marital status:    Tobacco Use    Smoking status: Never     Passive exposure: Never    Smokeless tobacco: Never    Tobacco comments:     Second-hand smoke from smoking parents and college roommate   Vaping Use    Vaping status: Never Used   Substance and Sexual Activity    Alcohol use: Yes     Comment: 1-2 drinks per month    Drug use: Never    Sexual activity: Not Currently     Partners: Female   Other Topics Concern    Grew up on a farm No    History of tanning Yes    Outdoor occupation No    Reaction to local anesthetic No    Pt has a pacemaker No    Pt has a defibrillator No        REVIEW OF SYSTEMS:   GENERAL: feels well otherwise, no fever, no chills.  SKIN: as above.  CHEST: no chest pains, no palpitations.  LUNGS:  denies shortness of breath with exertion or rest. No wheezing, no cough.  LYMPH: No enlargement of the lymph nodes.  MUSC/SKEL: no joint swelling, no joint stiffness.  CARDIOVASCULAR: denies chest pain on exertion or rest.  GI: no nausea, no vomiting or abdominal pain  NEURO: no abnormal sensation, no tingling of the skin or numbness.    EXAM:   /64   Pulse 60   Temp 97.8 °F (36.6 °C) (Tympanic)   Resp 16   Ht 5' 8\" (1.727 m)   Wt 186 lb 11.2 oz (84.7 kg)   SpO2 98%   BMI 28.39 kg/m²   GENERAL: well developed, well nourished, and in no apparent distress  SKIN: + Right 3rd finger nailbed with tenderness, minimal swelling.  No discharge, remainder of finger appears normal non-tender.  EXTREMITIES: No clubbing or edema, strong radial pulses.    ASSESSMENT AND PLAN:     ASSESSMENT:   Encounter Diagnosis   Name Primary?    Paronychia of finger, right Yes       PLAN:   - Medication as below.  - Warm soaks, apply antibiotic ointment.  - Skin care discussed with patient.   - Instructions and Comfort Care as listed in Patient Instructions.    - The patient was advised to return in 3 days if sx's persist or worsen.  AdvisedER if ever new systemic symptoms/fever or streaking from wound.  - The patient indicates understanding of these issues and agrees to the plan.    Requested Prescriptions     Signed Prescriptions Disp Refills    amoxicillin clavulanate 875-125 MG Oral Tab 14 tablet 0     Sig: Take 1 tablet by mouth 2 (two) times daily for 7 days.   Medication side effects/instructions reviewed.  Take w/ food.  Pt verbalizes understanding.    There are no Patient Instructions on file for this visit.

## 2025-03-28 RX ORDER — ATENOLOL 50 MG/1
50 TABLET ORAL 2 TIMES DAILY
Qty: 180 TABLET | Refills: 3 | Status: SHIPPED | OUTPATIENT
Start: 2025-03-28

## 2025-03-28 RX ORDER — AMLODIPINE BESYLATE 5 MG/1
5 TABLET ORAL 2 TIMES DAILY
Qty: 180 TABLET | Refills: 3 | Status: SHIPPED | OUTPATIENT
Start: 2025-03-28

## 2025-03-30 ENCOUNTER — HOSPITAL ENCOUNTER (OUTPATIENT)
Age: 75
Discharge: HOME | End: 2025-03-30
Payer: MEDICARE

## 2025-03-30 ENCOUNTER — OFFICE VISIT (OUTPATIENT)
Dept: FAMILY MEDICINE CLINIC | Facility: CLINIC | Age: 75
End: 2025-03-30
Payer: MEDICARE

## 2025-03-30 VITALS
TEMPERATURE: 98 F | OXYGEN SATURATION: 99 % | DIASTOLIC BLOOD PRESSURE: 53 MMHG | HEART RATE: 59 BPM | SYSTOLIC BLOOD PRESSURE: 131 MMHG | RESPIRATION RATE: 17 BRPM

## 2025-03-30 DIAGNOSIS — L03.011 PARONYCHIA OF FINGER OF RIGHT HAND: Primary | ICD-10-CM

## 2025-03-30 DIAGNOSIS — L03.019 PARONYCHIA OF MIDDLE FINGER: Primary | ICD-10-CM

## 2025-03-30 PROCEDURE — 10060 I&D ABSCESS SIMPLE/SINGLE: CPT | Performed by: NURSE PRACTITIONER

## 2025-03-30 PROCEDURE — 99213 OFFICE O/P EST LOW 20 MIN: CPT | Performed by: NURSE PRACTITIONER

## 2025-03-30 NOTE — DISCHARGE INSTRUCTIONS
Soak the finger in warm soapy water twice daily you may dry it well and apply the antibiotic ointment you have at home over the wound and cover with a Band-Aid for 2 more days finish the oral antibiotic that you were given in the walk-in clinic you may take Tylenol for pain or discomfort if you develop more swelling or pus along the nail redness is moving up the finger cannot move the digit worsening pain body aches chills or fever go to the nearest for department otherwise you could see your primary care provider 2 to 3 days for wound check but if it is not completely resolving follow-up with the hand specialist as discussed.

## 2025-03-30 NOTE — ED PROVIDER NOTES
Patient Seen in: [facility]      History     Chief Complaint   Patient presents with    Finger Injury     Stated Complaint: finger complaint    Subjective:   HPI    This is a 74-year-old male with history of asthma diabetes hyperlipidemia hypertension presenting with finger pain and swelling.  Patient states he was recently seen at at the walk-in clinic for right middle finger infection along the cuticle was started on antibiotics but its gotten bigger sees more pus and thinks it needs to be drained.  No numbness or tingling in the extremity no difficulty moving the digit.  No fever or chills  Right third digit  Right hand dominant    Objective:     Past Medical History:    Asthma (HCC)    BCC (basal cell carcinoma)    right medial cheek    Bell's palsy    Calculus of kidney    Cancer (HCC)    Bladder cancer. Patient of Dr. Ryan    Colon polyp    Diabetes (HCC)    Essential hypertension    Glaucoma    12/31/20 1st visit w/ MARIBEL, patient was on Latanoprost qhs OU since around 2015 per Dr. Guzman     High blood pressure    High cholesterol    Hyperlipidemia    Kidney stone    Pyloric stenosis (HCC)    Gavino Hunt syndrome (geniculate herpes zoster)    Rx with prednisone    Screen for colon cancer    repeat CLN in 5-7 years    Skin cancer    left shoulder               Past Surgical History:   Procedure Laterality Date    Colonoscopy      Colonoscopy  1996    Polyps removed, I have a colonoscopy every 5 years.    Colonoscopy N/A 5/19/2021    Procedure: COLONOSCOPY;  Surgeon: YAHIR Marc MD;  Location: [facility] ENDOSCOPY    Tonsillectomy                  Social History     Socioeconomic History    Marital status:    Tobacco Use    Smoking status: Never     Passive exposure: Never    Smokeless tobacco: Never    Tobacco comments:     Second-hand smoke from smoking parents and college roommate   Vaping Use    Vaping status: Never Used   Substance and Sexual Activity    Alcohol use: Yes     Comment:  1-2 drinks per month    Drug use: Never    Sexual activity: Not Currently     Partners: Female   Other Topics Concern    Grew up on a farm No    History of tanning Yes    Outdoor occupation No    Reaction to local anesthetic No    Pt has a pacemaker No    Pt has a defibrillator No              Review of Systems    Positive for stated complaint: finger complaint  Other systems are as noted in HPI.  Constitutional and vital signs reviewed.      All other systems reviewed and negative except as noted above.    Physical Exam     ED Triage Vitals [03/30/25 1011]   /53   Pulse 59   Resp 17   Temp 97.6 °F (36.4 °C)   Temp src Oral   SpO2 99 %   O2 Device None (Room air)       Current Vitals:   Vital Signs  BP: 131/53  Pulse: 59  Resp: 17  Temp: 97.6 °F (36.4 °C)  Temp src: Oral    Oxygen Therapy  SpO2: 99 %  O2 Device: None (Room air)        Physical Exam  Vitals and nursing note reviewed.   Constitutional:       Appearance: Normal appearance.   HENT:      Right Ear: External ear normal.      Left Ear: External ear normal.      Nose: Nose normal.      Mouth/Throat:      Mouth: Mucous membranes are moist.      Pharynx: Oropharynx is clear.   Eyes:      Conjunctiva/sclera: Conjunctivae normal.   Musculoskeletal:         General: Normal range of motion.        Hands:       Cervical back: Normal range of motion.   Skin:     General: Skin is warm.      Capillary Refill: Capillary refill takes less than 2 seconds.   Neurological:      General: No focal deficit present.      Mental Status: He is alert and oriented to person, place, and time.             ED Course   Labs Reviewed - No data to display            MDM           Medical Decision Making  74-year-old male well-appearing and nontoxic presenting with pus along the cuticle of the right finger was seen at the walk-in clinic and started on Augmentin.  DDx paronychia versus cellulitis.  Discussed with the patient likely a paronychia discussed incision and drainage.   Discussed cleaning the skin with Betadine and taking an 18-gauge needle to go right across the area of pus.  Discussed that he can be injected with lidocaine but this would take multiple injections to numb the entire finger he is declining the lidocaine injection is agrees with incision and drainage using the 18-gauge needle.  Discussed soaking the finger twice daily in warm soapy water as it will likely continue to drain may cover the area with a Band-Aid after applying antibiotic ointment.  Discussed finishing the antibiotic outpatient follow-up also provided the hand specialist if symptoms do not completely resolve and ER precautions were discussed.  Patient acknowledges understanding instructions.    Procedure: Right third digit prepped with Betadine using an 18-gauge needle less than 0.5 cm incision made into the area of fluctuance produced a moderate amount of purulent drainage the wound was irrigated with saline antibiotic ointment and a Band-Aid applied patient tolerated procedure well.    Problems Addressed:  Paronychia of finger of right hand: acute illness or injury    Risk  OTC drugs.        Disposition and Plan     Clinical Impression:  1. Paronychia of finger of right hand         Disposition:  Discharge  3/30/2025 10:29 am    Follow-up:  Chito Garcia MD  [address] [facility] [id number] [phone]      Hand specialist to follow-up with if symptoms are not improving    Floyd Coelho MD  [address] [facility] [id number] [phone]    Schedule an appointment as soon as possible for a visit in 3 days  For wound re-check          Medications Prescribed:  Discharge Medication List as of 3/30/2025 10:29 AM              Supplementary Documentation:

## 2025-03-30 NOTE — PROGRESS NOTES
Pt presented with ongoing paronychia of middle finger.  Returned for further treatment due to symptoms.  Currently taking Augmentin for 3 days and has been doing finger soaks as directed.  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    Limitations of the [facility] explained to patient regarding ability to perform proper I&D.  Offered to do simple I&D with ice pack and sterile needle or can go to IC for further evaluation and treatment.     Site recommendation: [facility]    Accompanied by: self    Patient verbalized understanding of rationale for further evaluation and was stable upon discharge.  No charge for visit.    There were no vitals taken for this visit.

## 2025-03-30 NOTE — ED INITIAL ASSESSMENT (HPI)
R 2nd finger pain x5 days. Seen in [facility] 3/27 who prescribed abx. Symptoms persist today.

## 2025-04-01 NOTE — TELEPHONE ENCOUNTER
Endocrine Refill protocol for oral and injectable diabetic medications    Protocol Criteria:  PASSED      If all below requirements are met, send a 90-day supply with 1 refill per provider protocol.    Verify appointment with Endocrinology completed in the last 6 months or scheduled in the next 3 months.  Verify A1C has been completed within the last 6 months and is below 8.5%     Last completed office visit: 11/4/2024 Rochelle Baker MD   Next scheduled Follow up:   Future Appointments   Date Time Provider Department Center   4/16/2025 12:30 PM Rochelle Baker MD [facility]      Last A1c result: Last A1c value was 7.4% done 11/4/2024.

## 2025-04-01 NOTE — TELEPHONE ENCOUNTER
SITagliptin Phosphate (JANUVIA) 50 MG Oral Tab Take 1 tablet (50 mg total) by mouth daily. 30 tablet 5

## 2025-04-03 DIAGNOSIS — E11.65 TYPE 2 DIABETES MELLITUS WITH HYPERGLYCEMIA, UNSPECIFIED WHETHER LONG TERM INSULIN USE (HCC): ICD-10-CM

## 2025-04-03 RX ORDER — PRAVASTATIN SODIUM 40 MG
40 TABLET ORAL NIGHTLY
Qty: 90 TABLET | Refills: 1 | Status: SHIPPED | OUTPATIENT
Start: 2025-04-03

## 2025-04-03 RX ORDER — LISINOPRIL 40 MG/1
40 TABLET ORAL DAILY
Qty: 90 TABLET | Refills: 1 | Status: SHIPPED | OUTPATIENT
Start: 2025-04-03

## 2025-04-10 NOTE — PATIENT INSTRUCTIONS
[facility]  [facility] CARDIOLOGY  [address] [place] [address]  Dept: [phone]  Dept Fax: [phone]    Date:   Patient: Chiki Capone    Blood Pressure Log  Goal blood Pressure <130/80          Provided By: Eliecer Riley MD    Date Blood Pressure Heart Rate Comments   Friday April 11, 2025       Saturday April 12, 2025       William April 13, 2025       Monday April 14, 2025       Tuesday April 15, 2025       Wednesday April 16, 2025       Thursday April 17, 2025       Friday April 18, 2025       Saturday April 19, 2025       William April 20, 2025       Monday April 21, 2025       Tuesday April 22, 2025       Wednesday April 23, 2025       Thursday April 24, 2025       Friday April 25, 2025       Saturday April 26, 2025       William April 27, 2025       Monday April 28, 2025       Tuesday April 29, 2025       Wednesday April 30, 2025       Thursday May 1, 2025       Friday May 2, 2025       Saturday May 3, 2025       William May 4, 2025       Monday May 5, 2025       Tuesday May 6, 2025       Wednesday May 7, 2025       Thursday May 8, 2025       Friday May 9, 2025       Saturday May 10, 2025       William May 11, 2025       Monday May 12, 2025       Tuesday May 13, 2025       Wednesday May 14, 2025       Thursday May 15, 2025       Friday May 16, 2025       Saturday May 17, 2025       William May 18, 2025       Monday May 19, 2025       Tuesday May 20, 2025       Wednesday May 21, 2025       Thursday May 22, 2025       Friday May 23, 2025       Saturday May 24, 2025       William May 25, 2025       Monday May 26, 2025       Tuesday May 27, 2025       Wednesday May 28, 2025       Thursday May 29, 2025       Friday May 30, 2025       Saturday May 31, 2025       William June 1, 2025       Monday June 2, 2025       Tuesday Radha 3, 2025       Wednesday June 4, 2025       Thursday June 5, 2025       Friday June 6, 2025       Saturday June 7, 2025       William June 8, 2025         Ryan Jones's SCREENING SCHEDULE   Tests on this list are recommended by your physician but may not be covered, or covered at this frequency, by your insurer. Please check with your insurance carrier before scheduling to verify coverage.     Lorraine Martinez Covered every 10 years- more often if abnormal Colonoscopy due on 04/01/2021 Update Health Maintenance if applicable    Flex Sigmoidoscopy Screen  Covered every 5 years No results found for this or any previous visit. No flowsheet data found.      Fecal O visit. This may be covered with your prescription benefits, but Medicare does not cover unless Medically needed    Zoster (Not covered by Medicare Part B) No orders found for this or any previous visit.  This may be covered with your pharmacy  prescription

## 2025-04-16 ENCOUNTER — OFFICE VISIT (OUTPATIENT)
Dept: ENDOCRINOLOGY CLINIC | Facility: CLINIC | Age: 75
End: 2025-04-16
Payer: MEDICARE

## 2025-04-16 VITALS
DIASTOLIC BLOOD PRESSURE: 61 MMHG | BODY MASS INDEX: 28.34 KG/M2 | SYSTOLIC BLOOD PRESSURE: 119 MMHG | WEIGHT: 187 LBS | HEART RATE: 54 BPM | HEIGHT: 68 IN

## 2025-04-16 DIAGNOSIS — E11.69 TYPE 2 DIABETES MELLITUS WITH OTHER SPECIFIED COMPLICATION, WITHOUT LONG-TERM CURRENT USE OF INSULIN (HCC): Primary | ICD-10-CM

## 2025-04-16 DIAGNOSIS — E03.8 SUBCLINICAL HYPOTHYROIDISM: ICD-10-CM

## 2025-04-16 DIAGNOSIS — E78.5 DYSLIPIDEMIA: ICD-10-CM

## 2025-04-16 LAB
GLUCOSE BLOOD: 174
HEMOGLOBIN A1C: 6.8 % (ref 4.3–5.6)
TEST STRIP EXPIRATION DATE: NORMAL DATE
TEST STRIP LOT #: NORMAL NUMERIC

## 2025-04-16 PROCEDURE — 82947 ASSAY GLUCOSE BLOOD QUANT: CPT | Performed by: INTERNAL MEDICINE

## 2025-04-16 PROCEDURE — 99213 OFFICE O/P EST LOW 20 MIN: CPT | Performed by: INTERNAL MEDICINE

## 2025-04-16 PROCEDURE — 83036 HEMOGLOBIN GLYCOSYLATED A1C: CPT | Performed by: INTERNAL MEDICINE

## 2025-04-16 RX ORDER — METFORMIN HYDROCHLORIDE 500 MG/1
500 TABLET, EXTENDED RELEASE ORAL
Qty: 270 TABLET | Refills: 1 | Status: SHIPPED | OUTPATIENT
Start: 2025-04-16

## 2025-04-16 NOTE — PROGRESS NOTES
Return Office Visit     CHIEF COMPLAINT:    DM    Dyslipidemia  Subclinical Hypothyroidism      HISTORY OF PRESENT ILLNESS:  Rob Jones is a 74 year old male who presents for follow up for DM.       DM HISTORY:  Diagnosed in 2000       HISTORY OF DIABETES COMPLICATIONS: :  History of Retinopathy: No - last eye exam:  June 2023, he will make an appointment soon with Dr Guaman   History of Neuropathy: No  History of Nephropathy: No    ASSOCIATED COMPLICATIONS:    HTN: Yes  Hyperlipidemia: Yes  Coronary Artery Disease:  No  Cerebrovascular Disease: No      HOME GLUCOSE READINGS:   Fasting/ pre dinner 120-130    Has had some low BG in the 60s     CURRENT DIABETIC MEDICATIONS INCLUDE:    MTF  mg TID, had SE with 4 tabs daily   Januvia 50 mg daily  Amaryl 1 mg daily     MEALS:  Three meals a day  Dietary compliance is moderate      EXERCISE:  no      CURRENT MEDICATION:    Current Outpatient Medications   Medication Sig Dispense Refill    metFORMIN  MG Oral Tablet 24 Hr Take 1 tablet (500 mg total) by mouth 3 (three) times daily before meals. 270 tablet 1    PRAVASTATIN 40 MG Oral Tab TAKE 1 TABLET(40 MG) BY MOUTH EVERY NIGHT 90 tablet 1    LISINOPRIL 40 MG Oral Tab TAKE 1 TABLET(40 MG) BY MOUTH DAILY 90 tablet 1    amLODIPine 5 MG Oral Tab Take 1 tablet (5 mg total) by mouth 2 (two) times daily. 180 tablet 3    atenolol 50 MG Oral Tab Take 1 tablet (50 mg total) by mouth 2 (two) times daily. 180 tablet 3    FENOFIBRATE MICRONIZED 134 MG Oral Cap TAKE 1 CAPSULE BY MOUTH DAILY 90 capsule 1    LEVOTHYROXINE 50 MCG Oral Tab TAKE 1 TABLET(50 MCG) BY MOUTH BEFORE BREAKFAST 90 tablet 1    glimepiride 1 MG Oral Tab Take 1 tablet (1 mg total) by mouth daily with breakfast. 90 tablet 1    hydrALAZINE 25 MG Oral Tab Take 1 tablet (25 mg total) by mouth 3 (three) times daily. 270 tablet 1    Icosapent Ethyl 1 g Oral Cap Take 1 capsule by mouth 2 (two) times daily. 180 capsule 3    latanoprost 0.005 % Ophthalmic  Solution INSTILL 1 DROP INTO BOTH EYES EVERY EVEING 3 each 3    acetaminophen 500 MG Oral Tab Take 1 tablet (500 mg total) by mouth every 6 (six) hours as needed for Pain. Takes one at night time. Pt stts he takes for sleep aid      Glucose Blood (FREESTYLE LITE TEST) In Vitro Strip Use test strips to check blood sugar 3 times per day as directed. 300 strip 1    FreeStyle Lancets Does not apply Misc Use lancets to check blood sugar 3 times per day as directed. 300 each 1         ALLERGY:  No Known Allergies      PAST MEDICAL, SOCIAL AND FAMILY HISTORY:  See past medical history marked as reviewed.  See past surgical history marked as reviewed.  See past family history marked as reviewed.  See past social history marked as reviewed.    ASSESSMENTS:     REVIEW OF SYSTEMS:  Constitutional: Negative for: weight change, fever, fatigue, cold/heat intolerance  Eyes: Negative for:  Visual changes, proptosis, blurring  ENT: Negative for:  dysphagia, neck swelling, dysphonia  Respiratory: Negative for:  dyspnea, cough  Cardiovascular: Negative for:  chest pain, palpitations, orthopnea  GI: Negative for:  abdominal pain, nausea, vomiting, diarrhea, constipation, bleeding  Neurology: Negative for: headache, numbness, weakness  Genito-Urinary: Negative for: dysuria, frequency  Psychiatric: Negative for:  depression, anxiety  Hematology/Lymphatics: Negative for: bruising, lower extremity edema  Endocrine: Negative for: polyuria, polydypsia  Skin: Negative for: rash, blister, cellulitis,       PHYSICAL EXAM:   Vitals reviewed    General Appearance:  alert, well developed, in no acute distress  Head: Atraumatic  Eyes:  normal conjunctivae, sclera., normal sclera and normal pupils  Throat/Neck: normal sound to voice. Normal hearing, normal speech  Respiratory:  Speaking in full sentences, non-labored. no increased work of breathing, no audible wheezing    Neuro: motor grossly intact, moving all extremities without  difficulty  Psychiatric:  oriented to time, self, and place  Extremities: Nov 2024   Bilateral barefoot skin diabetic exam is normal, visualized feet and the appearance is normal.  Bilateral monofilament/sensation of both feet is normal.  Pulsation pedal pulse exam of both lower legs/feet is normal as well.              DATA:         A1c 6.8 % ( 4/2025 )  Reviewed labs    ASSESSMENT AND PLAN:    1. Type 2 DM:      Plan:  Discussed the pathogenesis, natural course of diabetes. Patient understands the importance of glycemic control and the implications of uncontrolled diabetes including Diabetic ketoacidosis and various micro vascular and macrovascular complications.      a). Medications:    Amaryl 1 mg daily with BF   Take with food  Reviewed SE of hypoglycemia    C/w MTF ER 1000 mg TID with meals  Take with food    STOP Januvia       SGLT2 inhibitors and actos: will avoid given h/o bladder cancer    Reviewed exercise  Continue to work on low carb diet        b). Recent  MA normal, will monitor. Good Bg and BP control, will repeat   c). Instructed on importance of annual eye exams.   d). Foot exam: Daily feet exam explained  e). BG log maintainence explained in great detail, to get log and glucometer on next visit.  f). Life style changes discussed  g). Hypoglycemia recognition and management discussed    2.Dyslipidemia  A) Discussed lifestyle modifications including reductions in dietary total and saturated fat, weight loss, aerobic exercise, and eating a diet rich in fruits and vegetables.  B) c/w simvastatin.  C) Fasting lipid panel reviewed  LDL cholesterol is okay   However, his TG level is elevated   Discussed that when these are over 500, they can increase risk of pancreatitis  C/w fenofibrate and monitor labs in addition to a low fat diet and daily exercise  Fenofibrate 134 mg daily   Fasting lipid panel  : will monitor  3. BP is normal  4. Subclinical Hypothyroidism  On LT4 50 mcg daily  Reports compliance  and takes it empty stomach  CPM    RTC in 4 months.   Check and call with sugars as discussed

## 2025-05-08 ENCOUNTER — LAB ENCOUNTER (OUTPATIENT)
Dept: LAB | Facility: HOSPITAL | Age: 75
End: 2025-05-08
Attending: INTERNAL MEDICINE
Payer: MEDICARE

## 2025-05-08 DIAGNOSIS — E11.69 TYPE 2 DIABETES MELLITUS WITH OTHER SPECIFIED COMPLICATION, WITHOUT LONG-TERM CURRENT USE OF INSULIN (HCC): ICD-10-CM

## 2025-05-08 LAB
CREAT UR-SCNC: 71.4 MG/DL
MICROALBUMIN UR-MCNC: 1.6 MG/DL
MICROALBUMIN/CREAT 24H UR-RTO: 22.4 UG/MG (ref ?–30)

## 2025-05-08 PROCEDURE — 82570 ASSAY OF URINE CREATININE: CPT

## 2025-05-08 PROCEDURE — 82043 UR ALBUMIN QUANTITATIVE: CPT

## 2025-05-22 ENCOUNTER — OFFICE VISIT (OUTPATIENT)
Dept: DERMATOLOGY CLINIC | Facility: CLINIC | Age: 75
End: 2025-05-22
Payer: MEDICARE

## 2025-05-22 DIAGNOSIS — L82.1 SEBORRHEIC KERATOSES: Primary | ICD-10-CM

## 2025-05-22 DIAGNOSIS — D18.01 CHERRY ANGIOMA: ICD-10-CM

## 2025-05-22 DIAGNOSIS — R20.2 NOTALGIA PARESTHETICA: ICD-10-CM

## 2025-05-22 DIAGNOSIS — D22.9 MULTIPLE BENIGN NEVI: ICD-10-CM

## 2025-05-22 DIAGNOSIS — L81.4 LENTIGINES: ICD-10-CM

## 2025-05-22 DIAGNOSIS — L57.0 MULTIPLE ACTINIC KERATOSES: ICD-10-CM

## 2025-05-22 DIAGNOSIS — L30.9 ECZEMA, UNSPECIFIED TYPE: ICD-10-CM

## 2025-05-22 PROCEDURE — 99214 OFFICE O/P EST MOD 30 MIN: CPT | Performed by: STUDENT IN AN ORGANIZED HEALTH CARE EDUCATION/TRAINING PROGRAM

## 2025-05-22 PROCEDURE — 17000 DESTRUCT PREMALG LESION: CPT | Performed by: STUDENT IN AN ORGANIZED HEALTH CARE EDUCATION/TRAINING PROGRAM

## 2025-05-22 PROCEDURE — 17003 DESTRUCT PREMALG LES 2-14: CPT | Performed by: STUDENT IN AN ORGANIZED HEALTH CARE EDUCATION/TRAINING PROGRAM

## 2025-05-22 RX ORDER — CLOBETASOL PROPIONATE 0.5 MG/G
1 CREAM TOPICAL 2 TIMES DAILY
Qty: 60 G | Refills: 3 | Status: SHIPPED | OUTPATIENT
Start: 2025-05-22 | End: 2026-05-22

## 2025-05-22 NOTE — PROGRESS NOTES
Established patient     CHIEF COMPLAINT: FBSE    HISTORY OF PRESENT ILLNESS: .    No particular areas of concerns at this time      DERM HISTORY:  History of skin cancer: Yes-BCC-Next to Rt Nostril nare    FAMILY HISTORY:  History of melanoma: No    PAST MEDICAL HISTORY:  Past Medical History:    Asthma (HCC)    BCC (basal cell carcinoma)    right medial cheek    Bell's palsy    Calculus of kidney    Cancer (HCC)    Bladder cancer. Patient of Dr. Ryan    Colon polyp    Diabetes (HCC)    Essential hypertension    Glaucoma    12/31/20 1st visit w/ GISELLEM, patient was on Latanoprost qhs OU since around 2015 per Dr. Guzman     High blood pressure    High cholesterol    Hyperlipidemia    Kidney stone    Pyloric stenosis (HCC)    Gavino Hunt syndrome (geniculate herpes zoster)    Rx with prednisone    Screen for colon cancer    repeat CLN in 5-7 years    Skin cancer    left shoulder        REVIEW OF SYSTEMS:  Constitutional: Denies fever, chills, unintentional weight loss.   Skin as per HPI    Medications  Current Outpatient Medications   Medication Sig Dispense Refill    metFORMIN  MG Oral Tablet 24 Hr Take 1 tablet (500 mg total) by mouth 3 (three) times daily before meals. 270 tablet 1    PRAVASTATIN 40 MG Oral Tab TAKE 1 TABLET(40 MG) BY MOUTH EVERY NIGHT 90 tablet 1    LISINOPRIL 40 MG Oral Tab TAKE 1 TABLET(40 MG) BY MOUTH DAILY 90 tablet 1    amLODIPine 5 MG Oral Tab Take 1 tablet (5 mg total) by mouth 2 (two) times daily. 180 tablet 3    atenolol 50 MG Oral Tab Take 1 tablet (50 mg total) by mouth 2 (two) times daily. 180 tablet 3    FENOFIBRATE MICRONIZED 134 MG Oral Cap TAKE 1 CAPSULE BY MOUTH DAILY 90 capsule 1    LEVOTHYROXINE 50 MCG Oral Tab TAKE 1 TABLET(50 MCG) BY MOUTH BEFORE BREAKFAST 90 tablet 1    glimepiride 1 MG Oral Tab Take 1 tablet (1 mg total) by mouth daily with breakfast. 90 tablet 1    hydrALAZINE 25 MG Oral Tab Take 1 tablet (25 mg total) by mouth 3 (three) times daily. 270 tablet 1     Icosapent Ethyl 1 g Oral Cap Take 1 capsule by mouth 2 (two) times daily. 180 capsule 3    latanoprost 0.005 % Ophthalmic Solution INSTILL 1 DROP INTO BOTH EYES EVERY EVEING 3 each 3    acetaminophen 500 MG Oral Tab Take 1 tablet (500 mg total) by mouth every 6 (six) hours as needed for Pain. Takes one at night time. Pt stts he takes for sleep aid      Glucose Blood (FREESTYLE LITE TEST) In Vitro Strip Use test strips to check blood sugar 3 times per day as directed. 300 strip 1    FreeStyle Lancets Does not apply Misc Use lancets to check blood sugar 3 times per day as directed. 300 each 1       PHYSICAL EXAM:  Patient declined chaperone   General: awake, alert, no acute distress  Skin: Skin exam was performed today including the following: head and face, scalp, neck, chest (including breasts and axillae), abdomen, back, bilateral upper extremities, bilateral lower extremities, hands, feet, digits, nails. Pertinent findings include:   - Scattered bright red-purple dome-shaped papules on the trunk and extremities   - Scattered light brown stellate macules on sun exposed sites  - Scattered, evenly colored, round brown macules and papules with regular borders on the trunk and extremities  - Numerous scattered skin-colored and brown, waxy, stuck-on papules and plaques on the trunk and extremities  - frontal scalp with a pink gritty papule  - R shin with pink scaly plaques  - R forehead with a pink gritty papule  - Nasal dorsum with a pink gritty papule    ASSESSMENT & PLAN:  Pathophysiology of diagnoses discussed with patient.  Therapeutic options reviewed. Risks, benefits, and alternatives discussed with patient. Instructions reviewed at length.    #Lentigines  #Seborrheic keratoses   #Cherry angiomas   - Reassurance provided regarding the benign nature of these lesions.    #Multiple benign nevi  - Complete skin exam performed today with no outlier lesions identified   - Reassured patient of benign nature of these  lesions.   - Recommend daily photoprotection with broad-spectrum sunscreen, avoidance of sun during peak hours, and sun protective clothing.    - Dermoscopy was used for physical examination of pigmented lesions during today's office visit.    #Multiple actinic keratoses  - Discussed premalignant etiology and possibility of transformation to SCC  - Recommended cryotherapy today   - Discussed side effects including redness, swelling, crusting, and discolortion after treatment, wound care with soap/water and vaseline   - Recommend sun protection with spf 30 or higher, sun protective clothing such as wide brimmed hats and long sleeves. Recommend avoiding midday sun (10 am- 3 pm).     - Procedure Note Cryosurgery of pre-malignant lesion(s)  Risks, benefits, alternatives, complications, and personnel required for cryosurgery reviewed with patient. Patient verbalizes understanding and wishes to proceed.   - Cryosurgery performed with Liquid Nitrogen via cryostat spray gun to Actinic Keratosis . 2 lesion(s) treated.   - Patient tolerated well and wound care discussed. Return if lesions fail to fully resolve.      #Eczematous Dermatitis, R shin   - clobetasol 0.05% twice daily to affected areas Monday-Friday. Take weekends off. Avoid use on face, breasts, groin, or axillae.      #Notalgia paresthetica  - Discussed association with cervical spine issues and natural history of this condition.  - Discussed Cerave itch relief, Sarna, or Sarna for Sensitive Skin (over-the-counter) as needed.       #History of nonmelanoma skin cancer- Basal Cell Carcinoma   - No evidence of recurrence on exam. Continued monitoring. Regular skin exams discussed given increased risk of subsequent cutaneous malignancies.   - Should any areas change in size, shape or color, bleed or become tender, the patient will contact the office for evaluation sooner than their interval appointment.       Return to clinic: 6 months or sooner if something  concerning arises     Alex Zuluaga MD    By signing my name below, I, Rambo SINCLAIR MA,  attest that this documentation has been prepared under the direction and in the presence of Alex Zuluaga MD.   Electronically Signed: Rambo SINCLAIR MA, 5/22/2025, 8:36 AM.    I, Alex Zuluaga MD,  personally performed the services described in this documentation. All medical record entries made by the scribe were at my direction and in my presence.  I have reviewed the chart and agree that the record reflects my personal performance and is accurate and complete.  Alex Zuluaga MD, 5/22/2025, 9:14 AM

## 2025-05-28 ENCOUNTER — PATIENT MESSAGE (OUTPATIENT)
Dept: DERMATOLOGY CLINIC | Facility: CLINIC | Age: 75
End: 2025-05-28

## 2025-05-28 NOTE — TELEPHONE ENCOUNTER
Spoke with pt and provided pt with the following Goodrx coupon numbers:    BIN [id number]  [address] [facility]  Group [id number]  Member ID [id number]    Price quoted on Goodrx is $30.94 for Clobetasol 0.05% Cream - 60 g.  Pt verbalized understanding and states he will try again.

## 2025-06-16 RX ORDER — ICOSAPENT ETHYL 1 G/1
1 CAPSULE ORAL 2 TIMES DAILY
Qty: 180 CAPSULE | Refills: 3 | Status: SHIPPED | OUTPATIENT
Start: 2025-06-16

## 2025-06-16 NOTE — TELEPHONE ENCOUNTER
Refill passes per [facility] protocol.    Future Appointments   Date Time Provider Department Center   6/18/2025 10:20 AM Floyd Coelho MD [facility]

## 2025-06-18 ENCOUNTER — OFFICE VISIT (OUTPATIENT)
Dept: INTERNAL MEDICINE CLINIC | Facility: CLINIC | Age: 75
End: 2025-06-18
Payer: MEDICARE

## 2025-06-18 VITALS
WEIGHT: 185 LBS | TEMPERATURE: 98 F | DIASTOLIC BLOOD PRESSURE: 60 MMHG | HEIGHT: 68 IN | HEART RATE: 56 BPM | OXYGEN SATURATION: 98 % | BODY MASS INDEX: 28.04 KG/M2 | RESPIRATION RATE: 18 BRPM | SYSTOLIC BLOOD PRESSURE: 130 MMHG

## 2025-06-18 DIAGNOSIS — I65.29 CAROTID ATHEROSCLEROSIS, UNSPECIFIED LATERALITY: ICD-10-CM

## 2025-06-18 DIAGNOSIS — G31.84 MCI (MILD COGNITIVE IMPAIRMENT): ICD-10-CM

## 2025-06-18 DIAGNOSIS — I10 ESSENTIAL HYPERTENSION: Primary | ICD-10-CM

## 2025-06-18 DIAGNOSIS — E78.5 HYPERLIPIDEMIA, UNSPECIFIED HYPERLIPIDEMIA TYPE: ICD-10-CM

## 2025-06-18 DIAGNOSIS — E11.9 TYPE 2 DIABETES MELLITUS WITHOUT COMPLICATION, UNSPECIFIED WHETHER LONG TERM INSULIN USE (HCC): ICD-10-CM

## 2025-06-18 RX ORDER — HYDRALAZINE HYDROCHLORIDE 25 MG/1
25 TABLET, FILM COATED ORAL 4 TIMES DAILY
Qty: 360 TABLET | Refills: 1 | Status: SHIPPED | OUTPATIENT
Start: 2025-06-18

## 2025-06-18 NOTE — PROGRESS NOTES
HPI:    Patient ID: Rob Jones is a 74 year old male.    HPI    Patient returns to the office today to discuss chronic medical issues as listed on the active problem list below.  Patient last seen in the office by me on December 11 of last year for annual wellness visit.  Blood pressure was elevated.  During the last visit, the following changes were made: Change hydralazine from 10 mg 4 times a day over to 25 mg 3 times a day.  Full blood work was done at that time and was essentially unremarkable.  Since the last visit, the patient has seen the following doctors: Endocrinology for diabetes.  Last A1c on April 16 was 6.8. He was taken off Januvia. Also saw podiatry, ENT, dermatology.      Today, the patient offers the following complaints: no major issues. Vision is becoming an issue with cataracts; likely surgery soon.  Patient does check sugar at home. It has been running 112-159; they are worse since stopping Januvia.  Patient does check blood pressure at home. It has been running 135-157/60s; frequently above 140/--..   Memory is still an issue. He struggles with people's names (even people he has known for a while).   Patient describes diet as ok. Still with salt intake.   For exercise, the patient is on his feet a lot. Still works as a .   Tobacco and alcohol use reviewed. One glass of wine a week.   Current medications reviewed.   Health maintenance issues reviewed.    Patient recently had [employer] screening done on June 6.  Screening for abdominal aortic aneurysm showed no evidence of any abnormal aneurysm or increase in size.  Screening for atrial fibrillation was negative.  Screening of the carotid arteries was normal on the left and mild on the right.  Mild being explained as a plaque that exist but does not affect the blood flow.  Screening for peripheral artery disease was normal/negative.    Wt Readings from Last 6 Encounters:   06/18/25 185 lb (83.9 kg)   04/16/25 187 lb (84.8 kg)    03/27/25 186 lb 11.2 oz (84.7 kg)   02/20/25 188 lb (85.3 kg)   12/11/24 188 lb (85.3 kg)   11/18/24 186 lb 4 oz (84.5 kg)       Problem List[1]     HISTORY:  Past Medical History[2]   Past Surgical History[3]   Family History[4]   Short Social Hx on File[5]       Review of Systems        Current Medications[6]  Allergies:Allergies[7]     PHYSICAL EXAM:   /62 (BP Location: Left arm, Patient Position: Sitting, Cuff Size: large)   Pulse 56   Temp 97.8 °F (36.6 °C) (Other)   Resp 18   Ht 5' 8\" (1.727 m)   Wt 185 lb (83.9 kg)   SpO2 98%   BMI 28.13 kg/m²      Physical Exam  Constitutional:       Appearance: Normal appearance. He is well-developed.   HENT:      Nose: Nose normal.      Mouth/Throat:      Pharynx: No oropharyngeal exudate or posterior oropharyngeal erythema.   Eyes:      Conjunctiva/sclera: Conjunctivae normal.   Neck:      Vascular: No carotid bruit.   Cardiovascular:      Rate and Rhythm: Normal rate and regular rhythm.      Pulses: Normal pulses.      Heart sounds: Normal heart sounds. No murmur heard.  Pulmonary:      Effort: Pulmonary effort is normal.      Breath sounds: Normal breath sounds. No wheezing or rales.   Abdominal:      General: Bowel sounds are normal.      Palpations: Abdomen is soft. There is no mass.      Tenderness: There is no abdominal tenderness.   Musculoskeletal:      Right lower leg: No edema.      Left lower leg: No edema.   Lymphadenopathy:      Cervical: No cervical adenopathy.   Skin:     General: Skin is warm and dry.      Findings: No rash.   Neurological:      General: No focal deficit present.      Mental Status: He is alert.   Psychiatric:         Mood and Affect: Mood normal.         Behavior: Behavior normal.         Thought Content: Thought content normal.                 ASSESSMENT/PLAN:   1. Essential hypertension  Blood pressure borderline controlled both at home and here in the office.  Various options discussed.  We will increase the hydralazine  25 mg up to 3 times a day up to 4 times a day.  Continue other medication.  Check blood pressure and contact results.  Follow-up in 6 months.  - hydrALAZINE 25 MG Oral Tab; Take 1 tablet (25 mg total) by mouth 4 (four) times daily.  Dispense: 360 tablet; Refill: 1    2. Type 2 diabetes mellitus without complication, unspecified whether long term insulin use (HCC)  Well-controlled.  Last A1c was 6.8.  Now he is off the Januvia.  Blood sugars are little bit higher.  Follow-up with endocrinology.    3. Hyperlipidemia, unspecified hyperlipidemia type  Stable.  Continue current treatment.    4. MCI (mild cognitive impairment)  Patient never followed up with neurology.  He has history of Alzheimer's in the family.  Does note some worsening memory issues.  Referred back to neurology.    5. Carotid atherosclerosis, unspecified laterality  Had recent Lifeline screening as above.  No significant narrowing of the arteries.         Meds This Visit:  Requested Prescriptions     Pending Prescriptions Disp Refills    hydrALAZINE 25 MG Oral Tab 270 tablet 1     Sig: Take 1 tablet (25 mg total) by mouth 3 (three) times daily.       Imaging & Referrals:  None         Floyd Coelho MD        [1]   Patient Active Problem List  Diagnosis    Sensorineural hearing loss, bilateral    Type 2 diabetes mellitus without retinopathy (HCC)    Essential hypertension    Hyperlipidemia    History of kidney stones    Bladder tumor    Primary open angle glaucoma of both eyes, mild stage    Age-related nuclear cataract of both eyes    Floaters in visual field, bilateral    Blepharitis of upper and lower eyelids of both eyes    Myopia with astigmatism and presbyopia, bilateral    Polyp of colon    Type 2 diabetes mellitus with other specified complication, unspecified whether long term insulin use (HCC)   [2]   Past Medical History:   Asthma (HCC)    BCC (basal cell carcinoma)    right medial cheek    Bell's palsy    Calculus of kidney    Cancer  (HCC)    Bladder cancer. Patient of Dr. Ryan    Colon polyp    Diabetes (HCC)    Essential hypertension    Glaucoma    20 1st visit w/ MARIBEL, patient was on Latanoprost qhs OU since around  per Dr. Guzman     High blood pressure    High cholesterol    Hyperlipidemia    Kidney stone    Pyloric stenosis (HCC)    Flint Hunt syndrome (geniculate herpes zoster)    Rx with prednisone    Screen for colon cancer    repeat CLN in 5-7 years    Skin cancer    left shoulder    [3]   Past Surgical History:  Procedure Laterality Date    Colonoscopy      Colonoscopy      Polyps removed, I have a colonoscopy every 5 years.    Colonoscopy N/A 2021    Procedure: COLONOSCOPY;  Surgeon: YAHIR Marc MD;  Location: [facility] ENDOSCOPY    Tonsillectomy     [4]   Family History  Problem Relation Age of Onset    Other (Other) Father          of stroke    Other (Other) Mother         Alzheimer's Disease    Diabetes Sister     Glaucoma Neg     Macular degeneration Neg    [5]   Social History  Socioeconomic History    Marital status:    Tobacco Use    Smoking status: Never     Passive exposure: Never    Smokeless tobacco: Never    Tobacco comments:     Second-hand smoke from smoking parents and college roommate   Vaping Use    Vaping status: Never Used   Substance and Sexual Activity    Alcohol use: Yes     Comment: 1-2 drinks per month    Drug use: Never    Sexual activity: Not Currently     Partners: Female   Other Topics Concern    Grew up on a farm No    History of tanning Yes    Outdoor occupation No    Reaction to local anesthetic No    Pt has a pacemaker No    Pt has a defibrillator No     Social Drivers of Health     Food Insecurity: No Food Insecurity (2025)    NCSS - Food Insecurity     Worried About Running Out of Food in the Last Year: No     Ran Out of Food in the Last Year: No   Transportation Needs: No Transportation Needs (2025)    NCSS - Transportation     Lack of Transportation: No    Housing Stability: Not At Risk (6/18/2025)    NCSS - Housing/Utilities     Has Housing: Yes     Worried About Losing Housing: No     Unable to Get Utilities: No   [6]   Current Outpatient Medications   Medication Sig Dispense Refill    Icosapent Ethyl 1 g Oral Cap Take 1 capsule by mouth 2 (two) times daily. 180 capsule 3    clobetasol 0.05 % External Cream Apply 1 Application topically 2 (two) times daily. Apply twice daily Monday-Friday. Take weekends off. Use on affected areas. Do not use on face, groin, or armpits. 60 g 3    metFORMIN  MG Oral Tablet 24 Hr Take 1 tablet (500 mg total) by mouth 3 (three) times daily before meals. 270 tablet 1    PRAVASTATIN 40 MG Oral Tab TAKE 1 TABLET(40 MG) BY MOUTH EVERY NIGHT 90 tablet 1    LISINOPRIL 40 MG Oral Tab TAKE 1 TABLET(40 MG) BY MOUTH DAILY 90 tablet 1    amLODIPine 5 MG Oral Tab Take 1 tablet (5 mg total) by mouth 2 (two) times daily. 180 tablet 3    atenolol 50 MG Oral Tab Take 1 tablet (50 mg total) by mouth 2 (two) times daily. 180 tablet 3    FENOFIBRATE MICRONIZED 134 MG Oral Cap TAKE 1 CAPSULE BY MOUTH DAILY 90 capsule 1    LEVOTHYROXINE 50 MCG Oral Tab TAKE 1 TABLET(50 MCG) BY MOUTH BEFORE BREAKFAST 90 tablet 1    glimepiride 1 MG Oral Tab Take 1 tablet (1 mg total) by mouth daily with breakfast. 90 tablet 1    hydrALAZINE 25 MG Oral Tab Take 1 tablet (25 mg total) by mouth 3 (three) times daily. 270 tablet 1    latanoprost 0.005 % Ophthalmic Solution INSTILL 1 DROP INTO BOTH EYES EVERY EVEING 3 each 3    acetaminophen 500 MG Oral Tab Take 1 tablet (500 mg total) by mouth every 6 (six) hours as needed for Pain. Takes one at night time. Pt stts he takes for sleep aid      Glucose Blood (FREESTYLE LITE TEST) In Vitro Strip Use test strips to check blood sugar 3 times per day as directed. 300 strip 1    FreeStyle Lancets Does not apply Misc Use lancets to check blood sugar 3 times per day as directed. 300 each 1   [7] No Known Allergies

## 2025-06-23 RX ORDER — GLIMEPIRIDE 1 MG/1
1 TABLET ORAL
Qty: 90 TABLET | Refills: 1 | Status: SHIPPED | OUTPATIENT
Start: 2025-06-23

## 2025-06-23 NOTE — TELEPHONE ENCOUNTER
Endocrine Refill protocol for oral and injectable diabetic medications    Protocol Criteria:  PASSED  Reason: N/A    If all below requirements are met, send a 90-day supply with 1 refill per provider protocol.    Verify appointment with Endocrinology completed in the last 6 months or scheduled in the next 3 months.  Verify A1C has been completed within the last 6 months and is below 8.5%     Last completed office visit: 4/16/2025 Rochelle Baker MD   Next scheduled Follow up:   Future Appointments   Date Time Provider Department Center          8/15/2025 12:00 PM Rochelle Baker MD [facility]                    Last A1c result: Last A1c value was 6.8% done 4/16/2025.

## 2025-06-23 NOTE — TELEPHONE ENCOUNTER
Medication Quantity Refills Start End   glimepiride 1 MG Oral Tab 90 tablet 1 2/18/2025 --   Sig:   Take 1 tablet (1 mg total) by mouth daily with breakfast.

## 2025-07-02 ENCOUNTER — TELEPHONE (OUTPATIENT)
Dept: ENDOCRINOLOGY CLINIC | Facility: CLINIC | Age: 75
End: 2025-07-02

## 2025-07-02 NOTE — TELEPHONE ENCOUNTER
Received fax from [facility] received pt eye exam, exam put in flowsheet & placed in provider folder for review.

## 2025-07-08 ENCOUNTER — OFFICE VISIT (OUTPATIENT)
Dept: PODIATRY CLINIC | Facility: CLINIC | Age: 75
End: 2025-07-08
Payer: MEDICARE

## 2025-07-08 DIAGNOSIS — B35.1 ONYCHOMYCOSIS: ICD-10-CM

## 2025-07-08 DIAGNOSIS — E11.9 TYPE 2 DIABETES MELLITUS WITHOUT COMPLICATION, WITHOUT LONG-TERM CURRENT USE OF INSULIN (HCC): Primary | ICD-10-CM

## 2025-07-08 PROCEDURE — 99213 OFFICE O/P EST LOW 20 MIN: CPT | Performed by: STUDENT IN AN ORGANIZED HEALTH CARE EDUCATION/TRAINING PROGRAM

## 2025-07-08 NOTE — PROGRESS NOTES
[facility] Podiatry  Progress Note      Rob Jones is a 74 year old male.   Chief Complaint   Patient presents with    Diabetic Foot Care     3 month nail care, LOV 4/16/25 with Dr. Baker HbAiC 6.8. Blood sugar 159 yesterday in the AM.              HPI:       Patient is a pleasant 74-year-old diabetic male presents to clinic for bilateral foot evaluation.  Patient admits elongated toenails he is unable to trim himself.  Denies any pedal injuries or trauma.  Denies any signs of infection.      Allergies: Patient has no known allergies.    Current Outpatient Medications   Medication Sig Dispense Refill    glimepiride 1 MG Oral Tab Take 1 tablet (1 mg total) by mouth daily with breakfast. 90 tablet 1    hydrALAZINE 25 MG Oral Tab Take 1 tablet (25 mg total) by mouth 4 (four) times daily. 360 tablet 1    Icosapent Ethyl 1 g Oral Cap Take 1 capsule by mouth 2 (two) times daily. 180 capsule 3    clobetasol 0.05 % External Cream Apply 1 Application topically 2 (two) times daily. Apply twice daily Monday-Friday. Take weekends off. Use on affected areas. Do not use on face, groin, or armpits. 60 g 3    metFORMIN  MG Oral Tablet 24 Hr Take 1 tablet (500 mg total) by mouth 3 (three) times daily before meals. 270 tablet 1    PRAVASTATIN 40 MG Oral Tab TAKE 1 TABLET(40 MG) BY MOUTH EVERY NIGHT 90 tablet 1    LISINOPRIL 40 MG Oral Tab TAKE 1 TABLET(40 MG) BY MOUTH DAILY 90 tablet 1    amLODIPine 5 MG Oral Tab Take 1 tablet (5 mg total) by mouth 2 (two) times daily. 180 tablet 3    atenolol 50 MG Oral Tab Take 1 tablet (50 mg total) by mouth 2 (two) times daily. 180 tablet 3    FENOFIBRATE MICRONIZED 134 MG Oral Cap TAKE 1 CAPSULE BY MOUTH DAILY 90 capsule 1    LEVOTHYROXINE 50 MCG Oral Tab TAKE 1 TABLET(50 MCG) BY MOUTH BEFORE BREAKFAST 90 tablet 1    latanoprost 0.005 % Ophthalmic Solution INSTILL 1 DROP INTO BOTH EYES EVERY EVEING 3 each 3    acetaminophen 500 MG Oral Tab Take 1 tablet (500 mg total) by  mouth every 6 (six) hours as needed for Pain. Takes one at night time. Pt stts he takes for sleep aid      Glucose Blood (FREESTYLE LITE TEST) In Vitro Strip Use test strips to check blood sugar 3 times per day as directed. 300 strip 1    FreeStyle Lancets Does not apply Misc Use lancets to check blood sugar 3 times per day as directed. 300 each 1      Past Medical History:    Asthma (HCC)    BCC (basal cell carcinoma)    right medial cheek    Bell's palsy    Calculus of kidney    Cancer (HCC)    Bladder cancer. Patient of Dr. Ryan    Colon polyp    Diabetes (HCC)    Essential hypertension    Glaucoma    20 1st visit w/ MARIBEL, patient was on Latanoprost qhs OU since around  per Dr. Guzman     High blood pressure    High cholesterol    Hyperlipidemia    Kidney stone    Pyloric stenosis (HCC)    Gavino Hunt syndrome (geniculate herpes zoster)    Rx with prednisone    Screen for colon cancer    repeat CLN in 5-7 years    Skin cancer    left shoulder       Past Surgical History:   Procedure Laterality Date    Colonoscopy      Colonoscopy      Polyps removed, I have a colonoscopy every 5 years.    Colonoscopy N/A 2021    Procedure: COLONOSCOPY;  Surgeon: YAHIR Marc MD;  Location: [facility] ENDOSCOPY    Tonsillectomy        Family History   Problem Relation Age of Onset    Other (Other) Father          of stroke    Other (Other) Mother         Alzheimer's Disease    Diabetes Sister     Glaucoma Neg     Macular degeneration Neg       Social History     Socioeconomic History    Marital status:    Tobacco Use    Smoking status: Never     Passive exposure: Never    Smokeless tobacco: Never    Tobacco comments:     Second-hand smoke from smoking parents and college roommate   Vaping Use    Vaping status: Never Used   Substance and Sexual Activity    Alcohol use: Yes     Comment: 1-2 drinks per month    Drug use: Never    Sexual activity: Not Currently     Partners: Female   Other Topics Concern     Grew up on a farm No    History of tanning Yes    Outdoor occupation No    Reaction to local anesthetic No    Pt has a pacemaker No    Pt has a defibrillator No           REVIEW OF SYSTEMS:     Denies nause, fever, chills  No calf pain  Denies chest pain or SOB      EXAM:   There were no vitals taken for this visit.  GENERAL: well developed, well nourished, in no apparent distress  EXTREMITIES:   1. Integument: Normal skin temperature and turgor. Toenails x10 are elongated, thickened and discolored with subungal derbi.  No signs of infection to chelsea feet  2. Vascular: Dorsalis pedis two out of four bilateral and posterior tibial pulses two out of   four bilateral, capillary refill normal.   3. Musculoskeletal: All muscle groups are graded 5 out of 5 in the foot and ankle.   4. Neurological: Normal sharp dull sensation; reflexes normal.    Bilateral barefoot skin diabetic exam is normal, visualized feet and the appearance is normal.  Bilateral monofilament/sensation of both feet is normal.  Pulsation pedal pulse exam of both lower legs/feet is normal as well.             ASSESSMENT AND PLAN:   Diagnoses and all orders for this visit:    Type 2 diabetes mellitus without complication, without long-term current use of insulin ([facility])    Onychomycosis              Plan:       -Patient examined, chart history reviewed.  -Discussed importance of proper pedal hygiene, regular foot checks, and tight glucose control.  -Sharply debrided nails x 10 with a sterile nail nipper achieving a 20% reduction in thickness and length, without incident.   -Ambulate with supportive shoes and inserts and avoid walking barefoot.  -Educated patient on acute signs of infection advised patient to seek immediate medical attention if symptoms arise.      RTC in 3 months      The patient indicates understanding of these issues and agrees to the plan.        Mercedes Ordoñez DPM

## 2025-08-15 ENCOUNTER — OFFICE VISIT (OUTPATIENT)
Dept: ENDOCRINOLOGY CLINIC | Facility: CLINIC | Age: 75
End: 2025-08-15

## 2025-08-15 VITALS
HEART RATE: 52 BPM | DIASTOLIC BLOOD PRESSURE: 66 MMHG | BODY MASS INDEX: 28.31 KG/M2 | WEIGHT: 186.81 LBS | SYSTOLIC BLOOD PRESSURE: 137 MMHG | HEIGHT: 68 IN | RESPIRATION RATE: 16 BRPM

## 2025-08-15 DIAGNOSIS — E11.69 TYPE 2 DIABETES MELLITUS WITH OTHER SPECIFIED COMPLICATION, WITHOUT LONG-TERM CURRENT USE OF INSULIN (HCC): Primary | ICD-10-CM

## 2025-08-15 DIAGNOSIS — E03.8 SUBCLINICAL HYPOTHYROIDISM: ICD-10-CM

## 2025-08-15 DIAGNOSIS — E78.5 DYSLIPIDEMIA: ICD-10-CM

## 2025-08-15 LAB
GLUCOSE BLOOD: 185
HEMOGLOBIN A1C: 7.4 % (ref 4.3–5.6)
TEST STRIP LOT #: NORMAL NUMERIC

## 2025-08-15 PROCEDURE — 82947 ASSAY GLUCOSE BLOOD QUANT: CPT | Performed by: INTERNAL MEDICINE

## 2025-08-15 PROCEDURE — 83036 HEMOGLOBIN GLYCOSYLATED A1C: CPT | Performed by: INTERNAL MEDICINE

## 2025-08-15 PROCEDURE — 99214 OFFICE O/P EST MOD 30 MIN: CPT | Performed by: INTERNAL MEDICINE

## 2025-08-15 RX ORDER — GLIMEPIRIDE 1 MG/1
1 TABLET ORAL
Qty: 90 TABLET | Refills: 1 | Status: SHIPPED | OUTPATIENT
Start: 2025-08-15

## 2025-08-18 ENCOUNTER — TELEPHONE (OUTPATIENT)
Age: 75
End: 2025-08-18

## 2025-08-19 ENCOUNTER — LAB ENCOUNTER (OUTPATIENT)
Dept: LAB | Facility: HOSPITAL | Age: 75
End: 2025-08-19
Attending: Other

## 2025-08-19 DIAGNOSIS — G31.84 MCI (MILD COGNITIVE IMPAIRMENT) WITH MEMORY LOSS: ICD-10-CM

## 2025-08-19 LAB
T PALLIDUM AB SER QL IA: NONREACTIVE
TSI SER-ACNC: 2.39 UIU/ML (ref 0.55–4.78)

## 2025-08-19 PROCEDURE — 83520 IMMUNOASSAY QUANT NOS NONAB: CPT

## 2025-08-19 PROCEDURE — 86780 TREPONEMA PALLIDUM: CPT

## 2025-08-19 PROCEDURE — 84443 ASSAY THYROID STIM HORMONE: CPT

## 2025-08-19 PROCEDURE — 36415 COLL VENOUS BLD VENIPUNCTURE: CPT

## 2025-08-21 LAB
BETA-AMYLOID 40: 234.06 PG/ML
BETA-AMYLOID 40: 234.06 PG/ML
BETA-AMYLOID 42/40 RATIO: 0.11
BETA-AMYLOID 42/40 RATIO: 0.11
BETA-AMYLOID 42: 25.23 PG/ML
BETA-AMYLOID 42: 25.23 PG/ML

## 2025-08-23 LAB
PHOSPHORYLATED TAU 217 (P-TAU217), PLASMA: 0.36 PG/ML
PHOSPHORYLATED TAU 217 (P-TAU217), PLASMA: 0.36 PG/ML

## (undated) DEVICE — Device: Brand: CUSTOM PROCEDURE KIT

## (undated) DEVICE — CATH URTH BDX IC 20FR FL 3

## (undated) DEVICE — SOL  .9 1000ML BTL

## (undated) DEVICE — URINE DRAINAGE BAG,NEEDLE SAMPLING, ANTI-REFLUX DEVICE, DRAIN TUBE: Brand: DOVER

## (undated) DEVICE — MASK PROC W/VISOR ANTIGLARE

## (undated) DEVICE — SOL H2O 3000ML IRRIG

## (undated) DEVICE — MEDI-VAC NON-CONDUCTIVE SUCTION TUBING: Brand: CARDINAL HEALTH

## (undated) DEVICE — STERILE SURGICAL LUBRICANT, METAL TUBE: Brand: SURGILUBE

## (undated) DEVICE — LINE MNTR ADLT SET O2 INTMD

## (undated) DEVICE — NON-ADHERENT PADS PREPACK: Brand: TELFA

## (undated) DEVICE — SOL  .9 3000ML

## (undated) DEVICE — FORCEP RADIAL JAW 4

## (undated) DEVICE — SOL H2O 1000ML BTL

## (undated) DEVICE — CYSTO PACK: Brand: MEDLINE INDUSTRIES, INC.

## (undated) DEVICE — SNARE OPTMZ PLPCTM TRP

## (undated) DEVICE — SNARE ENDOSCOPIC 10MM ROUND

## (undated) DEVICE — MEDI-VAC NON-CONDUCTIVE SUCTION TUBING 6MM X 1.8M (6FT.) L: Brand: CARDINAL HEALTH

## (undated) DEVICE — 35 ML SYRINGE REGULAR TIP: Brand: MONOJECT

## (undated) DEVICE — CUTTING LOOP, BIPOLAR, 0.30MM, 24/26 FR.: Brand: N.A.

## (undated) DEVICE — UNDERGLOVE INDICAT GRN  7.5

## (undated) NOTE — ED AVS SNAPSHOT
[facility] Emergency Department    [place] [address]     [place] [address]    Phone:  [phone]    Fax:  [phone]           Marychuy Chaidez   MRN: [id number]    Department:  [facility] Emergency Department   Date of Visit:  1/30/ and Class Registration line at [phone] or find a doctor online by visiting [email or web address].    IF THERE IS ANY CHANGE OR WORSENING OF YOUR CONDITION, CALL YOUR PRIMARY CARE PHYSICIAN AT ONCE OR RETURN IMMEDIATELY TO [address] [place] [address].     If

## (undated) NOTE — LETTER
Patient Name: Po Salvador  : 1950  MRN: [id number]  Patient Address: [address] [place], [place] [address] [facility]      Coronavirus Disease 2019 (COVID-19)     [facility] is committed to the safety and well-being of our patients, members carefully. If your symptoms get worse, call your healthcare provider immediately. 3. Get rest and stay hydrated.    4. If you have a medical appointment, call the healthcare provider ahead of time and tell them that you have or may have COVID-19.  5. For m of fever-reducing medications; and  · Improvement in respiratory symptoms (e.g., cough, shortness of breath); and  · At least 10 days have passed since symptoms first appeared OR if asymptomatic patient or date of symptom onset is unclear then use 10 days donors must:    · Have had a confirmed diagnosis of COVID-19  · Be symptom-free for at least 14 days*    *Some people will be required to have a repeat COVID-19 test in order to be eligible to donate.  If you’re instructed by [facility] that a repeat test is r random. Researchers are trying to identify similarities between people with a Post-COVID condition to better understand if there are risk factors. How do I prevent a Post-COVID condition?   The best way to prevent the long-term symptoms of COVID-19 is

## (undated) NOTE — Clinical Note
February 1, 2017         Deb Dan MD  [facility] [address]      UROLOGICAL CONSULTATION  Patient: Josie Tsang   YOB: 1950   Date of Visit: 1/31/2017       Dear Dr. Adelfo Price MD,    CASE SUMMARY:   The fever or flank pain. No history of bladder, prostate, testicular, epididymal infections. No history of trauma or injury to the urinary tract or genitalia. No history of tumors or cancers of the kidneys, ureters, bladder, testes or prostate.   The p 6.   No complaints of flank pain or kidney disease in the form of nephritis or glomerulonephritis. 7.   There is no history of orthopedic complaints of back, bone or joint pain. 8.   There is no history of skin disease.    9.   No history of neurological Pioglitazone HCl 30 MG Oral Tab Take 15 mg by mouth 2 (two) times daily. Disp:  Rfl:        ALLERGIES:     Tetanus Immune Glob*        Comment:Other reaction(s): Other (See Comments)             Allergy to horse hair/serum.     SOCIAL HISTORY:    Smoked al There are no hernias bilaterally. Rectal exam shows normal perineum, good rectal tone, no rectal masses. Prostate is   1.5+ in size, firm, symmetrical, non-nodular, nontender.   Seminal vesicles are palpable and normal.  Dull and sharp perineal sensati sure this is finalized no growth patient is on Keflex for the time being also given Flomax and Norco we can use on an as-needed basis I would stay away from nonsteroidal anti-inflammatories aspirin Motrin in case patient would need surgery.   However he is

## (undated) NOTE — MR AVS SNAPSHOT
[address] [address]  [phone]               Thank you for choosing us for your health care visit with Corrina Szymanski MD.  We are glad to serve you and happy to provide you with this summar This list is accurate as of: 3/10/17  9:52 AM.  Always use your most recent med list.                atenolol 25 MG Tabs   TAKE 1 TABLET BY MOUTH EVERY DAY   Commonly known as:  TENORMIN           Azelastine HCl 0.1 % Soln   2 sprays by Nasal route 2 (two) Summaries. If you've been to the Emergency Department or your doctor's office, you can view your past visit information in [employer] by going to Visits < Visit Summaries. [employer] questions? Call [phone] for help.   [employer] is NOT to be used for urge

## (undated) NOTE — MR AVS SNAPSHOT
[address]  [phone]               Thank you for choosing us for your health care visit with Sujata Barry MD.  We are glad to serve you and happy to provide you with this summar and middle ear help collect and amplify sound. The inner ear converts sound waves to messages that are sent to the brain.  The inner ear also senses the movement and position of your head and body so you can maintain your balance and see clearly, even when TAKE 1/2 TABLET BY MOUTH DAILY   Commonly known as:  TRICOR           Fluticasone Propionate 50 MCG/ACT Susp   1 spray by Nasal route 2 (two) times daily.    Commonly known as:  FLONASE           FREESTYLE LANCETS Misc   Use lancets to check blood sugar up office, you can view your past visit information in [email or web address] by going to Visits < Visit Summaries. [employer] questions? Call [phone] for help. [employer] is NOT to be used for urgent needs. For medical emergencies, dial 911.            Visit [facility]

## (undated) NOTE — LETTER
December 31, 2020    Allie Salter MD  [address]     Patient: Brendan Li   YOB: 1950   Date of Visit: 12/31/2020       Dear Dr. Allie Schwarz MD:    Thank you for referring Jose Maria Epps to me for Smoking status: Never Smoker      Smokeless tobacco: Never Used    Alcohol use: Yes      Comment: socially, once a month, if that    Drug use: Never      Medications:  Current Outpatient Medications   Medication Sig Dispense Refill   • latanoprost 0.00 Right Left    Pressure 14 13          Pupils       Pupils    Right PERRL    Left PERRL          Visual Fields       Left Right     Full Full          Extraocular Movement       Right Left     Full, Ortho Full, Ortho          Neuro/Psych     Oriented x3: Discussed early cataracts with patient. Told patient that cataracts are age appropriate and they are not surgical at this time. No treatment recommended at this time. Floaters in visual field, bilateral   There is no evidence of retinal pathology.   Al

## (undated) NOTE — MR AVS SNAPSHOT
[address] [place] [address]  [phone]               Thank you for choosing us for your health care visit with Melanie Chandra MD.  We are glad to serve you and happy to provide you with this summary of your visit. Allergies as of Mar 17, 2017     Tetanus Immune Globulin     Other reaction(s): Other (See Comments)  Allergy to horse hair/serum.                 Today's Vital Signs     BP Pulse Height Weight BMI    165/71 mmHg 71 5' 8\" (1.727 m) 192 lb 9.6 oz (87.363 kg What changed:  Another medication with the same name was removed. Continue taking this medication, and follow the directions you see here. Commonly known as:  ACTOS           simvastatin 40 MG Tabs   TAKE 1 TABLET (40 MG TOTAL) BY MOUTH NIGHTLY.    Common

## (undated) NOTE — MR AVS SNAPSHOT
[address] [address]  [phone]               Thank you for choosing us for your health care visit with Shun Crystal MD.  We are glad to serve you and happy to provide you with this summar ---------- Medicare covers annually or at 6-month intervals for prediabetic patients        Cardiovascular Disease Screening     Cholesterol, covered every 5 yrs including Total, LDL and Trigs No results found for: LDL, LDLC, CHOLEST, TRIG     EKG - covere Pneumococcal 13 (Prevnar)  Covered Once after 65 No orders found for this or any previous visit. Please get once after your 65th birthday    Pneumococcal 23 (Pneumovax)  Covered Once after 65 No orders found for this or any previous visit.  Please get once [facility], [address] [facility] [place] ([place])    [address]   [phone]            Mar 28, 2017  9:30 AM   Follow Up Visit with Kelley Figueroa MD   [address] [facility] Commonly known as:  FREESTYLE LITE TEST           lisinopril 10 MG Tabs   TAKE 1 TABLET (10 MG TOTAL) BY MOUTH DAILY. What changed:  See the new instructions.    Commonly known as:  PRINIVIL,ZESTRIL           loratadine 10 MG Tabs   Take 1 tablet (10 mg t Return in about 6 months (around 7/14/2017). MyChart     Visit uSamp  You can access your iHearthart to more actively manage your health care and view more details from this visit by going to [email or web address].   If you've recently had a

## (undated) NOTE — ED AVS SNAPSHOT
[facility] Emergency Department    [address]     [place] [address]    Phone:  [phone]    Fax:  [phone]           Natty Irvinnt   MRN: [id number]    Department:  [facility] Emergency Department   Date of Visit:  1/30/ Medication Information       Follow the directions for taking your medications provided by your doctor.  Please ask your health care provider, pharmacist or nurse if you have any questions regarding your home medications, including potential side e KIDNEY STONE W/ COLIC (ENGLISH)    KIDNEY STONES, TREATING: EXPECTANT THERAPY (ENGLISH)    KIDNEY STONES, UNDERSTANDING (ENGLISH)    URINE STRAINER (ENGLISH)      Disclosure     Insurance plans vary and the physician(s) referred by the ER may not be cover IF THERE IS ANY CHANGE OR WORSENING OF YOUR CONDITION, CALL YOUR PRIMARY CARE PHYSICIAN AT ONCE OR RETURN IMMEDIATELY TO THE EMERGENCY DEPARTMENT.     If you have been prescribed any medication(s), please fill your prescription right away and begin taking t - If you don’t have insurance, Raya Pacheco has partnered with [employer] [facility] [employer] to help you get signed up for insurance coverage.   [employer] [facility] [employer] is a [employer] program that can help with your Affordable Care Act cover

## (undated) NOTE — MR AVS SNAPSHOT
[address] [address]  [phone]               Thank you for choosing us for your health care visit with Nurse.   We are glad to serve you and happy to provide you with this summary of your vis med list.                atenolol 25 MG Tabs   TAKE 1 TABLET BY MOUTH EVERY DAY   Commonly known as:  TENORMIN           Azelastine HCl 0.1 % Soln   2 sprays by Nasal route 2 (two) times daily.    What changed:    - when to take this  - reasons to take this You can access your MyChart to more actively manage your health care and view more details from this visit by going to [email or web address].   If you've recently had a stay at the Hospital you can access your discharge instructions in [address] by thomas

## (undated) NOTE — Clinical Note
No referring provider defined for this encounter. 01/31/2017        Patient: Addy Leong   YOB: 1950   Date of Visit: 1/31/2017       Dear  Dr. Ida Gutierres      Thank you for referring Addy Leong to my practice.   Please find m urine or seminal fluid patient states he no longer sexually active but is younger person denies difficulty with intercourse pain or angulation with erections pain blood or pus with ejaculation    PAST UROLOGICAL HISTORY:      positive for 2 episodes of pre swelling. The patient exercises regularly. 4.   The patient denies pulmonary complaints of cough, asthma, emphysema, bronchitis, pneumonia or tuberculosis.    5.   The patient denies gi complaints of stomach, duodenal ulcer, gastritis, hiatal hernia, esop atenolol 25 MG Oral Tab TAKE 1 TABLET BY MOUTH EVERY DAY Disp:  Rfl:    Fenofibrate 145 MG Oral Tab TAKE 1/2 TABLET BY MOUTH DAILY Disp:  Rfl:    MetFORMIN HCl 500 MG Oral Tab TAKE TWO TABLETS BY MOUTH TWICE DAILY Disp:  Rfl:    Omega-3-acid Ethyl Esters 1 penis which is fully developed, circumcised. Glans and meatus are normal.  Penile shaft skin is normal.  There are no masses or plaques. Scrotum is fully developed. Testicles are descended bilaterally and normal in size, position and consistency.   Julio Morocho already passed the stone and therefore I would recommend patient sit for an IVP to prove this. I do discuss it could still be there or just simply not an obstructing pattern right now and not causing symptoms but he has no flank pain whatsoever.   I will c

## (undated) NOTE — MR AVS SNAPSHOT
[address]  [phone]               Thank you for choosing us for your health care visit with Mora Kehr.  MD Juan.  We are glad to serve you and happy to provide you with this summar [phone]              Allergies as of Jan 31, 2017     Tetanus Immune Globulin     Other reaction(s): Other (See Comments)  Allergy to horse hair/serum.                 Today's Vital Signs     BP Pulse Temp Height Weight BMI    136/70 mmHg 64 97.5 °F (3 Montelukast Sodium 10 MG Tabs   Take 1 tablet (10 mg total) by mouth nightly.    Commonly known as:  SINGULAIR           Omega-3-acid Ethyl Esters 1 g Caps   TAKE ONE CAPSULE BY MOUTH TWICE A DAY   Commonly known as:  LOVAZA           Omeprazole 40 MG Cpdr